# Patient Record
Sex: MALE | Race: WHITE | NOT HISPANIC OR LATINO | Employment: FULL TIME | URBAN - METROPOLITAN AREA
[De-identification: names, ages, dates, MRNs, and addresses within clinical notes are randomized per-mention and may not be internally consistent; named-entity substitution may affect disease eponyms.]

---

## 2017-02-03 ENCOUNTER — LAB VISIT (OUTPATIENT)
Dept: LAB | Facility: HOSPITAL | Age: 27
End: 2017-02-03
Attending: INTERNAL MEDICINE
Payer: COMMERCIAL

## 2017-02-03 ENCOUNTER — OFFICE VISIT (OUTPATIENT)
Dept: INTERNAL MEDICINE | Facility: CLINIC | Age: 27
End: 2017-02-03
Payer: COMMERCIAL

## 2017-02-03 VITALS — DIASTOLIC BLOOD PRESSURE: 71 MMHG | HEART RATE: 69 BPM | OXYGEN SATURATION: 99 % | SYSTOLIC BLOOD PRESSURE: 123 MMHG

## 2017-02-03 DIAGNOSIS — K92.1 HEMATOCHEZIA: Primary | ICD-10-CM

## 2017-02-03 DIAGNOSIS — R10.84 GENERALIZED ABDOMINAL PAIN: ICD-10-CM

## 2017-02-03 DIAGNOSIS — K92.1 HEMATOCHEZIA: ICD-10-CM

## 2017-02-03 LAB
ALBUMIN SERPL BCP-MCNC: 4.2 G/DL
ALP SERPL-CCNC: 71 U/L
ALT SERPL W/O P-5'-P-CCNC: 12 U/L
ANION GAP SERPL CALC-SCNC: 8 MMOL/L
AST SERPL-CCNC: 16 U/L
BASOPHILS # BLD AUTO: 0.02 K/UL
BASOPHILS NFR BLD: 0.3 %
BILIRUB SERPL-MCNC: 0.6 MG/DL
BUN SERPL-MCNC: 15 MG/DL
CALCIUM SERPL-MCNC: 9.2 MG/DL
CHLORIDE SERPL-SCNC: 103 MMOL/L
CO2 SERPL-SCNC: 30 MMOL/L
CREAT SERPL-MCNC: 0.9 MG/DL
DIFFERENTIAL METHOD: ABNORMAL
EOSINOPHIL # BLD AUTO: 0.1 K/UL
EOSINOPHIL NFR BLD: 1.4 %
ERYTHROCYTE [DISTWIDTH] IN BLOOD BY AUTOMATED COUNT: 12.1 %
EST. GFR  (AFRICAN AMERICAN): >60 ML/MIN/1.73 M^2
EST. GFR  (NON AFRICAN AMERICAN): >60 ML/MIN/1.73 M^2
GLUCOSE SERPL-MCNC: 91 MG/DL
HCT VFR BLD AUTO: 40.9 %
HGB BLD-MCNC: 13.9 G/DL
LYMPHOCYTES # BLD AUTO: 2.1 K/UL
LYMPHOCYTES NFR BLD: 36.5 %
MCH RBC QN AUTO: 30.4 PG
MCHC RBC AUTO-ENTMCNC: 34 %
MCV RBC AUTO: 90 FL
MONOCYTES # BLD AUTO: 0.4 K/UL
MONOCYTES NFR BLD: 6.4 %
NEUTROPHILS # BLD AUTO: 3.2 K/UL
NEUTROPHILS NFR BLD: 55.2 %
PLATELET # BLD AUTO: 222 K/UL
PMV BLD AUTO: 10 FL
POTASSIUM SERPL-SCNC: 4.1 MMOL/L
PROT SERPL-MCNC: 6.7 G/DL
RBC # BLD AUTO: 4.57 M/UL
SODIUM SERPL-SCNC: 141 MMOL/L
WBC # BLD AUTO: 5.75 K/UL

## 2017-02-03 PROCEDURE — 99214 OFFICE O/P EST MOD 30 MIN: CPT | Mod: S$GLB,,, | Performed by: INTERNAL MEDICINE

## 2017-02-03 PROCEDURE — 80053 COMPREHEN METABOLIC PANEL: CPT

## 2017-02-03 PROCEDURE — 36415 COLL VENOUS BLD VENIPUNCTURE: CPT

## 2017-02-03 PROCEDURE — 99999 PR PBB SHADOW E&M-EST. PATIENT-LVL III: CPT | Mod: PBBFAC,,, | Performed by: INTERNAL MEDICINE

## 2017-02-03 PROCEDURE — 85025 COMPLETE CBC W/AUTO DIFF WBC: CPT

## 2017-02-03 RX ORDER — IBUPROFEN 200 MG
200 TABLET ORAL EVERY 6 HOURS PRN
COMMUNITY
End: 2017-02-07

## 2017-02-03 NOTE — MR AVS SNAPSHOT
[facility]  [address] [place] [address] [address]  [place]y [place] [address]  Phone: [phone]  Fax: [phone]                  Adi Coleman III   2/3/2017 11:40 AM   Office Visit    Description:  Male : 1990   Provider:  Mary Hooper MD   Department:  [facility]           Reason for Visit     Abdominal Pain     Rectal Bleeding           Diagnoses this Visit        Comments    Hematochezia    -  Primary     Generalized abdominal pain                To Do List           Future Appointments        Provider Department Dept Phone    2/3/2017 12:30 PM APPOINTMENT LAB, [facility] [phone]    2017 9:40 AM Leona Crandall [facility] [phone]      Goals (5 Years of Data)     None      Follow-Up and Disposition     Return in about 4 weeks (around 3/3/2017).      [facility]     [facility] Nurse Care Line -  Assistance  Registered nurses in the [facility] provide clinical advisement, health education, appointment booking, and other advisory services.  Call for this free service at [phone].             Medications           Message regarding Medications     Verify the changes and/or additions to your medication regime listed below are the same as discussed with your clinician today.  If any of these changes or additions are incorrect, please notify your healthcare provider.             Verify that the below list of medications is an accurate representation of the medications you are currently taking.  If none reported, the list may be blank. If incorrect, please contact your healthcare provider. Carry this list with you in case of emergency.           Current Medications     ibuprofen (ADVIL,MOTRIN) 200 MG tablet Take 200 mg by mouth every 6 (six) hours as needed for Pain.    alprazolam (XANAX) 0.5 MG tablet Take 1 tablet (0.5 mg total) by mouth 3 (three) times daily as needed for Anxiety.            Clinical Reference Information           Your Vitals Were     BP Pulse SpO2             123/71 69 99%         Blood Pressure          Most Recent Value    BP  123/71      Allergies as of 2/3/2017     No Known Allergies      Immunizations Administered on Date of Encounter - 2/3/2017     None      Orders Placed During Today's Visit      Normal Orders This Visit    Ambulatory consult to Gastroenterology     Case request GI: COLONOSCOPY     Future Labs/Procedures Expected by Expires    CBC auto differential  2/3/2017 4/4/2018    Comprehensive metabolic panel  2/3/2017 4/4/2018      [facility] Sign-Up     Activating your [facility] account is as easy as 1-2-3!     1) Visit [email or web address], select Sign Up Now, enter this activation code and your date of birth, then select Next.  [email or web address]  Expires: 3/20/2017 12:24 PM      2) Create a username and password to use when you visit [facility] in the future and select a security question in case you lose your password and select Next.    3) Enter your e-mail address and click Sign Up!    Additional Information  If you have questions, please e-mail [email or web address] or call [phone] to talk to our [facility] staff. Remember, [facility] is NOT to be used for urgent needs. For medical emergencies, dial 911.         Language Assistance Services     ATTENTION: Language assistance services are available, free of charge. Please call [phone].      ATENCIÓN: Si habla español, tiene a morillo disposición servicios gratuitos de asistencia lingüística. Llame al [phone].     CHÚ Ý: N?u b?n nói Ti?ng Vi?t, có các d?ch v? h? tr? ngôn ng? mi?n phí dành cho b?n. G?i s? [phone].         [facility] complies with applicable Federal civil rights laws and does not discriminate on the basis of race, color, national origin, age, disability, or sex.

## 2017-02-03 NOTE — PROGRESS NOTES
Subjective:    Portions of this note are generated with voice recognition software. Typographical errors may exist.   Patient ID: Adi Coleman III is a 26 y.o. male.    Chief Complaint: Abdominal Pain (lower abdomen) and Rectal Bleeding (x7 days)    HPI: Bright blood per rectum x 1 week. About a teaspoon ful, mostly on toilet paper, but also noticed in bowl.  Assoc. wih dissuse abdominal pain , crampy, prior to having the BM.  No hematemesis. No oral ulcers/ joint pain/rash  No sign fam history    Review of patient's allergies indicates:  No Known Allergies  Past Medical History   Diagnosis Date    Asthma     Depression      Past Surgical History   Procedure Laterality Date    Tonsillectomy       No family history on file.  Social History     Social History    Marital status: Single     Spouse name: N/A    Number of children: N/A    Years of education: N/A     Occupational History    Not on file.     Social History Main Topics    Smoking status: Former Smoker    Smokeless tobacco: Not on file    Alcohol use No    Drug use: No    Sexual activity: Not on file     Other Topics Concern    Not on file     Social History Narrative     ROS:  GENERAL:   SKIN:   HEAD: No headaches.  EYES: No Blurriing of vision  EARS: No ear pain or changes in hearing.  NOSE: No congestion or rhinorrhea.  MOUTH & THROAT: No hoarseness, change in voice, or sore throat.  NODES: Denies swollen glands.  CHEST: Does chronically get some slight shortness of breath with going up her stairs. No chest pain. No acute worsening recently.  CARDIOVASCULAR: Denies chest pain, PND, orthopnea.  ABDOMEN: No nausea, vomiting, or changes in bowel function.  URINARY: No flank pain, dysuria or hematuria.  PERIPHERAL VASCULAR: No claudication or cyanosis.  MUSCULOSKELETAL: No joint stiffness or swelling. Denies back pain.  NEUROLOGIC: No weakness or numbness.    Physical Exam       Vital signs reviewed, stable BP  PE:   APPEARANCE: Well nourished,  well developed, in no acute distress.   HEAD: Normocephalic, atraumatic.  EYES: PERRL. EOMI. Conjunctivae noninjected.  EARS: TM's intact. Light reflex normal. No retraction or perforation  NOSE: Mucosa pink. Airway clear.  MOUTH & THROAT: No tonsillar enlargement. No pharyngeal erythema or exudate.   NECK: Supple with no cervical lymphadenopathy. No carotid bruits. No thyromegaly  CHEST: Good inspiratory effort. Lungs clear to auscultation with no wheezes or crackles.  CARDIOVASCULAR: Normal S1, S2. No rubs, murmurs, or gallops.  ABDOMEN: Bowel sounds normal. Not distended. Soft. No tenderness or masses. No organomegaly.  EXTREMITIES: No edema, cyanosis, or clubbing.     Assessment:     Labs:  No results found for this or any previous visit (from the past 1008 hour(s)).  1. Hematochezia    2. Generalized abdominal pain        Plan:   Refer to GI evaluation  CBc  Informed him S/S when to go to ER   Orders Placed This Encounter   Procedures    CBC auto differential    Comprehensive metabolic panel    Ambulatory consult to Gastroenterology

## 2017-02-05 ENCOUNTER — HOSPITAL ENCOUNTER (EMERGENCY)
Facility: HOSPITAL | Age: 27
Discharge: HOME | End: 2017-02-05
Attending: EMERGENCY MEDICINE
Payer: COMMERCIAL

## 2017-02-05 VITALS
OXYGEN SATURATION: 98 % | BODY MASS INDEX: 21 KG/M2 | RESPIRATION RATE: 16 BRPM | DIASTOLIC BLOOD PRESSURE: 58 MMHG | HEIGHT: 71 IN | WEIGHT: 150 LBS | TEMPERATURE: 98 F | SYSTOLIC BLOOD PRESSURE: 122 MMHG | HEART RATE: 70 BPM

## 2017-02-05 DIAGNOSIS — K62.5 RECTAL BLEEDING: Primary | ICD-10-CM

## 2017-02-05 LAB — OB PNL STL: NEGATIVE

## 2017-02-05 PROCEDURE — 99283 EMERGENCY DEPT VISIT LOW MDM: CPT

## 2017-02-05 PROCEDURE — 82272 OCCULT BLD FECES 1-3 TESTS: CPT

## 2017-02-05 RX ORDER — HYDROCORTISONE 25 MG/G
CREAM TOPICAL 2 TIMES DAILY
Qty: 20 G | Refills: 0 | Status: SHIPPED | OUTPATIENT
Start: 2017-02-05 | End: 2017-02-05 | Stop reason: ALTCHOICE

## 2017-02-05 NOTE — DISCHARGE INSTRUCTIONS
Understanding Rectal Bleeding    Rectal bleeding is when blood passes through your rectum and anus. It can happen with or without a bowel movement. Rectal bleeding may be a sign of a serious problem in your rectum, colon, or upper GI tract. Call your healthcare provider right away if you have any rectal bleeding.  The GI Tract  The gastrointestinal (GI) tract includes the mouth, esophagus, stomach, small intestine, large intestine (colon), rectum, and anus. The food you eat is digested as it passes through the GI tract. Solid waste leaves the body through the rectum.   Rectal bleeding and GI problems  The cause of rectal bleeding may be found in any region of the GI tract. The colon or rectum may be the site of your bleeding problem. Or, bleeding may be due to problems farther up the GI tract, such as in the small intestine, duodenum, or stomach.  Causes of rectal bleeding  Rectal bleeding causes include the following:  · Hemorrhoids (swollen veins in the rectum and anus)  · Fissures (tears in or near the anus)  · Diverticulosis (inflamed pockets in the colon wall)  · Infection  · Ischemia (low blood flow)  · Radiation damage  · Inflammatory bowel disease (Crohn's disease or ulcerative colitis)  · Ulcers in the upper GI tract and inflammation of the large intestine  · Abnormal tissue growths (tumors or polyps) in the GI tract  · A bulging rectum (also called a rectal prolapse)  · Abnormal blood vessels in the small intestine or in the colon  Common symptoms  Common symptoms include the following:  · Rectal pain, itching, or soreness  · Belly pain or epigastric pain  · Minor occasional drops of blood that appear on the stool or toilet paper, to greater amounts of stool that appear black or tarry   Rectal bleeding can also happen without pain.  Date Last Reviewed: 7/1/2016  © 9624-2579 [employer]. [address] [place] [address], [place] [address]. All rights reserved. This information is not intended as a  substitute for professional medical care. Always follow your healthcare professional's instructions.

## 2017-02-05 NOTE — ED NOTES
Pt reports that he has noticed bright red blood on toilet tissue when he wipes after BM's. Also, c/o pain to rectal area. Was seen in clinic and scheduled for a procedure to be done on Tuesday, but states he was concerned that bleeding has increased. Denies hx hemorrhoids. Recent constipation issues.

## 2017-02-05 NOTE — ED PROVIDER NOTES
Encounter Date: 2/5/2017       History     Chief Complaint   Patient presents with    Rectal Pain     pain with bright red blood noted on toilet tissue x 1 week.  Amount of blood and pain has increased.       Review of patient's allergies indicates:  No Known Allergies  HPI Comments:  Adi Coleman III 26 y.o. nontoxic/afebrile male with PMH of back pain presented to the ED with C/O rectal bleeding and pain for the past one week. She reports that he has noted BRB with wiping and a scant amount of stool on few episodes. He was seen by urgent care and PCP for these symptoms with CBC and CMP drawn this week with no significant findings. He was placed on suppository for the pain and symptoms with scant use reported by patient. He states that he was told of possible internal hemorrhoid as none seen externally and has an ambulatory referral to see GI. He reports some intermittent abdominal pain that he presently denies. He states that his stools have been firm as he was taking narcotic pain medication for back pain. He denies any fever, chills, N/V/D, hematuria, or dysuria.     The history is provided by the patient.     Past Medical History   Diagnosis Date    Asthma     Depression      No past medical history pertinent negatives.  Past Surgical History   Procedure Laterality Date    Tonsillectomy       History reviewed. No pertinent family history.  Social History   Substance Use Topics    Smoking status: Former Smoker    Smokeless tobacco: None    Alcohol use No     Review of Systems   Constitutional: Negative for activity change, appetite change, chills and fever.   HENT: Negative for congestion and sore throat.    Respiratory: Negative for cough and shortness of breath.    Cardiovascular: Negative for chest pain.   Gastrointestinal: Positive for abdominal pain, blood in stool and rectal pain. Negative for constipation, diarrhea, nausea and vomiting.        Intermittent LLQ abdominal pain; presently denies    Genitourinary: Negative for dysuria, hematuria and testicular pain.   Musculoskeletal: Negative for back pain.   Skin: Negative for pallor and rash.   Neurological: Negative for dizziness, weakness, light-headedness and numbness.   Hematological: Does not bruise/bleed easily.       Physical Exam   Initial Vitals   BP Pulse Resp Temp SpO2   02/05/17 1428 02/05/17 1428 02/05/17 1428 02/05/17 1428 02/05/17 1428   122/58 70 16 98.3 °F (36.8 °C) 98 %     Physical Exam    Nursing note and vitals reviewed.  Constitutional: Vital signs are normal. He appears well-developed and well-nourished. He is cooperative.  Non-toxic appearance. He does not appear ill.   HENT:   Head: Normocephalic and atraumatic.   Eyes: Conjunctivae and lids are normal.   Neck: Trachea normal and normal range of motion. Neck supple. No stridor present. No tracheal deviation present.   Cardiovascular: Normal rate.   Pulmonary/Chest: No respiratory distress.   Abdominal: Soft. Normal appearance. There is no tenderness. There is no rebound and no guarding.   Genitourinary: Rectum normal. Rectal exam shows no external hemorrhoid, no internal hemorrhoid, no fissure, no tenderness and guaiac negative stool. Guaiac negative stool.   Musculoskeletal: Normal range of motion.   Neurological: He is alert and oriented to person, place, and time. He has normal strength. GCS eye subscore is 4. GCS verbal subscore is 5. GCS motor subscore is 6.   Skin: Skin is warm, dry and intact. No rash noted.   Psychiatric: He has a normal mood and affect. His speech is normal and behavior is normal. Thought content normal.         ED Course   Procedures  Labs Reviewed   OCCULT BLOOD X 1, STOOL       Adi HELMS Coleman III 26 y.o. nontoxic/afebrile male with PMH of back pain presented to the ED with C/O rectal bleeding and pain for the past one week. She reports that he has noted BRB with wiping and a scant amount of stool on few episodes. He was seen by urgent care and PCP for  these symptoms with CBC and CMP drawn this week with no significant findings. He was placed on suppository for the pain and symptoms with scant use reported by patient. He states that he was told of possible internal hemorrhoid as none seen externally and has an ambulatory referral to see GI. He reports some intermittent abdominal pain that he presently denies. He states that his stools have been firm as he was taking narcotic pain medication for back pain. He denies any fever, chills, N/V/D, hematuria, or dysuria. ROS positive for rectal bleeding.  Physical exam reveals patient well appearing in no obvious distress. Mucous membranes most and free of pallor. Lungs clear, heart regular rate and rhythm.  Abdomen is soft and nontender with normal bowel sounds. Rectal with no bleeding, hemorrhoid, or fissure. No internal hemorrhoid noted on exam, good tone and no blood noted on occult. FROM of all extremities with strength 5/5 bilaterally. skin free of pallor.     DDX: rectal bleeding, hemorrhoid internal vs, external, fissure, diverticulitis    ED management: occult negative. Reviewed stable CBC on 2/2/17. Patient appears stale today with low suspicion of infectious process at this time. Instructed to increase water intake, refrain from opioids and use proctofoam as symptoms likely due to internal hemorrhoids as occult blood is negative. Encouraged to keep GI follow up Tuesday.     Impression/Plan:The encounter diagnosis was Rectal bleeding.  Patient will follow up with Primary.  Patient cautioned on when to return to ED.  Pt. Understands and agrees with current treatment plan                             ED Course     Clinical Impression:   The encounter diagnosis was Rectal bleeding.          OTONIEL Blackmon  02/05/17 [id number]

## 2017-02-05 NOTE — ED AVS SNAPSHOT
[facility] [address]  [place] [address]               Adi Coleman III   2017  2:55 PM   ED    Description:  Male : 1990   Department:  [facility]           Your Care was Coordinated By:     Provider Role From To    Ashok Chavez Jr., MD Attending Provider [phone] --    OTONIEL Blackmon Physician Assistant 17 [phone] --      Reason for Visit     Rectal Pain           Diagnoses this Visit        Comments    Rectal bleeding    -  Primary       ED Disposition     None           To Do List           Follow-up Information     Follow up with GI .    Why:  as scheduled       These Medications        Disp Refills Start End    hydrocortisone 2.5 % cream 20 g 0 2017 2/15/2017    Apply topically 2 (two) times daily. - Topical (Top)    Pharmacy: [facility] [address] [place] [address] SOFI PENA [address] [facility] #: [phone]         [facility]     [facility] Care [facility] -  Assistance  Registered nurses in the [facility] provide clinical advisement, health education, appointment booking, and other advisory services.  Call for this free service at [phone].             Medications           Message regarding Medications     Verify the changes and/or additions to your medication regime listed below are the same as discussed with your clinician today.  If any of these changes or additions are incorrect, please notify your healthcare provider.        START taking these NEW medications        Refills    hydrocortisone 2.5 % cream 0    Sig: Apply topically 2 (two) times daily.    Class: Print    Route: Topical (Top)           Verify that the below list of medications is an accurate representation of the medications you are currently taking.  If none reported, the list may be blank. If incorrect, please contact your healthcare provider. Carry this list with you in  "case of emergency.           Current Medications     alprazolam (XANAX) 0.5 MG tablet Take 1 tablet (0.5 mg total) by mouth 3 (three) times daily as needed for Anxiety.    hydrocortisone 2.5 % cream Apply topically 2 (two) times daily.    ibuprofen (ADVIL,MOTRIN) 200 MG tablet Take 200 mg by mouth every 6 (six) hours as needed for Pain.           Clinical Reference Information           Your Vitals Were     BP Pulse Temp Resp Height Weight    122/58 (BP Location: Left arm, Patient Position: Sitting) 70 98.3 °F (36.8 °C) (Oral) 16 5' 11" (1.803 m) 68 kg (150 lb)    SpO2 BMI             98% 20.92 kg/m2         Allergies as of 2/5/2017     No Known Allergies      Immunizations Administered on Date of Encounter - 2/5/2017     None      ED Micro, Lab, POCT     Start Ordered       Status Ordering Provider    02/05/17 1528 02/05/17 1527  Occult blood x 1, stool  Once      Final result       ED Imaging Orders     None        Discharge Instructions         Understanding Rectal Bleeding    Rectal bleeding is when blood passes through your rectum and anus. It can happen with or without a bowel movement. Rectal bleeding may be a sign of a serious problem in your rectum, colon, or upper GI tract. Call your healthcare provider right away if you have any rectal bleeding.  The GI Tract  The gastrointestinal (GI) tract includes the mouth, esophagus, stomach, small intestine, large intestine (colon), rectum, and anus. The food you eat is digested as it passes through the GI tract. Solid waste leaves the body through the rectum.   Rectal bleeding and GI problems  The cause of rectal bleeding may be found in any region of the GI tract. The colon or rectum may be the site of your bleeding problem. Or, bleeding may be due to problems farther up the GI tract, such as in the small intestine, duodenum, or stomach.  Causes of rectal bleeding  Rectal bleeding causes include the following:  · Hemorrhoids (swollen veins in the rectum and " anus)  · Fissures (tears in or near the anus)  · Diverticulosis (inflamed pockets in the colon wall)  · Infection  · Ischemia (low blood flow)  · Radiation damage  · Inflammatory bowel disease (Crohn's disease or ulcerative colitis)  · Ulcers in the upper GI tract and inflammation of the large intestine  · Abnormal tissue growths (tumors or polyps) in the GI tract  · A bulging rectum (also called a rectal prolapse)  · Abnormal blood vessels in the small intestine or in the colon  Common symptoms  Common symptoms include the following:  · Rectal pain, itching, or soreness  · Belly pain or epigastric pain  · Minor occasional drops of blood that appear on the stool or toilet paper, to greater amounts of stool that appear black or tarry   Rectal bleeding can also happen without pain.  Date Last Reviewed: 7/1/2016  © 2847-9386 [employer]. [address] [place] [address]. All rights reserved. This information is not intended as a substitute for professional medical care. Always follow your healthcare professional's instructions.          Your Scheduled Appointments     Feb 07, 2017  9:40 AM CST   Consult with CEASAR Fernandez - Gastroenterology ([place])    [address] [facility]  [address]  [facility] [id number] [phone]              [facility] Sign-Up     Activating your [facility] account is as easy as 1-2-3!     1) Visit [email or web address], select Sign Up Now, enter this activation code and your date of birth, then select Next.  [email or web address]  Expires: 3/20/2017 12:24 PM      2) Create a username and password to use when you visit [facility] in the future and select a security question in case you lose your password and select Next.    3) Enter your e-mail address and click Sign Up!    Additional Information  If you have questions, please e-mail [email or web address] or call [phone] to talk to our [facility] staff. Remember, [facility] is NOT to be used for urgent  needs. For medical emergencies, dial 911.         Smoking Cessation     If you would like to quit smoking:   You may be eligible for free services if you are a [place] resident and started smoking cigarettes before September 1, 1988.  Call the Smoking Cessation Trust (SCT) toll free at (851) 211-1638 or (350) 127-5095.   Call 1-800-QUIT-NOW if you do not meet the above criteria.             [facility] complies with applicable Federal civil rights laws and does not discriminate on the basis of race, color, national origin, age, disability, or sex.        Language Assistance Services     ATTENTION: Language assistance services are available, free of charge. Please call [phone].      ATENCIÓN: Si habla betty, tiene a morillo disposición servicios gratuitos de asistencia lingüística. Llame al [phone].     CHÚ Ý: N?u b?n nói Ti?ng Vi?t, có các d?ch v? h? tr? ngôn ng? mi?n phí dành cho b?n. G?i s? [phone].

## 2017-02-06 PROBLEM — K92.1 HEMATOCHEZIA: Status: ACTIVE | Noted: 2017-02-06

## 2017-02-07 ENCOUNTER — INITIAL CONSULT (OUTPATIENT)
Dept: GASTROENTEROLOGY | Facility: CLINIC | Age: 27
End: 2017-02-07
Payer: COMMERCIAL

## 2017-02-07 VITALS
DIASTOLIC BLOOD PRESSURE: 68 MMHG | BODY MASS INDEX: 20.96 KG/M2 | HEIGHT: 71 IN | SYSTOLIC BLOOD PRESSURE: 116 MMHG | HEART RATE: 81 BPM | WEIGHT: 149.69 LBS

## 2017-02-07 DIAGNOSIS — K62.5 RECTAL BLEEDING: ICD-10-CM

## 2017-02-07 DIAGNOSIS — K59.00 CONSTIPATION, UNSPECIFIED CONSTIPATION TYPE: Primary | ICD-10-CM

## 2017-02-07 PROCEDURE — 99999 PR PBB SHADOW E&M-EST. PATIENT-LVL III: CPT | Mod: PBBFAC,,, | Performed by: NURSE PRACTITIONER

## 2017-02-07 PROCEDURE — 99203 OFFICE O/P NEW LOW 30 MIN: CPT | Mod: S$GLB,,, | Performed by: NURSE PRACTITIONER

## 2017-02-07 NOTE — LETTER
February 7, 2017      Mary Hooper MD  [address]  [address]  [place] [address]           [facility]  [address]  [address]  [place] [address]  Phone: [phone]          Patient: Adi Coleman III   MR Number: [id number]   YOB: 1990   Date of Visit: 2/7/2017       Dear Dr. Mary Hooper:    Thank you for referring Adi Coleman to me for evaluation. Attached you will find relevant portions of my assessment and plan of care.    If you have questions, please do not hesitate to call me. I look forward to following Adi Coleman along with you.    Sincerely,    Leona Crandall, ANTONI    Enclosure  CC:  No Recipients    If you would like to receive this communication electronically, please contact [email or web address] or [phone] to request more information on [facility] access.    For providers and/or their staff who would like to refer a patient to [facility], please contact us through our one-stop-shop provider referral line, [facility], at [phone].    If you feel you have received this communication in error or would no longer like to receive these types of communications, please e-mail [email or web address]

## 2017-02-07 NOTE — PROGRESS NOTES
Subjective:       Patient ID: Adi Coleman III is a 26 y.o. male.    Chief Complaint: Rectal Bleeding and Abdominal Pain    HPI  For one week has noted blood with bowel movements mostly on the toilet paper and scant amounts on the stool.  He shows me a picture today of bright red blood in small amounts on toilet paper.  He does report hard stools and straining.  He denies anal rectal pain.  Last week he felt the bleeding was worse and presented to the ED.  Hemoccult negative, negative rectal exam and CBC stable.  He was prescribed proctofoam but has not yet used this.  He denies abdominal pain associated with his current complaints but reports episodic lower abdominal pain.  Denies nausea, vomiting or black stools.  Denies family history of colon cancer, colon polyps, IBD.    Review of Systems   Constitutional: Negative.  Negative for activity change, appetite change, fatigue, fever and unexpected weight change.   Respiratory: Negative.  Negative for cough, choking and shortness of breath.    Cardiovascular: Negative.  Negative for chest pain.   Gastrointestinal: Positive for anal bleeding and constipation. Negative for abdominal distention, abdominal pain, diarrhea, nausea, rectal pain and vomiting.   Genitourinary: Negative.  Negative for difficulty urinating, dysuria and hematuria.   Musculoskeletal: Negative.  Negative for neck pain and neck stiffness.   Skin: Negative.    Neurological: Negative.  Negative for dizziness, syncope, weakness and light-headedness.   Psychiatric/Behavioral: Negative.        Objective:      Physical Exam   Constitutional: He is oriented to person, place, and time. He appears well-developed and well-nourished. No distress.   HENT:   Head: Normocephalic.   Eyes: No scleral icterus.   Cardiovascular: Normal rate.    Pulmonary/Chest: Effort normal. No respiratory distress.   Abdominal: Normal appearance.   Neurological: He is alert and oriented to person, place, and time.   Skin: Skin is  warm and dry. He is not diaphoretic. No pallor.   Psychiatric: He has a normal mood and affect. His behavior is normal. Judgment and thought content normal.   Vitals reviewed.      Assessment:       1. Constipation, unspecified constipation type    2. Rectal bleeding        Plan:         Adi was seen today for rectal bleeding and abdominal pain.    Diagnoses and all orders for this visit:    Constipation, unspecified constipation type    Rectal bleeding       -   High fiber diet  -   Fiber supplement  -   [place] fluid intake   -   Exercise  -  Stool softener    Proctofoam as prescribed.  Sitz baths.     If bleeding continues after treatment will schedule colonoscopy.    He will follow up in one month or sooner if needed.

## 2017-02-15 ENCOUNTER — OFFICE VISIT (OUTPATIENT)
Dept: GASTROENTEROLOGY | Facility: CLINIC | Age: 27
End: 2017-02-15
Payer: COMMERCIAL

## 2017-02-15 VITALS
HEIGHT: 71 IN | HEART RATE: 78 BPM | DIASTOLIC BLOOD PRESSURE: 73 MMHG | WEIGHT: 152.13 LBS | BODY MASS INDEX: 21.3 KG/M2 | SYSTOLIC BLOOD PRESSURE: 123 MMHG

## 2017-02-15 DIAGNOSIS — K92.1 HEMATOCHEZIA: Primary | ICD-10-CM

## 2017-02-15 PROCEDURE — 99213 OFFICE O/P EST LOW 20 MIN: CPT | Mod: S$GLB,,, | Performed by: NURSE PRACTITIONER

## 2017-02-15 PROCEDURE — 99999 PR PBB SHADOW E&M-EST. PATIENT-LVL III: CPT | Mod: PBBFAC,,, | Performed by: NURSE PRACTITIONER

## 2017-02-15 NOTE — PROGRESS NOTES
Subjective:       Patient ID: Adi Coleman III is a 26 y.o. male.    Chief Complaint: Rectal Bleeding and Follow-up    HPI  Reports continued complaints of rectal bleeding occurring with every bowel movement.  Reports this has been bright red and has noted 2 days this of week of dark red blood and mucous.   He reports hard stools with straining at times.  Other times, he may have 6-10 soft bowel movements daily.  He sees blood regardless of bowel movement.  He has used hemorrhoidal suppositories with no improvement.  He has added fiber supplement as we discussed at his last visit with no changes. He did not use the proctofoam.  Denies abdominal pain, nausea, vomiting or black stools.     Review of Systems   Constitutional: Negative.  Negative for activity change, appetite change, fatigue, fever and unexpected weight change.   HENT: Negative.  Negative for sore throat and trouble swallowing.    Respiratory: Negative.  Negative for cough, choking and shortness of breath.    Cardiovascular: Negative.  Negative for chest pain.   Gastrointestinal: Positive for blood in stool and constipation. Negative for abdominal distention, abdominal pain, diarrhea, nausea, rectal pain and vomiting.   Genitourinary: Negative.  Negative for difficulty urinating, dysuria and hematuria.   Musculoskeletal: Negative.  Negative for neck pain and neck stiffness.   Skin: Negative.    Neurological: Negative.  Negative for dizziness, syncope, weakness and light-headedness.   Psychiatric/Behavioral: Negative.        Objective:      Physical Exam   Constitutional: He is oriented to person, place, and time. He appears well-developed and well-nourished. No distress.   HENT:   Head: Normocephalic.   Eyes: No scleral icterus.   Cardiovascular: Normal rate.    Pulmonary/Chest: Effort normal. No respiratory distress.   Musculoskeletal: Normal range of motion.   Neurological: He is alert and oriented to person, place, and time.   Skin: Skin is warm and  dry. He is not diaphoretic. No pallor.   Psychiatric: He has a normal mood and affect. His behavior is normal. Judgment and thought content normal.   Vitals reviewed.      Assessment:       1. Hematochezia        Plan:         Adi was seen today for rectal bleeding and follow-up.    Diagnoses and all orders for this visit:    Hematochezia    Other orders  -     Case request GI: COLONOSCOPY       I have explained the planned procedures to the patient.The risks, benefits and alternatives of the procedure were also explained in detail. Patient verbalized understanding, all questions were answered. The patient agrees to proceed as planned    Continue constipation treatment as discussed at last visit.    Begin proctofoam.    Colonoscopy for persistent rectal bleeding.

## 2017-03-07 NOTE — H&P
For greater than one week patient noted blood with bowel movements mostly on the toilet paper and scant amounts on the stool.    He does report hard stools and straining.  He denies anal rectal pain.   presented to the ED. Hemoccult negative, negative rectal exam and CBC stable.    He denies abdominal pain associated with his current complaints but reports episodic lower abdominal pain.  Denies nausea, vomiting or black stools.  Denies family history of colon cancer, colon polyps, IBD.     Review of Systems   Constitutional: Negative. Negative for activity change, appetite change, fatigue, fever and unexpected weight change.   Respiratory: Negative. Negative for cough, choking and shortness of breath.   Cardiovascular: Negative. Negative for chest pain.   Gastrointestinal: Positive for anal bleeding and constipation. Negative for abdominal distention, abdominal pain, diarrhea, nausea, rectal pain and vomiting.   Genitourinary: Negative. Negative for difficulty urinating, dysuria and hematuria.   Musculoskeletal: Negative. Negative for neck pain and neck stiffness.   Skin: Negative.   Neurological: Negative. Negative for dizziness, syncope, weakness and light-headedness.   Psychiatric/Behavioral: Negative.       Objective:      Physical Exam   Constitutional: He is oriented to person, place, and time. He appears well-developed and well-nourished. No distress.   HENT:   Head: Normocephalic.   Eyes: No scleral icterus.   Cardiovascular: Normal rate.   Pulmonary/Chest: Effort normal. No respiratory distress.   Abdominal: Normal appearance.   Neurological: He is alert and oriented to person, place, and time.   Skin: Skin is warm and dry. He is not diaphoretic. No pallor.   Psychiatric: He has a normal mood and affect. His behavior is normal. Judgment and thought content normal.   Vitals reviewed.      Assessment:       1. Constipation, unspecified constipation type    2. Rectal bleeding        Plan:         Adi fabi  seen for rectal bleeding and abdominal pain.     Diagnoses and all orders for this visit:     Constipation, unspecified constipation type     Rectal bleeding        - High fiber diet  - Fiber supplement  - [place] fluid intake   - Exercise  - Stool softener      colonoscopy.

## 2017-03-08 ENCOUNTER — SURGERY (OUTPATIENT)
Age: 27
End: 2017-03-08

## 2017-03-08 ENCOUNTER — ANESTHESIA EVENT (OUTPATIENT)
Dept: ENDOSCOPY | Facility: HOSPITAL | Age: 27
End: 2017-03-08
Payer: COMMERCIAL

## 2017-03-08 ENCOUNTER — HOSPITAL ENCOUNTER (OUTPATIENT)
Facility: HOSPITAL | Age: 27
Discharge: HOME | End: 2017-03-08
Attending: INTERNAL MEDICINE | Admitting: INTERNAL MEDICINE
Payer: COMMERCIAL

## 2017-03-08 ENCOUNTER — ANESTHESIA (OUTPATIENT)
Dept: ENDOSCOPY | Facility: HOSPITAL | Age: 27
End: 2017-03-08
Payer: COMMERCIAL

## 2017-03-08 VITALS
HEIGHT: 71 IN | WEIGHT: 150 LBS | RESPIRATION RATE: 20 BRPM | DIASTOLIC BLOOD PRESSURE: 39 MMHG | BODY MASS INDEX: 21 KG/M2 | OXYGEN SATURATION: 98 % | HEART RATE: 68 BPM | TEMPERATURE: 99 F | SYSTOLIC BLOOD PRESSURE: 106 MMHG

## 2017-03-08 DIAGNOSIS — Z12.11 SCREENING FOR COLORECTAL CANCER: ICD-10-CM

## 2017-03-08 DIAGNOSIS — Z12.12 SCREENING FOR COLORECTAL CANCER: ICD-10-CM

## 2017-03-08 DIAGNOSIS — K62.89 PROCTITIS: Primary | ICD-10-CM

## 2017-03-08 DIAGNOSIS — K62.5 RECTAL BLEEDING: Primary | ICD-10-CM

## 2017-03-08 PROCEDURE — 25000003 PHARM REV CODE 250: Performed by: INTERNAL MEDICINE

## 2017-03-08 PROCEDURE — 37000008 HC ANESTHESIA 1ST 15 MINUTES: Performed by: INTERNAL MEDICINE

## 2017-03-08 PROCEDURE — 37000009 HC ANESTHESIA EA ADD 15 MINS: Performed by: INTERNAL MEDICINE

## 2017-03-08 PROCEDURE — 88305 TISSUE EXAM BY PATHOLOGIST: CPT | Mod: 26,,, | Performed by: PATHOLOGY

## 2017-03-08 PROCEDURE — 63600175 PHARM REV CODE 636 W HCPCS: Performed by: NURSE ANESTHETIST, CERTIFIED REGISTERED

## 2017-03-08 PROCEDURE — 27201012 HC FORCEPS, HOT/COLD, DISP: Performed by: INTERNAL MEDICINE

## 2017-03-08 PROCEDURE — 45380 COLONOSCOPY AND BIOPSY: CPT | Mod: ,,, | Performed by: INTERNAL MEDICINE

## 2017-03-08 PROCEDURE — 45380 COLONOSCOPY AND BIOPSY: CPT | Performed by: INTERNAL MEDICINE

## 2017-03-08 PROCEDURE — 25000003 PHARM REV CODE 250: Performed by: NURSE ANESTHETIST, CERTIFIED REGISTERED

## 2017-03-08 PROCEDURE — 88305 TISSUE EXAM BY PATHOLOGIST: CPT | Performed by: PATHOLOGY

## 2017-03-08 RX ORDER — PROPOFOL 10 MG/ML
VIAL (ML) INTRAVENOUS CONTINUOUS PRN
Status: DISCONTINUED | OUTPATIENT
Start: 2017-03-08 | End: 2017-03-08

## 2017-03-08 RX ORDER — PROPOFOL 10 MG/ML
VIAL (ML) INTRAVENOUS
Status: DISCONTINUED | OUTPATIENT
Start: 2017-03-08 | End: 2017-03-08

## 2017-03-08 RX ORDER — SODIUM CHLORIDE 9 MG/ML
INJECTION, SOLUTION INTRAVENOUS CONTINUOUS
Status: DISCONTINUED | OUTPATIENT
Start: 2017-03-08 | End: 2017-03-08 | Stop reason: HOSPADM

## 2017-03-08 RX ORDER — MESALAMINE 1000 MG/1
500 SUPPOSITORY RECTAL NIGHTLY
Qty: 30 SUPPOSITORY | Refills: 4 | Status: SHIPPED | OUTPATIENT
Start: 2017-03-08 | End: 2017-11-30

## 2017-03-08 RX ORDER — MIDAZOLAM HYDROCHLORIDE 1 MG/ML
INJECTION, SOLUTION INTRAMUSCULAR; INTRAVENOUS
Status: DISCONTINUED | OUTPATIENT
Start: 2017-03-08 | End: 2017-03-08

## 2017-03-08 RX ORDER — LIDOCAINE HCL/PF 100 MG/5ML
SYRINGE (ML) INTRAVENOUS
Status: DISCONTINUED | OUTPATIENT
Start: 2017-03-08 | End: 2017-03-08

## 2017-03-08 RX ADMIN — LIDOCAINE HYDROCHLORIDE 100 MG: 20 INJECTION, SOLUTION INTRAVENOUS at 09:03

## 2017-03-08 RX ADMIN — PROPOFOL 175 MCG/KG/MIN: 10 INJECTION, EMULSION INTRAVENOUS at 09:03

## 2017-03-08 RX ADMIN — PROPOFOL 70 MG: 10 INJECTION, EMULSION INTRAVENOUS at 09:03

## 2017-03-08 RX ADMIN — MIDAZOLAM 2 MG: 1 INJECTION INTRAMUSCULAR; INTRAVENOUS at 09:03

## 2017-03-08 RX ADMIN — SODIUM CHLORIDE: 0.9 INJECTION, SOLUTION INTRAVENOUS at 09:03

## 2017-03-08 NOTE — IP AVS SNAPSHOT
[facility]  [address]  [place] [address]  Phone: [phone]           Patient Discharge Instructions     Our goal is to set you up for success. This packet includes information on your condition, medications, and your home care. It will help you to care for yourself so you don't get sicker and need to go back to the hospital.     Please ask your nurse if you have any questions.        There are many details to remember when preparing to leave the hospital. Here is what you will need to do:    1. Take your medicine. If you are prescribed medications, review your Medication List in the following pages. You may have new medications to  at the pharmacy and others that you'll need to stop taking. Review the instructions for how and when to take your medications. Talk with your doctor or nurses if you are unsure of what to do.     2. Go to your follow-up appointments. Specific follow-up information is listed in the following pages. Your may be contacted by a transition nurse or clinical provider about future appointments. Be sure we have all of the phone numbers to reach you, if needed. Please contact your provider's office if you are unable to make an appointment.     3. Watch for warning signs. Your doctor or nurse will give you detailed warning signs to watch for and when to call for assistance. These instructions may also include educational information about your condition. If you experience any of warning signs to your health, call your doctor.               ** Verify the list of medication(s) below is accurate and up to date. Carry this with you in case of emergency. If your medications have changed, please notify your healthcare provider.             Medication List      ASK your doctor about these medications        Additional Info                      alprazolam 0.5 MG tablet   Commonly known as:  XANAX   Quantity:  20 tablet   Refills:  0   Dose:  0.5 mg    Instructions:  Take  1 tablet (0.5 mg total) by mouth 3 (three) times daily as needed for Anxiety.     Begin Date    AM    Noon    PM    Bedtime       hydrocortisone-pramoxine rectal foam   Commonly known as:  PROCTOFOAM-HS   Quantity:  10 g   Refills:  0   Dose:  1 applicator    Instructions:  Place 1 applicator rectally 2 (two) times daily.     Begin Date    AM    Noon    PM    Bedtime                  Please bring to all follow up appointments:    1. A copy of your discharge instructions.  2. All medicines you are currently taking in their original bottles.  3. Identification and insurance card.    Please arrive 15 minutes ahead of scheduled appointment time.    Please call 24 hours in advance if you must reschedule your appointment and/or time.            Discharge Instructions       Discharge Summary/Instructions for after Colonoscopy with Biopsy/Polypectomy    Adi Coleman III  3/8/2017  Max Cullen Jr., MD    Restrictions on Activity:    - Do not drive car or operate machinery until the day after the procedure.  - The following day: return to full activity including work.  - For 3 days: No heavy lifting, straining or running.  - Diet: Eat and drink normally unless instructed otherwise.    Treatment for Common Side Effects:  - Mild abdominal pain and bloating or excessive gas: rest, eat lightly and use a heating pad.     Symptoms to watch for and report to your physician:  1. Severe abdominal pain.  2. Fever within 24 hours after a procedure.  3. A large amount of rectal bleeding. (A small amount of blood from the rectum is not serious, especially if hemorrhoids are present.)  4. Because air was put into your colon during the procedure, expelling large amount of air from your rectum is normal.  5. You may not have a bowel movement for 1-3 days because of the colonoscopy prep. This is normal.  6. Go directly to the emergency room if you notice any of the following:     Chills and/or fever over 101   Persistent  "vomiting   Severe abdominal pain, other than gas cramps   Severe chest pain   Black, tarry stools   Any bleeding - exceeding one tablespoon    If you have any questions or problems, please call your Physician:    Max Cullen Jr., MD      Lab Results: Contact Physician's Office      If a complication or emergency situation arises and you are unable to reach your Physician - GO TO THE EMERGENCY ROOM.          Admission Information     Date & Time Provider Department CSN    3/8/2017  8:35 AM Max Cullen Jr., MD [facility] [id number]      Care Providers     Provider Role Specialty Primary office phone    Max Cullen Jr., MD Attending Provider Gastroenterology [phone]    Max Cullen Jr., MD Surgeon  Gastroenterology [phone]      Your Vitals Were     BP Pulse Temp Resp Height Weight    106/39 68 98.6 °F (37 °C) (Oral) 20 5' 11" (1.803 m) 68 kg (150 lb)    SpO2 BMI             98% 20.92 kg/m2         Recent Lab Values     No lab values to display.      Pending Labs     Order Current Status    Specimen to Pathology - Surgery Collected (03/08/17 1002)      Allergies as of 3/8/2017     No Known Allergies      [facility]     [facility] Nurse Care Line - 24/7 Assistance  Unless otherwise directed by your provider, please contact [facility], our nurse care line that is available for 24/7 assistance.     Registered nurses in the [facility] provide clinical advisement, health education, appointment booking, and other advisory services.  Call for this free service at [phone].        Advance Directives     An advance directive is a document which, in the event you are no longer able to make decisions for yourself, tells your healthcare team what kind of treatment you do or do not want to receive, or who you would like to make those decisions for you.  If you do not currently have an advance directive, [facility] encourages you to create one.  For " more information call:  [phone]), [phone] ([phone]),  or log on to [email or web address].        Language Assistance Services     ATTENTION: Language assistance services are available, free of charge. Please call [phone].      ATENCIÓN: Si habla betty, tiene a morillo disposición servicios gratuitos de asistencia lingüística. Llame al [phone].     [facility] Ý: N?u b?n nói Ti?ng Vi?t, có các d?ch v? h? tr? ngôn ng? mi?n phí dành cho b?n. G?i s? [phone].        [facility] Sign-Up     Activating your [facility] account is as easy as 1-2-3!     1) Visit [email or web address], select Sign Up Now, enter this activation code and your date of birth, then select Next.  [email or web address]  Expires: 3/20/2017 12:24 PM      2) Create a username and password to use when you visit [facility] in the future and select a security question in case you lose your password and select Next.    3) Enter your e-mail address and click Sign Up!    Additional Information  If you have questions, please e-mail [email or web address] or call [phone] to talk to our [facility] staff. Remember, [facility] is NOT to be used for urgent needs. For medical emergencies, dial 911.          [facility] complies with applicable Federal civil rights laws and does not discriminate on the basis of race, color, national origin, age, disability, or sex.

## 2017-03-08 NOTE — PLAN OF CARE
Past Medical History:   Diagnosis Date    Asthma     Depression      Proctitis on exam today.  Dr. Cullen visited at bedside, discussed findings and recommendations with patient and father; all questions asked and answered. Verbalized understanding of information give. Handout provided at time of discharge.

## 2017-03-08 NOTE — TRANSFER OF CARE
"Anesthesia Transfer of Care Note    Patient: Adi Coleman III    Procedure(s) Performed: Procedure(s) (LRB):  COLONOSCOPY (N/A)    Patient location: GI    Anesthesia Type: MAC    Transport from OR: Transported from OR on room air with adequate spontaneous ventilation    Post pain: adequate analgesia    Post assessment: no apparent anesthetic complications and tolerated procedure well    Post vital signs: stable    Level of consciousness: awake, alert and oriented    Nausea/Vomiting: no nausea/vomiting    Complications: none          Last vitals:   Visit Vitals    BP (!) 117/56    Pulse 70    Temp 36.9 °C (98.4 °F)    Resp 16    Ht 5' 11" (1.803 m)    Wt 68 kg (150 lb)    SpO2 100%    BMI 20.92 kg/m2     "

## 2017-03-08 NOTE — DISCHARGE INSTRUCTIONS
Discharge Summary/Instructions for after Colonoscopy with Biopsy/Polypectomy    Adi Coleman III  3/8/2017  Max Cullen Jr., MD    Restrictions on Activity:    - Do not drive car or operate machinery until the day after the procedure.  - The following day: return to full activity including work.  - For 3 days: No heavy lifting, straining or running.  - Diet: Eat and drink normally unless instructed otherwise.    Treatment for Common Side Effects:  - Mild abdominal pain and bloating or excessive gas: rest, eat lightly and use a heating pad.     Symptoms to watch for and report to your physician:  1. Severe abdominal pain.  2. Fever within 24 hours after a procedure.  3. A large amount of rectal bleeding. (A small amount of blood from the rectum is not serious, especially if hemorrhoids are present.)  4. Because air was put into your colon during the procedure, expelling large amount of air from your rectum is normal.  5. You may not have a bowel movement for 1-3 days because of the colonoscopy prep. This is normal.  6. Go directly to the emergency room if you notice any of the following:     Chills and/or fever over 101   Persistent vomiting   Severe abdominal pain, other than gas cramps   Severe chest pain   Black, tarry stools   Any bleeding - exceeding one tablespoon    If you have any questions or problems, please call your Physician:    Max Cullen Jr., MD      Lab Results: Contact Physician's Office      If a complication or emergency situation arises and you are unable to reach your Physician - GO TO THE EMERGENCY ROOM.

## 2017-03-08 NOTE — ANESTHESIA POSTPROCEDURE EVALUATION
"Anesthesia Post Evaluation    Patient: Adi Coleman III    Procedure(s) Performed: Procedure(s) (LRB):  COLONOSCOPY (N/A)    Final Anesthesia Type: MAC  Patient location during evaluation: GI PACU  Patient participation: Yes- Able to Participate  Level of consciousness: awake and alert and oriented  Post-procedure vital signs: reviewed and stable  Pain management: adequate  Airway patency: patent  PONV status at discharge: No PONV  Anesthetic complications: no      Cardiovascular status: blood pressure returned to baseline and hemodynamically stable  Respiratory status: unassisted  Hydration status: euvolemic  Follow-up not needed.        Visit Vitals    BP (!) 117/56    Pulse 70    Temp 36.9 °C (98.4 °F)    Resp 16    Ht 5' 11" (1.803 m)    Wt 68 kg (150 lb)    SpO2 100%    BMI 20.92 kg/m2       Pain/Carmen Score: Pain Assessment Performed: Yes (3/8/2017  9:43 AM)  Presence of Pain: denies (3/8/2017  9:12 AM)      "

## 2017-03-13 ENCOUNTER — HOSPITAL ENCOUNTER (EMERGENCY)
Facility: HOSPITAL | Age: 27
Discharge: HOME | End: 2017-03-13
Attending: EMERGENCY MEDICINE
Payer: COMMERCIAL

## 2017-03-13 VITALS
DIASTOLIC BLOOD PRESSURE: 60 MMHG | HEART RATE: 76 BPM | RESPIRATION RATE: 18 BRPM | SYSTOLIC BLOOD PRESSURE: 132 MMHG | BODY MASS INDEX: 20.3 KG/M2 | WEIGHT: 145 LBS | OXYGEN SATURATION: 100 % | HEIGHT: 71 IN | TEMPERATURE: 98 F

## 2017-03-13 DIAGNOSIS — S29.019A STRAIN OF THORACIC REGION, INITIAL ENCOUNTER: Primary | ICD-10-CM

## 2017-03-13 PROCEDURE — 96372 THER/PROPH/DIAG INJ SC/IM: CPT

## 2017-03-13 PROCEDURE — 63600175 PHARM REV CODE 636 W HCPCS: Performed by: NURSE PRACTITIONER

## 2017-03-13 PROCEDURE — 99283 EMERGENCY DEPT VISIT LOW MDM: CPT | Mod: 25

## 2017-03-13 RX ORDER — KETOROLAC TROMETHAMINE 30 MG/ML
30 INJECTION, SOLUTION INTRAMUSCULAR; INTRAVENOUS
Status: COMPLETED | OUTPATIENT
Start: 2017-03-13 | End: 2017-03-13

## 2017-03-13 RX ORDER — METHOCARBAMOL 500 MG/1
1000 TABLET, FILM COATED ORAL 3 TIMES DAILY
Qty: 30 TABLET | Refills: 0 | Status: SHIPPED | OUTPATIENT
Start: 2017-03-13 | End: 2017-03-18

## 2017-03-13 RX ORDER — NAPROXEN 500 MG/1
500 TABLET ORAL 2 TIMES DAILY WITH MEALS
Qty: 14 TABLET | Refills: 0 | Status: SHIPPED | OUTPATIENT
Start: 2017-03-13 | End: 2017-11-30

## 2017-03-13 RX ORDER — ORPHENADRINE CITRATE 30 MG/ML
60 INJECTION INTRAMUSCULAR; INTRAVENOUS
Status: COMPLETED | OUTPATIENT
Start: 2017-03-13 | End: 2017-03-13

## 2017-03-13 RX ADMIN — ORPHENADRINE CITRATE 60 MG: 30 INJECTION INTRAMUSCULAR; INTRAVENOUS at 12:03

## 2017-03-13 RX ADMIN — KETOROLAC TROMETHAMINE 30 MG: 30 INJECTION, SOLUTION INTRAMUSCULAR at 12:03

## 2017-03-13 NOTE — ED AVS SNAPSHOT
[facility]  [address]  [facility] [address]               Adi Coleman III   3/13/2017 12:13 PM   ED    Description:  Male : 1990   Department:  [facility]           Your Care was Coordinated By:     Provider Role From To    Roscoe Joyner MD Attending Provider 17 1676 --    CEASAR Robertson Nurse Practitioner 17 1218 --      Reason for Visit     Mid-back Pain           Diagnoses this Visit        Comments    Strain of thoracic region, initial encounter    -  Primary       ED Disposition     None           To Do List           Follow-up Information     Follow up with [facility]. Schedule an appointment as soon as possible for a visit in 3 days.    Specialty:  Family Medicine    Contact information:    [address], [address]  [facility] [address] [phone]       These Medications        Disp Refills Start End    naproxen (NAPROSYN) 500 MG tablet 14 tablet 0 3/13/2017     Take 1 tablet (500 mg total) by mouth 2 (two) times daily with meals. - Oral    Pharmacy: [facility] [address] [place] [address] [employer] [facility] Ph #: [phone]       methocarbamol (ROBAXIN) 500 MG Tab 30 tablet 0 3/13/2017 3/18/2017    Take 2 tablets (1,000 mg total) by mouth 3 (three) times daily. - Oral    Pharmacy: [facility] [address] [place] [address] [facility] Ph #: [phone]         [facility]     [facility] Care [facility] -  Assistance  Registered nurses in the [facility] provide clinical advisement, health education, appointment booking, and other advisory services.  Call for this free service at [phone].             Medications           Message regarding Medications     Verify the changes and/or additions to your medication regime listed below are the same as discussed  "with your clinician today.  If any of these changes or additions are incorrect, please notify your healthcare provider.        START taking these NEW medications        Refills    naproxen (NAPROSYN) 500 MG tablet 0    Sig: Take 1 tablet (500 mg total) by mouth 2 (two) times daily with meals.    Class: Print    Route: Oral    methocarbamol (ROBAXIN) 500 MG Tab 0    Sig: Take 2 tablets (1,000 mg total) by mouth 3 (three) times daily.    Class: Print    Route: Oral      These medications were administered today        Dose Freq    ketorolac injection 30 mg 30 mg ED 1 Time    Sig: Inject 30 mg into the muscle ED 1 Time.    Class: Normal    Route: Intramuscular    orphenadrine injection 60 mg 60 mg ED 1 Time    Sig: Inject 2 mLs (60 mg total) into the muscle ED 1 Time.    Class: Normal    Route: Intramuscular           Verify that the below list of medications is an accurate representation of the medications you are currently taking.  If none reported, the list may be blank. If incorrect, please contact your healthcare provider. Carry this list with you in case of emergency.           Current Medications     alprazolam (XANAX) 0.5 MG tablet Take 1 tablet (0.5 mg total) by mouth 3 (three) times daily as needed for Anxiety.    hydrocortisone-pramoxine (PROCTOFOAM-HS) rectal foam Place 1 applicator rectally 2 (two) times daily.    mesalamine (CANASA) 1000 MG Supp Place 0.5 suppositories (500 mg total) rectally nightly. Canasa suppository twice a day for 10 days then once daily at bedtime    methocarbamol (ROBAXIN) 500 MG Tab Take 2 tablets (1,000 mg total) by mouth 3 (three) times daily.    naproxen (NAPROSYN) 500 MG tablet Take 1 tablet (500 mg total) by mouth 2 (two) times daily with meals.           Clinical Reference Information           Your Vitals Were     BP Pulse Temp Resp Height Weight    132/60 (BP Location: Right arm, Patient Position: Sitting) 76 97.8 °F (36.6 °C) (Oral) 18 5' 11" (1.803 m) 65.8 kg (145 lb)    " SpO2 BMI             100% 20.22 kg/m2         Allergies as of 3/13/2017     No Known Allergies      Immunizations Administered on Date of Encounter - 3/13/2017     None      ED Micro, Lab, POCT     None      ED Imaging Orders     None      Discharge References/Attachments     BACK SPRAIN/STRAIN (ENGLISH)      [facility] Sign-Up     Activating your [facility] account is as easy as 1-2-3!     1) Visit [email or web address], select Sign Up Now, enter this activation code and your date of birth, then select Next.  [email or web address]  Expires: 3/20/2017  1:24 PM      2) Create a username and password to use when you visit [facility] in the future and select a security question in case you lose your password and select Next.    3) Enter your e-mail address and click Sign Up!    Additional Information  If you have questions, please e-mail [email or web address] or call [phone] to talk to our [facility] staff. Remember, [facility] is NOT to be used for urgent needs. For medical emergencies, dial 911.          [facility] complies with applicable Federal civil rights laws and does not discriminate on the basis of race, color, national origin, age, disability, or sex.        Language Assistance Services     ATTENTION: Language assistance services are available, free of charge. Please call [phone].      ATENCIÓN: Si habla español, tiene a morillo disposición servicios gratuitos de asistencia lingüística. Llame al [phone].     CHÚ Ý: N?u b?n nói Ti?ng Vi?t, có các d?ch v? h? tr? ngôn ng? mi?n phí dành cho b?n. G?i s? [phone].

## 2017-03-13 NOTE — ED PROVIDER NOTES
Encounter Date: 3/13/2017       History     Chief Complaint   Patient presents with    Mid-back Pain     low thoracic/high lumbar pain after working out yesterday, denies radiation     Review of patient's allergies indicates:  No Known Allergies  HPI Comments: 25yo previously healthy male here for mid back pain after working out at the gym yesterday.  Reports lifting weights.  Denies known trauma, fever, numbness/tingling, weakness, abd pain.  He has tried nothing for his symptoms.  Pt has no previous history of back problems.      Patient is a 26 y.o. male presenting with the following complaint: back pain. The history is provided by the patient.   Back Pain    This is a new problem. The current episode started today. The problem occurs constantly. The problem has been unchanged. The pain is associated with lifting heavy objects. The pain is present in the thoracic spine. The quality of the pain is described as aching. The pain does not radiate. The pain is at a severity of 9/10. The symptoms are aggravated by certain positions. The pain is the same all the time. Pertinent negatives include no chest pain, no fever, no numbness, no headaches, no abdominal pain, no bowel incontinence, no perianal numbness, no bladder incontinence, no dysuria, no pelvic pain, no leg pain, no paresthesias, no paresis, no tingling and no weakness. He has tried nothing for the symptoms. The treatment provided no relief.     Past Medical History:   Diagnosis Date    Asthma     Depression      Past Surgical History:   Procedure Laterality Date    COLONOSCOPY N/A 3/8/2017    Procedure: COLONOSCOPY;  Surgeon: Max Cullen Jr., MD;  Location[facility];  Service: Endoscopy;  Laterality: N/A;    TONSILLECTOMY       History reviewed. No pertinent family history.  Social History   Substance Use Topics    Smoking status: Former Smoker    Smokeless tobacco: None      Comment: quit 7 yrs ago    Alcohol use No     Review of Systems    Constitutional: Negative for activity change, fatigue and fever.   HENT: Negative for facial swelling, nosebleeds and trouble swallowing.    Respiratory: Negative for chest tightness and shortness of breath.    Cardiovascular: Negative for chest pain.   Gastrointestinal: Negative for abdominal pain, bowel incontinence, diarrhea, nausea and vomiting.   Genitourinary: Negative for bladder incontinence, dysuria, hematuria and pelvic pain.   Musculoskeletal: Positive for back pain. Negative for gait problem, joint swelling, myalgias and neck pain.   Skin: Negative.    Neurological: Negative for tingling, weakness, numbness, headaches and paresthesias.   Psychiatric/Behavioral: Negative for confusion.   All other systems reviewed and are negative.      Physical Exam   Initial Vitals   BP Pulse Resp Temp SpO2   03/13/17 1153 03/13/17 1153 03/13/17 1153 03/13/17 1153 03/13/17 1153   132/60 76 18 97.8 °F (36.6 °C) 100 %     Physical Exam    Nursing note and vitals reviewed.  Constitutional: Vital signs are normal. He appears well-developed and well-nourished. He is active and cooperative.  Non-toxic appearance. He does not have a sickly appearance. He does not appear ill. No distress.   HENT:   Head: Normocephalic and atraumatic.   Mouth/Throat: Uvula is midline, oropharynx is clear and moist and mucous membranes are normal.   Eyes: Conjunctivae are normal.   Neck: Normal range of motion.   Cardiovascular: Normal rate, regular rhythm and normal heart sounds.   Pulmonary/Chest: Effort normal and breath sounds normal.   Abdominal: Soft. Normal appearance and bowel sounds are normal. There is no tenderness. There is no guarding.   Musculoskeletal:        Cervical back: Normal.        Thoracic back: He exhibits tenderness, pain and spasm. He exhibits normal range of motion, no bony tenderness, no swelling, no edema, no deformity, no laceration and normal pulse.        Back:    No  Bony tenderness.  SLR negative bilaterally.      Neurological: He is alert and oriented to person, place, and time. GCS eye subscore is 4. GCS verbal subscore is 5. GCS motor subscore is 6.   Skin: Skin is warm, dry and intact. No bruising and no rash noted. No erythema.   Psychiatric: He has a normal mood and affect. His speech is normal and behavior is normal. Judgment and thought content normal. Cognition and memory are normal.         ED Course   Procedures  Labs Reviewed - No data to display          Medical Decision Making:   Initial Assessment:   27yo male with back pain after lifting weights at the gym yesterday.  No trauma.  Pt appears well, nontoxic.  Vitals stable.  Paraspinal tenderness and spasm of T-spine.  No midline bony tenderness.    Differential Diagnosis:   Strain, sprain, spasm  ED Management:  IM Norflex, IM toradol  Feel the pt has a muscle strain due to lifting weights.  Advised rest.  RX Naprosyn and Robaxin.  Advised f/u with PCP or [facility] within 3 days and return to the ED if condition changes, progresses, or if there are concerns.  Pt verbalized understanding and compliance.               Attending Attestation:     Physician Attestation Statement for NP/PA:   I have conducted a face to face encounter with this patient in addition to the NP/PA, due to Medical Complexity    Other NP/PA Attestation Additions:    History of Present Illness: agree   Physical Exam: agree                  ED Course     Clinical Impression:   The encounter diagnosis was Strain of thoracic region, initial encounter.          CEASAR Robertson  03/13/17 2005       Roscoe Joyner MD  03/15/17 0646

## 2017-11-30 ENCOUNTER — HOSPITAL ENCOUNTER (EMERGENCY)
Facility: HOSPITAL | Age: 27
Discharge: HOME | End: 2017-11-30
Attending: EMERGENCY MEDICINE
Payer: COMMERCIAL

## 2017-11-30 VITALS
HEART RATE: 88 BPM | WEIGHT: 150 LBS | DIASTOLIC BLOOD PRESSURE: 80 MMHG | SYSTOLIC BLOOD PRESSURE: 135 MMHG | BODY MASS INDEX: 21 KG/M2 | RESPIRATION RATE: 20 BRPM | HEIGHT: 71 IN | OXYGEN SATURATION: 98 % | TEMPERATURE: 99 F

## 2017-11-30 DIAGNOSIS — J06.9 UPPER RESPIRATORY TRACT INFECTION, UNSPECIFIED TYPE: Primary | ICD-10-CM

## 2017-11-30 DIAGNOSIS — J01.01 ACUTE RECURRENT MAXILLARY SINUSITIS: ICD-10-CM

## 2017-11-30 PROCEDURE — 99283 EMERGENCY DEPT VISIT LOW MDM: CPT | Mod: 25

## 2017-11-30 PROCEDURE — 96372 THER/PROPH/DIAG INJ SC/IM: CPT

## 2017-11-30 PROCEDURE — 63600175 PHARM REV CODE 636 W HCPCS: Performed by: EMERGENCY MEDICINE

## 2017-11-30 RX ORDER — KETOROLAC TROMETHAMINE 30 MG/ML
30 INJECTION, SOLUTION INTRAMUSCULAR; INTRAVENOUS
Status: COMPLETED | OUTPATIENT
Start: 2017-11-30 | End: 2017-11-30

## 2017-11-30 RX ADMIN — KETOROLAC TROMETHAMINE 30 MG: 30 INJECTION, SOLUTION INTRAMUSCULAR at 08:11

## 2017-11-30 NOTE — ED NOTES
"27 year old male presents to ed cc of nasal congestion with productive green mucus. Ear pain and sore throat. Patient states he has been around people "who have been sick."  "

## 2017-11-30 NOTE — ED PROVIDER NOTES
Encounter Date: 11/30/2017    SCRIBE #1 NOTE: I, Liliane Le, am scribing for, and in the presence of, Jonnathan Best MD .       History     Chief Complaint   Patient presents with    Sinusitis     Pt noted blood and greenish mucus when blowing nose, was seen at Urgent Care 2 weeks ago and was prescribed Amoxicillin and given a steroid shot. Pt states symptoms went away but now they are back.      The patient is a 27 y.o. male with hx of asthma that presents to the ED with a complaint of sore throat, green nasal discharge, and productive cough since 2 days ago. Pt states that he was seen at urgent care 2 weeks ago for similar symptoms and was prescribed amoxicillin and given a steroid shot which resolved the symptoms. For the last 2 days, he reports recurrent symptoms associate with b/l ear aching pain. Pt states that he has a 4 y.o. Son who is also sick with a flu.        The history is provided by the patient.     Review of patient's allergies indicates:  No Known Allergies  Past Medical History:   Diagnosis Date    Asthma     Depression      Past Surgical History:   Procedure Laterality Date    COLONOSCOPY N/A 3/8/2017    Procedure: COLONOSCOPY;  Surgeon: Max Cullen Jr., MD;  Location: [facility];  Service: Endoscopy;  Laterality: N/A;    MOUTH SURGERY      TONSILLECTOMY       History reviewed. No pertinent family history.  Social History   Substance Use Topics    Smoking status: Former Smoker    Smokeless tobacco: Not on file      Comment: quit 7 yrs ago    Alcohol use No     Review of Systems   Constitutional: Negative for chills, fatigue and fever.   HENT: Positive for congestion, ear pain, rhinorrhea, sore throat and tinnitus.    Eyes: Negative for photophobia, pain and redness.   Respiratory: Positive for cough (productive). Negative for choking and shortness of breath.    Cardiovascular: Negative for chest pain, palpitations and leg swelling.   Gastrointestinal: Negative for abdominal pain,  constipation, diarrhea, nausea and vomiting.   Genitourinary: Negative for dysuria, frequency and urgency.   Musculoskeletal: Negative for gait problem, neck pain and neck stiffness.   Skin: Negative for rash.   Neurological: Negative for seizures, speech difficulty, light-headedness, numbness and headaches.       Physical Exam     Initial Vitals [11/30/17 0822]   BP Pulse Resp Temp SpO2   136/68 101 20 98.8 °F (37.1 °C) 100 %      MAP       90.67         Physical Exam    Nursing note and vitals reviewed.  Constitutional: He appears well-developed and well-nourished. No distress.   HENT:   Head: Normocephalic and atraumatic.   Mouth/Throat: Mucous membranes are dry. Posterior oropharyngeal erythema present. No oropharyngeal exudate or posterior oropharyngeal edema.   Eyes: Conjunctivae and EOM are normal. Pupils are equal, round, and reactive to light.   Neck: Normal range of motion. Neck supple. No tracheal deviation present.   Cardiovascular: Normal rate, regular rhythm, normal heart sounds and intact distal pulses.   Pulmonary/Chest: Breath sounds normal. No respiratory distress. He has no wheezes. He has no rhonchi. He has no rales.   Abdominal: Soft. Bowel sounds are normal. He exhibits no distension. There is no tenderness. There is no rebound and no guarding.   Musculoskeletal: Normal range of motion. He exhibits no edema or tenderness.   Lymphadenopathy:     He has no cervical adenopathy.   Neurological: He is alert and oriented to person, place, and time. He has normal strength. No cranial nerve deficit or sensory deficit.   Skin: Skin is warm and dry. Capillary refill takes less than 2 seconds.         ED Course   Procedures  Labs Reviewed - No data to display          Medical Decision Making:   History:   Old Medical Records: I decided to obtain old medical records.  Initial Assessment:   The patient is a 27 y.o. male with hx of asthma that presents to the ED with a complaint of sore throat and productive  cough and green/bloody nasal mucous since 2 days ago.  Differential Diagnosis:   URI, sinusitis, peritonsillar worse retropharyngeal abscess, pneumonia  ED Management:  Patient given IM Toradol with significant improvement in symptoms.  We discussed disposition including diagnosis of viral URI, as patient does not meet [facility] criteria for strep pharyngitis.  Instructed to follow-up with primary care physician, instructed on home management including Motrin and Tylenol as needed for pain, increase IV hydration with Gatorade G2 or Powerade light, over-the-counter medication such as Nasonex or Mucinex, follow-up with PCP and return with new or worsening symptoms.  Patient expressed good understanding and is comfortable with discharge at this time.                   ED Course      Clinical Impression:   The primary encounter diagnosis was Upper respiratory tract infection, unspecified type. A diagnosis of Acute recurrent maxillary sinusitis was also pertinent to this visit.    Disposition:   Disposition: Discharged  Condition: Stable    I, Dr. Jonnathan Best, personally performed the services described in this documentation. All medical record entries made by the scribe were at my direction and in my presence.  I have reviewed the chart and agree that the record reflects my personal performance and is accurate and complete. Jonnathan Best MD.  2:19 AM 12/13/2017                        Jonnathan Best MD  12/13/17 0222

## 2017-11-30 NOTE — DISCHARGE INSTRUCTIONS
I recommend taking ibuprofen 800mg every 8 hours with food starting after lunch today. I also recommend drinking gatorade G2 or Powerade Light to stay hydrated. I recommend purchasing over-the-counter Claritin D and Nasonex and taking daily as directed.

## 2017-11-30 NOTE — ED NOTES
APPEARANCE: Alert, oriented and in no acute distress.  CARDIAC: Normal rate and rhythm, no murmur heard.   PERIPHERAL VASCULAR: peripheral pulses present. Normal cap refill. No edema. Warm to touch.    RESPIRATORY:Normal rate and effort, breath sounds clear bilaterally throughout chest. Respirations are equal and unlabored no obvious signs of distress.  GASTRO: soft, bowel sounds normal, no tenderness, no abdominal distention.  MUSC: Full ROM. No bony tenderness or soft tissue tenderness. No obvious deformity.  SKIN: Skin is warm and dry, normal skin turgor, mucous membranes moist.  NEURO: 5/5 strength major flexors/extensors bilaterally. Sensory intact to light touch bilaterally. [place] coma scale: eyes open spontaneously-4, oriented & converses-5, obeys commands-6. No neurological abnormalities.   MENTAL STATUS: awake, alert and aware of environment.  EYE: PERRL, both eyes: pupils brisk and reactive to light. Normal size.

## 2018-10-20 NOTE — ANESTHESIA PREPROCEDURE EVALUATION
03/08/2017  Adi Coleman III is a 26 y.o., male for colonoscopy under MAC    [facility] Anesthesia Evaluation     I have reviewed the Nursing Notes.   I have reviewed the Medications.     Review of Systems  Anesthesia Hx:   Denies Personal Hx of Anesthesia complications.   Pulmonary:   Asthma   Hepatic/GI:   Bowel Prep.    Psych:   Psychiatric History depression          Physical Exam  General:  Well nourished       Chest/Lungs:  Chest/Lungs Clear    Heart/Vascular:  Heart Findings: Normal            Anesthesia Plan  Type of Anesthesia, risks & benefits discussed:  Anesthesia Type:  MAC  Patient's Preference:   Intra-op Monitoring Plan:   Intra-op Monitoring Plan Comments:   Post Op Pain Control Plan:   Post Op Pain Control Plan Comments:   Induction:    Beta Blocker:  Patient is not currently on a Beta-Blocker (No further documentation required).       Informed Consent: Patient understands risks and agrees with Anesthesia plan.  Questions answered. Anesthesia consent signed with patient.  ASA Score: 2     Day of Surgery Review of History & Physical:            Ready For Surgery From Anesthesia Perspective.        Chart reviewed and med rec performed. Declined PTA meds this AM as expecting to discharge home today. Hospitalist service will sign off, please contact if acute concerns arise - thank you.    JoAnna Barthell, PA-C  10/20/2018   4:09 PM

## 2018-12-19 ENCOUNTER — HOSPITAL ENCOUNTER (EMERGENCY)
Facility: HOSPITAL | Age: 28
Discharge: HOME | End: 2018-12-19
Attending: EMERGENCY MEDICINE
Payer: COMMERCIAL

## 2018-12-19 VITALS
OXYGEN SATURATION: 99 % | HEART RATE: 80 BPM | RESPIRATION RATE: 16 BRPM | BODY MASS INDEX: 21 KG/M2 | WEIGHT: 150 LBS | DIASTOLIC BLOOD PRESSURE: 66 MMHG | HEIGHT: 71 IN | SYSTOLIC BLOOD PRESSURE: 118 MMHG | TEMPERATURE: 99 F

## 2018-12-19 DIAGNOSIS — S39.012A STRAIN OF LUMBAR PARASPINOUS MUSCLE, INITIAL ENCOUNTER: Primary | ICD-10-CM

## 2018-12-19 DIAGNOSIS — M54.50 LOWER BACK PAIN: ICD-10-CM

## 2018-12-19 LAB
BILIRUB UR QL STRIP: NEGATIVE
CLARITY UR: CLEAR
COLOR UR: YELLOW
GLUCOSE UR QL STRIP: NEGATIVE
HGB UR QL STRIP: NEGATIVE
KETONES UR QL STRIP: NEGATIVE
LEUKOCYTE ESTERASE UR QL STRIP: NEGATIVE
NITRITE UR QL STRIP: NEGATIVE
PH UR STRIP: 6 [PH] (ref 5–8)
PROT UR QL STRIP: NEGATIVE
SP GR UR STRIP: 1.02 (ref 1–1.03)
URN SPEC COLLECT METH UR: NORMAL
UROBILINOGEN UR STRIP-ACNC: NEGATIVE EU/DL

## 2018-12-19 PROCEDURE — 81003 URINALYSIS AUTO W/O SCOPE: CPT

## 2018-12-19 PROCEDURE — 96372 THER/PROPH/DIAG INJ SC/IM: CPT

## 2018-12-19 PROCEDURE — 99284 EMERGENCY DEPT VISIT MOD MDM: CPT | Mod: 25

## 2018-12-19 PROCEDURE — 25000003 PHARM REV CODE 250: Performed by: EMERGENCY MEDICINE

## 2018-12-19 PROCEDURE — 63600175 PHARM REV CODE 636 W HCPCS: Performed by: EMERGENCY MEDICINE

## 2018-12-19 RX ORDER — DIAZEPAM 2 MG/1
2 TABLET ORAL
Status: COMPLETED | OUTPATIENT
Start: 2018-12-19 | End: 2018-12-19

## 2018-12-19 RX ORDER — KETOROLAC TROMETHAMINE 10 MG/1
10 TABLET, FILM COATED ORAL EVERY 8 HOURS PRN
Qty: 12 TABLET | Refills: 0 | Status: SHIPPED | OUTPATIENT
Start: 2018-12-19 | End: 2019-02-15

## 2018-12-19 RX ORDER — METHOCARBAMOL 500 MG/1
1000 TABLET, FILM COATED ORAL
Status: COMPLETED | OUTPATIENT
Start: 2018-12-19 | End: 2018-12-19

## 2018-12-19 RX ORDER — CYCLOBENZAPRINE HCL 10 MG
10 TABLET ORAL 3 TIMES DAILY PRN
Qty: 21 TABLET | Refills: 0 | Status: SHIPPED | OUTPATIENT
Start: 2018-12-19 | End: 2018-12-24

## 2018-12-19 RX ORDER — ORPHENADRINE CITRATE 30 MG/ML
60 INJECTION INTRAMUSCULAR; INTRAVENOUS
Status: DISCONTINUED | OUTPATIENT
Start: 2018-12-19 | End: 2018-12-19

## 2018-12-19 RX ORDER — KETOROLAC TROMETHAMINE 30 MG/ML
30 INJECTION, SOLUTION INTRAMUSCULAR; INTRAVENOUS
Status: COMPLETED | OUTPATIENT
Start: 2018-12-19 | End: 2018-12-19

## 2018-12-19 RX ADMIN — METHOCARBAMOL TABLETS 1000 MG: 500 TABLET, COATED ORAL at 04:12

## 2018-12-19 RX ADMIN — KETOROLAC TROMETHAMINE 30 MG: 30 INJECTION, SOLUTION INTRAMUSCULAR at 04:12

## 2018-12-19 RX ADMIN — DIAZEPAM 2 MG: 2 TABLET ORAL at 06:12

## 2018-12-19 NOTE — ED NOTES
Patient presents to ER with c/o lower back pain, was at work lifting and pulled something in his lower back, no medication taken PO but have a pain patch on lower back, 8/10 on pain scale

## 2018-12-19 NOTE — ED PROVIDER NOTES
Encounter Date: 12/19/2018    SCRIBE #1 NOTE: I, Gabdiego Blackburn, am scribing for, and in the presence of,  Dr. Cunha. I have scribed the entire note.       History     Chief Complaint   Patient presents with    Back Pain     lower back pain after lifting something heavy today.  Pain with movement and/or urination     Time seen by provider: 2:50 PM    This is a 29 y/o male who presents with complaint of bilateral lower back pain after lifting something heavy today. Patient's pain is worsened with any movement and standing, also worse after taking a nap. Patient denies any dysuria, hematuria, numbness, tingling, weakness, bowel/bladder incontinence, or any other concerning symptoms. Patient has not taken any medication for his symptoms. He denies any prior history of similar symptoms or back surgeries.     Patient is requesting not to be given any narcotic pain medication, as he is detoxing from pain pills. He also reports recent trouble sleeping due to generalized myalgias.      The history is provided by the patient.     Review of patient's allergies indicates:  No Known Allergies  Past Medical History:   Diagnosis Date    Asthma     Depression      Past Surgical History:   Procedure Laterality Date    COLONOSCOPY N/A 3/8/2017    Procedure: COLONOSCOPY;  Surgeon: Max Cullen Jr., MD;  Location: [facility];  Service: Endoscopy;  Laterality: N/A;    COLONOSCOPY N/A 3/8/2017    Performed by Max Cullen Jr., MD at [facility] ENDO    MOUTH SURGERY      TONSILLECTOMY       History reviewed. No pertinent family history.  Social History     Tobacco Use    Smoking status: Former Smoker    Tobacco comment: quit 7 yrs ago   Substance Use Topics    Alcohol use: No    Drug use: No     Review of Systems   Constitutional: Negative for chills and fever.   HENT: Negative for facial swelling and trouble swallowing.    Eyes: Negative for redness.   Respiratory: Negative for shortness of breath.    Cardiovascular:  Negative for chest pain.   Gastrointestinal: Negative for abdominal pain, diarrhea, nausea and vomiting.   Genitourinary: Negative for dysuria and hematuria.   Musculoskeletal: Positive for back pain. Negative for gait problem.   Skin: Negative for rash.   Neurological: Negative for facial asymmetry and speech difficulty.     Physical Exam     Initial Vitals [12/19/18 1416]   BP Pulse Resp Temp SpO2   134/60 92 20 98.5 °F (36.9 °C) 99 %      MAP       --         Physical Exam    Nursing note and vitals reviewed.  Constitutional: He appears well-developed and well-nourished. He is not diaphoretic. No distress.   HENT:   Head: Normocephalic and atraumatic.   Right Ear: External ear normal.   Left Ear: External ear normal.   Nose: Nose normal.   Eyes: Conjunctivae and EOM are normal.   Neck: Neck supple.   Cardiovascular: Normal rate, regular rhythm and normal heart sounds. Exam reveals no friction rub.    No murmur heard.  Pulmonary/Chest: Breath sounds normal. No respiratory distress. He has no wheezes. He has no rhonchi. He has no rales.   Abdominal: Soft. He exhibits no distension. There is no tenderness. There is no rebound and no guarding.   Musculoskeletal: Normal range of motion. He exhibits tenderness. He exhibits no edema.   TTP to the lumbar paraspinal muscles bilaterally  No bony TTP to the lumbar, thoracic and cervical spines, normal alignment, no step-offs   Neurological: He is alert and oriented to person, place, and time. He has normal strength. No cranial nerve deficit or sensory deficit. GCS score is 15. GCS eye subscore is 4. GCS verbal subscore is 5. GCS motor subscore is 6.   Symmetric strength in lower extremities bilaterally   Skin: Skin is warm and dry.   Psychiatric: He has a normal mood and affect.       ED Course   Procedures  Labs Reviewed   URINALYSIS            X-Rays:   Independently Interpreted Readings:   Other Readings:  Reviewed by myself, read by radiology.     Imaging Results           X-Ray Lumbar Spine Ap And Lateral (Final result)  Result time 12/19/18 16:55:27    Final result by Urban Briones MD (12/19/18 16:55:27)                 Impression:      1. No acute osseous abnormalities.      Electronically signed by: Urban Briones MD  Date:    12/19/2018  Time:    16:55             Narrative:    EXAMINATION:  XR LUMBAR SPINE AP AND LATERAL    CLINICAL HISTORY:  Low back pain, <6wks, no red flags, no prior management;Low back pain    TECHNIQUE:  AP, lateral and spot images were performed of the lumbar spine.    COMPARISON:  None    FINDINGS:  Lumbar spine alignment demonstrates mild apparent levoscoliosis, possibly positional.  No spondylolisthesis.  Vertebral body heights are well maintained without evidence for fracture.  No lytic or blastic lesions.  Intervertebral disc spaces are preserved.  Pedicles, spinous processes and transverse processes are intact.  SI joints are symmetric.  Sacrum is unremarkable.                              Medical Decision Making:   Initial Assessment:   This is a 27 y/o male who presents with complaint of bilateral lower back pain after lifting something heavy today.   Differential Diagnosis:   Differential diagnosis includes, but is not limited to:  Muscular pain, herniated disc, spine fracture, intra-abdominal causes and urinary tract infection, dehydration.     Clinical Tests:   Lab Tests: Ordered and Reviewed  Radiological Study: Ordered and Reviewed  ED Management:  Upon re-evaluation, the patient's status has improved.  After complete ED evaluation, clinical impression is most consistent with lumbar strain.  PCP follow-up within 1 week was recommended.    After taking into careful account the patient's history, physical exam findings, as well as empirical and objective data obtained throughout ED workup, I feel no emergent medical condition has been identified. No further evaluation or admission was felt to be required, and the patient is stable for  discharge from the ED. The patient and any additional family present were updated with test results, overall clinical impression, and recommended further plan of care, including discharge instructions as provided and outpatient follow-up for continued evaluation and management as needed. All questions were answered. The patient expressed understanding and agreed with current plan for discharge and follow-up plan of care. Strict ED return precautions were provided, including return/worsening of current symptoms, new symptoms, or any other concerns.                     ED Course as of Dec 19 1801   Wed Dec 19, 2018   1748 Patient states that pain has improved some.  He does not want any narcotics.  [LD]      ED Course User Index  [LD] Anne Cunha MD     Clinical Impression:     1. Strain of lumbar paraspinous muscle, initial encounter    2. Lower back pain          Disposition:   Disposition: Discharged  Condition: Stable    I, Anne Cunha,  personally performed the services described in this documentation. All medical record entries made by the scribe were at my direction and in my presence.  I have reviewed the chart and agree that the record reflects my personal performance and is accurate and complete. Anne Cunha M.D. 6:03 PM12/19/2018                   Anne Cunha MD  12/19/18 1809

## 2019-02-15 ENCOUNTER — TELEPHONE (OUTPATIENT)
Dept: ENDOSCOPY | Facility: HOSPITAL | Age: 29
End: 2019-02-15

## 2019-02-15 ENCOUNTER — TELEPHONE (OUTPATIENT)
Dept: GASTROENTEROLOGY | Facility: CLINIC | Age: 29
End: 2019-02-15

## 2019-02-15 ENCOUNTER — LAB VISIT (OUTPATIENT)
Dept: LAB | Facility: HOSPITAL | Age: 29
End: 2019-02-15
Attending: INTERNAL MEDICINE
Payer: COMMERCIAL

## 2019-02-15 ENCOUNTER — OFFICE VISIT (OUTPATIENT)
Dept: GASTROENTEROLOGY | Facility: CLINIC | Age: 29
End: 2019-02-15
Payer: COMMERCIAL

## 2019-02-15 VITALS
DIASTOLIC BLOOD PRESSURE: 70 MMHG | BODY MASS INDEX: 22.41 KG/M2 | WEIGHT: 160.06 LBS | HEIGHT: 71 IN | SYSTOLIC BLOOD PRESSURE: 124 MMHG | HEART RATE: 76 BPM

## 2019-02-15 DIAGNOSIS — R10.9 ABDOMINAL CRAMPS: ICD-10-CM

## 2019-02-15 DIAGNOSIS — K62.89 PROCTITIS: ICD-10-CM

## 2019-02-15 DIAGNOSIS — K92.1 HEMATOCHEZIA: Primary | ICD-10-CM

## 2019-02-15 DIAGNOSIS — L81.8 TATTOO OF SKIN: ICD-10-CM

## 2019-02-15 DIAGNOSIS — K92.1 HEMATOCHEZIA: ICD-10-CM

## 2019-02-15 DIAGNOSIS — D64.9 LOW HEMOGLOBIN: ICD-10-CM

## 2019-02-15 DIAGNOSIS — D64.9 ANEMIA, UNSPECIFIED TYPE: ICD-10-CM

## 2019-02-15 DIAGNOSIS — Z12.11 SPECIAL SCREENING FOR MALIGNANT NEOPLASMS, COLON: ICD-10-CM

## 2019-02-15 DIAGNOSIS — L81.8 HISTORY OF TATTOO: ICD-10-CM

## 2019-02-15 LAB
ALBUMIN SERPL BCP-MCNC: 4.3 G/DL
ALP SERPL-CCNC: 64 U/L
ALT SERPL W/O P-5'-P-CCNC: 17 U/L
ANION GAP SERPL CALC-SCNC: 6 MMOL/L
AST SERPL-CCNC: 19 U/L
BASOPHILS # BLD AUTO: 0.05 K/UL
BASOPHILS NFR BLD: 0.7 %
BILIRUB DIRECT SERPL-MCNC: 0.2 MG/DL
BILIRUB SERPL-MCNC: 0.6 MG/DL
BUN SERPL-MCNC: 18 MG/DL
CALCIUM SERPL-MCNC: 9.5 MG/DL
CHLORIDE SERPL-SCNC: 102 MMOL/L
CO2 SERPL-SCNC: 33 MMOL/L
CREAT SERPL-MCNC: 1 MG/DL
DIFFERENTIAL METHOD: ABNORMAL
EOSINOPHIL # BLD AUTO: 0.1 K/UL
EOSINOPHIL NFR BLD: 1.7 %
ERYTHROCYTE [DISTWIDTH] IN BLOOD BY AUTOMATED COUNT: 11.6 %
ERYTHROCYTE [SEDIMENTATION RATE] IN BLOOD BY WESTERGREN METHOD: <2 MM/HR
EST. GFR  (AFRICAN AMERICAN): >60 ML/MIN/1.73 M^2
EST. GFR  (NON AFRICAN AMERICAN): >60 ML/MIN/1.73 M^2
FERRITIN SERPL-MCNC: 115 NG/ML
GLUCOSE SERPL-MCNC: 64 MG/DL
HCT VFR BLD AUTO: 41.2 %
HGB BLD-MCNC: 13.7 G/DL
IGA SERPL-MCNC: 83 MG/DL
IMM GRANULOCYTES # BLD AUTO: 0.03 K/UL
IMM GRANULOCYTES NFR BLD AUTO: 0.4 %
IRON SERPL-MCNC: 78 UG/DL
LIPASE SERPL-CCNC: 25 U/L
LYMPHOCYTES # BLD AUTO: 2.6 K/UL
LYMPHOCYTES NFR BLD: 38.1 %
MCH RBC QN AUTO: 29.6 PG
MCHC RBC AUTO-ENTMCNC: 33.3 G/DL
MCV RBC AUTO: 89 FL
MONOCYTES # BLD AUTO: 0.6 K/UL
MONOCYTES NFR BLD: 8.4 %
NEUTROPHILS # BLD AUTO: 3.5 K/UL
NEUTROPHILS NFR BLD: 50.7 %
NRBC BLD-RTO: 0 /100 WBC
PLATELET # BLD AUTO: 257 K/UL
PMV BLD AUTO: 10.1 FL
POTASSIUM SERPL-SCNC: 3.8 MMOL/L
PROT SERPL-MCNC: 6.8 G/DL
RBC # BLD AUTO: 4.63 M/UL
SATURATED IRON: 24 %
SODIUM SERPL-SCNC: 141 MMOL/L
TOTAL IRON BINDING CAPACITY: 321 UG/DL
TRANSFERRIN SERPL-MCNC: 217 MG/DL
WBC # BLD AUTO: 6.93 K/UL

## 2019-02-15 PROCEDURE — 82784 ASSAY IGA/IGD/IGG/IGM EACH: CPT

## 2019-02-15 PROCEDURE — 3008F BODY MASS INDEX DOCD: CPT | Mod: CPTII,S$GLB,, | Performed by: INTERNAL MEDICINE

## 2019-02-15 PROCEDURE — 82728 ASSAY OF FERRITIN: CPT

## 2019-02-15 PROCEDURE — 99999 PR PBB SHADOW E&M-EST. PATIENT-LVL III: CPT | Mod: PBBFAC,,, | Performed by: INTERNAL MEDICINE

## 2019-02-15 PROCEDURE — 80074 ACUTE HEPATITIS PANEL: CPT

## 2019-02-15 PROCEDURE — 80048 BASIC METABOLIC PNL TOTAL CA: CPT

## 2019-02-15 PROCEDURE — 86706 HEP B SURFACE ANTIBODY: CPT

## 2019-02-15 PROCEDURE — 83516 IMMUNOASSAY NONANTIBODY: CPT

## 2019-02-15 PROCEDURE — 83540 ASSAY OF IRON: CPT

## 2019-02-15 PROCEDURE — 85025 COMPLETE CBC W/AUTO DIFF WBC: CPT

## 2019-02-15 PROCEDURE — 80076 HEPATIC FUNCTION PANEL: CPT

## 2019-02-15 PROCEDURE — 3008F PR BODY MASS INDEX (BMI) DOCUMENTED: ICD-10-PCS | Mod: CPTII,S$GLB,, | Performed by: INTERNAL MEDICINE

## 2019-02-15 PROCEDURE — 85652 RBC SED RATE AUTOMATED: CPT

## 2019-02-15 PROCEDURE — 99214 OFFICE O/P EST MOD 30 MIN: CPT | Mod: S$GLB,,, | Performed by: INTERNAL MEDICINE

## 2019-02-15 PROCEDURE — 99214 PR OFFICE/OUTPT VISIT, EST, LEVL IV, 30-39 MIN: ICD-10-PCS | Mod: S$GLB,,, | Performed by: INTERNAL MEDICINE

## 2019-02-15 PROCEDURE — 83690 ASSAY OF LIPASE: CPT

## 2019-02-15 PROCEDURE — 86704 HEP B CORE ANTIBODY TOTAL: CPT

## 2019-02-15 PROCEDURE — 86703 HIV-1/HIV-2 1 RESULT ANTBDY: CPT

## 2019-02-15 PROCEDURE — 86592 SYPHILIS TEST NON-TREP QUAL: CPT

## 2019-02-15 PROCEDURE — 99999 PR PBB SHADOW E&M-EST. PATIENT-LVL III: ICD-10-PCS | Mod: PBBFAC,,, | Performed by: INTERNAL MEDICINE

## 2019-02-15 PROCEDURE — 86790 VIRUS ANTIBODY NOS: CPT

## 2019-02-15 RX ORDER — SODIUM, POTASSIUM,MAG SULFATES 17.5-3.13G
1 SOLUTION, RECONSTITUTED, ORAL ORAL ONCE
Qty: 1 BOTTLE | Refills: 0 | Status: SHIPPED | OUTPATIENT
Start: 2019-02-15 | End: 2019-02-15

## 2019-02-15 RX ORDER — MESALAMINE 1000 MG/1
1000 SUPPOSITORY RECTAL NIGHTLY
Qty: 30 SUPPOSITORY | Refills: 5 | Status: SHIPPED | OUTPATIENT
Start: 2019-02-15 | End: 2019-04-05 | Stop reason: SDUPTHER

## 2019-02-15 NOTE — H&P (VIEW-ONLY)
CHIEF COMPLAINT:  Painless hematochezia for the past three weeks.    HISTORY OF PRESENT ILLNESS:  This is a 28-year-old white male who states for the   past three weeks he has been having intermittent painless hematochezia on the   stool, in the stool ball, not just when he wipes.  He states he had an episode   like this maybe two years ago, had a colonoscopy, had proctitis.  He thinks he   did some foam, that did not really help at all, does not really recall doing   Canasa suppositories, has been taking some NSAIDs, having some lower abdominal   cramps as well, having two bowel movements a day, no diarrhea, no straining   whatsoever.  He has not had any weight loss at all.  He is concerned about it.    He comes in with several pictures on his cell phone of the blood.  Looking at   his last labs, he is slightly anemic last time they checked.  We told him to   avoid NSAIDs and to limit his alcohol intake.    REVIEW OF SYSTEMS:  CONSTITUTIONAL:  No fever, fatigue or weight loss.  EYES:  No visual disturbances.  ENT:  No difficulty swallowing or sore throat.  CARDIOVASCULAR:  No chest pain or palpitations.  RESPIRATORY:  No shortness of breath or cough.  GENITOURINARY:  No dysuria, urgency, frequency or hematuria.  MUSCULOSKELETAL:  He denies any arthritis.  SKIN:  No itching or rash.  He does have multiple tattoos, over 30 he says.  NEUROLOGIC:  No headache, syncope or stroke.  PSYCHIATRIC:  No uncontrolled depression or anxiety.  ENDOCRINE:  No cold or heat intolerance.  LYMPHATICS:  No lymphadenopathy.  ALLERGIES:  No known drug allergies.  GASTROINTESTINAL:  No nausea or vomiting.  No heartburn.  He does have bilateral   lower abdominal cramps, improved with bowel movements.  No early satiety.  No   constipation, no diarrhea.  He has been having inés hematochezia, painless, for   the last three weeks.    RISK FACTORS FOR LIVER DISEASE:  He has a professional tattoo as well as several   homemade tattoos.  No IV  drugs.  No nasal drug use.  No blood transfusion ever.    PAST MEDICAL HISTORY:  Negative for diabetes, heart disease, lung disease, renal   disease, hypertension, hyperlipidemia or jaundice.  Does have a history of some   proctitis in the past.    PAST SURGICAL HISTORY:  Just tonsillectomy.    FAMILY HISTORY:  Nobody with colon cancer.  Nobody with advanced colon   adenomatous polyps.  No FAP, no attenuated FAP, no MAP, no Jung syndrome.    Nobody with celiac sprue or inflammatory bowel disease.  Nobody with chronic   hepatitis, cirrhosis of the liver, liver cancer, pancreatitis or pancreatic   cancer.    SOCIAL HISTORY:  Does drink alcohol, but rarely he says.  He does not smoke.  He   works in [employer] for the last nine years at the [employer] here   in [place].  He is single, never .  He has one healthy 5-year-old son   who lives with him.  He is a single parent he says.    PHYSICAL EXAMINATION:  VITAL SIGNS:  Blood pressure is 124/70, pulse 76 and regular, 5 feet 11 inches   tall, 160 pounds, BMI is 22.32.  GENERAL:  He is alert and oriented x4, not in any acute distress.  ABDOMEN:  Muscular.  It is thin.  Nondistended.  No guarding.  No rebound.  No   tenderness.  No palpable organomegaly.  No bruits.  No pulsatile masses.  No   stigmata of chronic liver disease.  No appreciative ascites or hernias.    Normoactive bowel sounds.  No Humphreys sign.  No McBurney point tenderness.  No   CVA tenderness.  Multiple tattoos.  EYES:  Conjunctivae are anicteric.  CARDIOVASCULAR:  S1 and S2 without murmurs, gallops or rubs.  RESPIRATORY:  Clear to auscultation bilaterally without wheezes, rhonchi or   rales.  SKIN:  No petechiae or rash on exposed skin areas.  NEUROLOGIC:  Alert and oriented x4.  PSYCHIATRIC:  Normal speech, mentation and affect.  LYMPHATICS:  No cervical or supraclavicular lymphadenopathy.  No appreciative   thyromegaly.    MEDICAL DECISION MAKING:  As above.  EGD and colonoscopy  talk given.  Avoid   NSAID talk given.  Prior colonoscopy and path has been reviewed.  Homemade   tattoo talk given.  The patient shows pictures of what looks like what he is   calling an infection and his rectal discharge as well.  Denies any high-risk   behavior.    IMPRESSION AND PLAN:  Hematochezia for the last three weeks, painless, with   slight anemia, also history of taking NSAIDs and proctitis.  We will set up the   patient for lab work.  The patient says okay to check labs.  We will also set up   for an EGD and colonoscopy first available for further evaluation.  Recommend   the patient return to GI Clinic in eight weeks for followup.      SEC/HN  dd: 02/15/2019 14:21:11 (CST)  td: 02/16/2019 05:03:12 (CST)  Doc ID   #[id number]  Job ID #[id number]    CC:

## 2019-02-15 NOTE — PROGRESS NOTES
CHIEF COMPLAINT:  Painless hematochezia for the past three weeks.    HISTORY OF PRESENT ILLNESS:  This is a 28-year-old white male who states for the   past three weeks he has been having intermittent painless hematochezia on the   stool, in the stool ball, not just when he wipes.  He states he had an episode   like this maybe two years ago, had a colonoscopy, had proctitis.  He thinks he   did some foam, that did not really help at all, does not really recall doing   Canasa suppositories, has been taking some NSAIDs, having some lower abdominal   cramps as well, having two bowel movements a day, no diarrhea, no straining   whatsoever.  He has not had any weight loss at all.  He is concerned about it.    He comes in with several pictures on his cell phone of the blood.  Looking at   his last labs, he is slightly anemic last time they checked.  We told him to   avoid NSAIDs and to limit his alcohol intake.    REVIEW OF SYSTEMS:  CONSTITUTIONAL:  No fever, fatigue or weight loss.  EYES:  No visual disturbances.  ENT:  No difficulty swallowing or sore throat.  CARDIOVASCULAR:  No chest pain or palpitations.  RESPIRATORY:  No shortness of breath or cough.  GENITOURINARY:  No dysuria, urgency, frequency or hematuria.  MUSCULOSKELETAL:  He denies any arthritis.  SKIN:  No itching or rash.  He does have multiple tattoos, over 30 he says.  NEUROLOGIC:  No headache, syncope or stroke.  PSYCHIATRIC:  No uncontrolled depression or anxiety.  ENDOCRINE:  No cold or heat intolerance.  LYMPHATICS:  No lymphadenopathy.  ALLERGIES:  No known drug allergies.  GASTROINTESTINAL:  No nausea or vomiting.  No heartburn.  He does have bilateral   lower abdominal cramps, improved with bowel movements.  No early satiety.  No   constipation, no diarrhea.  He has been having inés hematochezia, painless, for   the last three weeks.    RISK FACTORS FOR LIVER DISEASE:  He has a professional tattoo as well as several   homemade tattoos.  No IV  drugs.  No nasal drug use.  No blood transfusion ever.    PAST MEDICAL HISTORY:  Negative for diabetes, heart disease, lung disease, renal   disease, hypertension, hyperlipidemia or jaundice.  Does have a history of some   proctitis in the past.    PAST SURGICAL HISTORY:  Just tonsillectomy.    FAMILY HISTORY:  Nobody with colon cancer.  Nobody with advanced colon   adenomatous polyps.  No FAP, no attenuated FAP, no MAP, no Jung syndrome.    Nobody with celiac sprue or inflammatory bowel disease.  Nobody with chronic   hepatitis, cirrhosis of the liver, liver cancer, pancreatitis or pancreatic   cancer.    SOCIAL HISTORY:  Does drink alcohol, but rarely he says.  He does not smoke.  He   works in [employer] for the last nine years at the [employer] here   in [place].  He is single, never .  He has one healthy 5-year-old son   who lives with him.  He is a single parent he says.    PHYSICAL EXAMINATION:  VITAL SIGNS:  Blood pressure is 124/70, pulse 76 and regular, 5 feet 11 inches   tall, 160 pounds, BMI is 22.32.  GENERAL:  He is alert and oriented x4, not in any acute distress.  ABDOMEN:  Muscular.  It is thin.  Nondistended.  No guarding.  No rebound.  No   tenderness.  No palpable organomegaly.  No bruits.  No pulsatile masses.  No   stigmata of chronic liver disease.  No appreciative ascites or hernias.    Normoactive bowel sounds.  No Humphreys sign.  No McBurney point tenderness.  No   CVA tenderness.  Multiple tattoos.  EYES:  Conjunctivae are anicteric.  CARDIOVASCULAR:  S1 and S2 without murmurs, gallops or rubs.  RESPIRATORY:  Clear to auscultation bilaterally without wheezes, rhonchi or   rales.  SKIN:  No petechiae or rash on exposed skin areas.  NEUROLOGIC:  Alert and oriented x4.  PSYCHIATRIC:  Normal speech, mentation and affect.  LYMPHATICS:  No cervical or supraclavicular lymphadenopathy.  No appreciative   thyromegaly.    MEDICAL DECISION MAKING:  As above.  EGD and colonoscopy  talk given.  Avoid   NSAID talk given.  Prior colonoscopy and path has been reviewed.  Homemade   tattoo talk given.  The patient shows pictures of what looks like what he is   calling an infection and his rectal discharge as well.  Denies any high-risk   behavior.    IMPRESSION AND PLAN:  Hematochezia for the last three weeks, painless, with   slight anemia, also history of taking NSAIDs and proctitis.  We will set up the   patient for lab work.  The patient says okay to check labs.  We will also set up   for an EGD and colonoscopy first available for further evaluation.  Recommend   the patient return to GI Clinic in eight weeks for followup.      SEC/HN  dd: 02/15/2019 14:21:11 (CST)  td: 02/16/2019 05:03:12 (CST)  Doc ID   #[id number]  Job ID #[id number]    CC:

## 2019-02-16 LAB — RPR SER QL: NORMAL

## 2019-02-18 ENCOUNTER — LAB VISIT (OUTPATIENT)
Dept: LAB | Facility: HOSPITAL | Age: 29
End: 2019-02-18
Attending: INTERNAL MEDICINE
Payer: COMMERCIAL

## 2019-02-18 DIAGNOSIS — R10.9 ABDOMINAL CRAMPS: ICD-10-CM

## 2019-02-18 DIAGNOSIS — K92.1 HEMATOCHEZIA: ICD-10-CM

## 2019-02-18 DIAGNOSIS — D64.9 LOW HEMOGLOBIN: ICD-10-CM

## 2019-02-18 DIAGNOSIS — K62.89 PROCTITIS: ICD-10-CM

## 2019-02-18 DIAGNOSIS — L81.8 TATTOO OF SKIN: ICD-10-CM

## 2019-02-18 LAB
HAV IGM SERPL QL IA: NEGATIVE
HBV CORE AB SERPL QL IA: NEGATIVE
HBV CORE IGM SERPL QL IA: NEGATIVE
HBV SURFACE AG SERPL QL IA: NEGATIVE
HCV AB SERPL QL IA: NEGATIVE
HEPATITIS A ANTIBODY, IGG: NEGATIVE
HIV 1+2 AB+HIV1 P24 AG SERPL QL IA: NEGATIVE
TTG IGA SER-ACNC: 4 UNITS

## 2019-02-18 PROCEDURE — 87045 FECES CULTURE AEROBIC BACT: CPT

## 2019-02-18 PROCEDURE — 87046 STOOL CULTR AEROBIC BACT EA: CPT | Mod: 59

## 2019-02-18 PROCEDURE — 87507 IADNA-DNA/RNA PROBE TQ 12-25: CPT

## 2019-02-18 PROCEDURE — 87209 SMEAR COMPLEX STAIN: CPT

## 2019-02-18 PROCEDURE — 87427 SHIGA-LIKE TOXIN AG IA: CPT | Mod: 59

## 2019-02-19 DIAGNOSIS — D64.9 LOW HEMOGLOBIN: ICD-10-CM

## 2019-02-19 DIAGNOSIS — K92.1 HEMATOCHEZIA: Primary | ICD-10-CM

## 2019-02-19 LAB
E COLI SXT1 STL QL IA: NEGATIVE
E COLI SXT2 STL QL IA: NEGATIVE
HBV SURFACE AB SER QL IA: NEGATIVE
HBV SURFACE AB SERPL IA-ACNC: <3 MIU/ML
O+P STL TRI STN: NORMAL

## 2019-02-19 RX ORDER — FERROUS SULFATE 325(65) MG
325 TABLET ORAL EVERY 12 HOURS
Qty: 60 TABLET | Refills: 1 | Status: SHIPPED | OUTPATIENT
Start: 2019-02-19 | End: 2019-06-26

## 2019-02-20 ENCOUNTER — ANESTHESIA (OUTPATIENT)
Dept: ENDOSCOPY | Facility: HOSPITAL | Age: 29
End: 2019-02-20
Payer: COMMERCIAL

## 2019-02-20 ENCOUNTER — ANESTHESIA EVENT (OUTPATIENT)
Dept: ENDOSCOPY | Facility: HOSPITAL | Age: 29
End: 2019-02-20
Payer: COMMERCIAL

## 2019-02-20 ENCOUNTER — HOSPITAL ENCOUNTER (OUTPATIENT)
Facility: HOSPITAL | Age: 29
Discharge: HOME | End: 2019-02-20
Attending: INTERNAL MEDICINE | Admitting: INTERNAL MEDICINE
Payer: COMMERCIAL

## 2019-02-20 VITALS
HEIGHT: 71 IN | DIASTOLIC BLOOD PRESSURE: 61 MMHG | RESPIRATION RATE: 16 BRPM | OXYGEN SATURATION: 100 % | BODY MASS INDEX: 20.3 KG/M2 | WEIGHT: 145 LBS | HEART RATE: 70 BPM | TEMPERATURE: 98 F | SYSTOLIC BLOOD PRESSURE: 116 MMHG

## 2019-02-20 DIAGNOSIS — K92.1 HEMATOCHEZIA: Primary | ICD-10-CM

## 2019-02-20 PROCEDURE — 88305 TISSUE SPECIMEN TO PATHOLOGY - SURGERY: ICD-10-PCS | Mod: 26,,, | Performed by: PATHOLOGY

## 2019-02-20 PROCEDURE — E9220 PRA ENDO ANESTHESIA: HCPCS | Mod: ,,, | Performed by: NURSE ANESTHETIST, CERTIFIED REGISTERED

## 2019-02-20 PROCEDURE — 45380 COLONOSCOPY AND BIOPSY: CPT | Mod: ,,, | Performed by: INTERNAL MEDICINE

## 2019-02-20 PROCEDURE — 37000009 HC ANESTHESIA EA ADD 15 MINS: Performed by: INTERNAL MEDICINE

## 2019-02-20 PROCEDURE — 25000003 PHARM REV CODE 250: Performed by: INTERNAL MEDICINE

## 2019-02-20 PROCEDURE — 45380 COLONOSCOPY AND BIOPSY: CPT | Performed by: INTERNAL MEDICINE

## 2019-02-20 PROCEDURE — E9220 PRA ENDO ANESTHESIA: ICD-10-PCS | Mod: ,,, | Performed by: NURSE ANESTHETIST, CERTIFIED REGISTERED

## 2019-02-20 PROCEDURE — 63600175 PHARM REV CODE 636 W HCPCS: Performed by: NURSE ANESTHETIST, CERTIFIED REGISTERED

## 2019-02-20 PROCEDURE — 88305 TISSUE EXAM BY PATHOLOGIST: CPT | Performed by: PATHOLOGY

## 2019-02-20 PROCEDURE — 27201012 HC FORCEPS, HOT/COLD, DISP: Performed by: INTERNAL MEDICINE

## 2019-02-20 PROCEDURE — 37000008 HC ANESTHESIA 1ST 15 MINUTES: Performed by: INTERNAL MEDICINE

## 2019-02-20 PROCEDURE — 88305 TISSUE EXAM BY PATHOLOGIST: CPT | Mod: 26,,, | Performed by: PATHOLOGY

## 2019-02-20 PROCEDURE — 45380 PR COLONOSCOPY,BIOPSY: ICD-10-PCS | Mod: ,,, | Performed by: INTERNAL MEDICINE

## 2019-02-20 RX ORDER — PROPOFOL 10 MG/ML
VIAL (ML) INTRAVENOUS CONTINUOUS PRN
Status: DISCONTINUED | OUTPATIENT
Start: 2019-02-20 | End: 2019-02-20

## 2019-02-20 RX ORDER — PROPOFOL 10 MG/ML
VIAL (ML) INTRAVENOUS
Status: DISCONTINUED | OUTPATIENT
Start: 2019-02-20 | End: 2019-02-20

## 2019-02-20 RX ORDER — SODIUM CHLORIDE 0.9 % (FLUSH) 0.9 %
3 SYRINGE (ML) INJECTION
Status: DISCONTINUED | OUTPATIENT
Start: 2019-02-20 | End: 2019-02-20 | Stop reason: HOSPADM

## 2019-02-20 RX ORDER — SODIUM CHLORIDE 9 MG/ML
INJECTION, SOLUTION INTRAVENOUS CONTINUOUS
Status: DISCONTINUED | OUTPATIENT
Start: 2019-02-20 | End: 2019-02-20 | Stop reason: HOSPADM

## 2019-02-20 RX ORDER — LIDOCAINE HCL/PF 100 MG/5ML
SYRINGE (ML) INTRAVENOUS
Status: DISCONTINUED | OUTPATIENT
Start: 2019-02-20 | End: 2019-02-20

## 2019-02-20 RX ADMIN — PROPOFOL 100 MG: 10 INJECTION, EMULSION INTRAVENOUS at 08:02

## 2019-02-20 RX ADMIN — SODIUM CHLORIDE: 0.9 INJECTION, SOLUTION INTRAVENOUS at 07:02

## 2019-02-20 RX ADMIN — PROPOFOL 200 MCG/KG/MIN: 10 INJECTION, EMULSION INTRAVENOUS at 08:02

## 2019-02-20 RX ADMIN — LIDOCAINE HYDROCHLORIDE 50 MG: 20 INJECTION, SOLUTION INTRAVENOUS at 08:02

## 2019-02-20 NOTE — [employer] PATIENT INSTRUCTIONS
Discharge Summary/Instructions after an Endoscopic Procedure  Patient Name: Adi Coleman  Patient MRN: [id number]  Patient YOB: 1990 Wednesday, February 20, 2019  Ramin Davis MD  RESTRICTIONS:  During your procedure today, you received medications for sedation.  These   medications may affect your judgment, balance and coordination.  Therefore,   for 24 hours, you have the following restrictions:   - DO NOT drive a car, operate machinery, make legal/financial decisions,   sign important papers or drink alcohol.    ACTIVITY:  Today: no heavy lifting, straining or running due to procedural   sedation/anesthesia.  The following day: return to full activity including work.  DIET:  Eat and drink normally unless instructed otherwise.     TREATMENT FOR COMMON SIDE EFFECTS:  - Mild abdominal pain, nausea, belching, bloating or excessive gas:  rest,   eat lightly and use a heating pad.  - Sore Throat: treat with throat lozenges and/or gargle with warm salt   water.  - Because air was used during the procedure, expelling large amounts of air   from your rectum or belching is normal.  - If a bowel prep was taken, you may not have a bowel movement for 1-3 days.    This is normal.  SYMPTOMS TO WATCH FOR AND REPORT TO YOUR PHYSICIAN:  1. Abdominal pain or bloating, other than gas cramps.  2. Chest pain.  3. Back pain.  4. Signs of infection such as: chills or fever occurring within 24 hours   after the procedure.  5. Rectal bleeding, which would show as bright red, maroon, or black stools.   (A tablespoon of blood from the rectum is not serious, especially if   hemorrhoids are present.)  6. Vomiting.  7. Weakness or dizziness.  GO DIRECTLY TO THE NEAREST EMERGENCY ROOM IF YOU HAVE ANY OF THE FOLLOWING:      Difficulty breathing              Chills and/or fever over 101 F   Persistent vomiting and/or vomiting blood   Severe abdominal pain   Severe chest pain   Black, tarry stools   Bleeding- more than one tablespoon   Any  other symptom or condition that you feel may need urgent attention  Your doctor recommends these additional instructions:  If any biopsies were taken, your doctors clinic will contact you in 1 to 2   weeks with any results.  - Patient has a contact number available for emergencies.  The signs and   symptoms of potential delayed complications were discussed with the   patient.  Return to normal activities tomorrow.  Written discharge   instructions were provided to the patient.   - Discharge patient to home.   - Await pathology results.   - Continue canasa x 8 weeks total  - Repeat colonoscopy in 5 years for surveillance based on pathology results   (initial diagnosis 2-3 years ago).   - The findings and recommendations were discussed with the designated   responsible adult.   For questions, problems or results please call your physician - Ramin Davis MD at Work:  [phone].  [facility], EMERGENCY ROOM PHONE NUMBER: [phone]  IF A COMPLICATION OR EMERGENCY SITUATION ARISES AND YOU ARE UNABLE TO REACH   YOUR PHYSICIAN - GO DIRECTLY TO THE EMERGENCY ROOM.  Ramin Davis MD  2/20/2019 8:25:04 AM  This report has been verified and signed electronically.  [employer]

## 2019-02-20 NOTE — ANESTHESIA POSTPROCEDURE EVALUATION
"Anesthesia Post Evaluation    Patient: Adi Coleman III    Procedure(s) Performed: Procedure(s) (LRB):  COLONOSCOPY (N/A)    Final Anesthesia Type: general  Patient location during evaluation: PACU  Patient participation: Yes- Able to Participate  Level of consciousness: awake and alert  Post-procedure vital signs: reviewed and stable  Pain management: adequate  Airway patency: patent  PONV status at discharge: No PONV  Anesthetic complications: no      Cardiovascular status: blood pressure returned to baseline  Respiratory status: unassisted  Hydration status: euvolemic  Follow-up not needed.        Visit Vitals  /61   Pulse 70   Temp 36.8 °C (98.2 °F) (Oral)   Resp 16   Ht 5' 11" (1.803 m)   Wt 65.8 kg (145 lb)   SpO2 100%   BMI 20.22 kg/m²       Pain/Carmen Score: Carmen Score: 10 (2/20/2019  8:42 AM)        "

## 2019-02-20 NOTE — INTERVAL H&P NOTE
The patient has been examined and the H&P has been reviewed:    I concur with the findings and no changes have occurred since H&P was written.     History and Exam unchanged from visit.    Procedure - Colonoscopy  Neck - supple  Plan of anesthesia - General  ASA - per anesthesia  Mallampati - per anesthesia  Anesthesia problems - no  Family history of anesthesia problems - no      Anesthesia/Surgery risks, benefits and alternative options discussed and understood by patient/family.          Active Hospital Problems    Diagnosis  POA    Hematochezia [K92.1]  Yes      Resolved Hospital Problems   No resolved problems to display.

## 2019-02-20 NOTE — ANESTHESIA PREPROCEDURE EVALUATION
02/20/2019  Adi Coleman III is a 28 y.o., male.  Past Medical History:   Diagnosis Date    Asthma     Depression      Past Surgical History:   Procedure Laterality Date    COLONOSCOPY N/A 3/8/2017    Performed by Max Cullen Jr., MD at [facility] ENDO    MOUTH SURGERY      TONSILLECTOMY             Anesthesia Evaluation    I have reviewed the Patient Summary Reports.     I have reviewed the Medications.     Review of Systems  Anesthesia Hx:  No problems with previous Anesthesia    Social:  Former Smoker, Social Alcohol Use    Hematology/Oncology:  Hematology Normal   Oncology Normal     EENT/Dental:EENT/Dental Normal   Cardiovascular:  Cardiovascular Normal     Renal/:  Renal/ Normal     Musculoskeletal:  Musculoskeletal Normal    Neurological:  Neurology Normal    Endocrine:  Endocrine Normal    Dermatological:  Skin Normal    Psych:  Psychiatric Normal           Physical Exam  General:  Well nourished    Airway/Jaw/Neck:  Airway Findings: Mouth Opening: Normal Tongue: Normal  General Airway Assessment: Adult  Mallampati: I  TM Distance: Normal, at least 6 cm        Eyes/Ears/Nose:  EYES/EARS/NOSE FINDINGS: Normal    Chest/Lungs:  Chest/Lungs Clear    Heart/Vascular:  Heart Findings: Rate: Normal  Rhythm: Regular Rhythm  Sounds: Normal  Heart murmur: negative Vascular Findings: Normal    Abdomen:  Abdomen Findings: Normal    Musculoskeletal:  Musculoskeletal Findings: Normal   Skin:  Skin Findings: Normal    Mental Status:  Mental Status Findings:  Alert and Oriented, Cooperative         Anesthesia Plan  Type of Anesthesia, risks & benefits discussed:  Anesthesia Type:  general  Patient's Preference: general  Intra-op Monitoring Plan: standard ASA monitors  Intra-op Monitoring Plan Comments:   Post Op Pain Control Plan:   Post Op Pain Control Plan Comments:   Induction:   IV  Beta Blocker:   Patient is not currently on a Beta-Blocker (No further documentation required).       Informed Consent: Patient understands risks and agrees with Anesthesia plan.  Questions answered. Anesthesia consent signed with patient.  ASA Score: 1     Day of Surgery Review of History & Physical: I have interviewed and examined the patient. I have reviewed the patient's H&P dated:  There are no significant changes.  H&P update referred to the surgeon.         Ready For Surgery From Anesthesia Perspective.

## 2019-02-20 NOTE — TRANSFER OF CARE
"Anesthesia Transfer of Care Note    Patient: Adi Coleman III    Procedure(s) Performed: Procedure(s) (LRB):  COLONOSCOPY (N/A)    Patient location: PACU    Anesthesia Type: general    Transport from OR: Transported from OR on room air with adequate spontaneous ventilation    Post pain: adequate analgesia    Post assessment: no apparent anesthetic complications    Post vital signs: stable    Level of consciousness: awake    Nausea/Vomiting: no nausea/vomiting    Complications: none    Transfer of care protocol was followed      Last vitals:   Visit Vitals  BP (!) 110/56 (BP Location: Left arm, Patient Position: Lying)   Pulse 68   Temp 36.8 °C (98.2 °F) (Oral)   Resp 14   Ht 5' 11" (1.803 m)   Wt 65.8 kg (145 lb)   SpO2 99%   BMI 20.22 kg/m²     "

## 2019-02-20 NOTE — DISCHARGE INSTRUCTIONS
Colonoscopy     A camera attached to a flexible tube with a viewing lens is used to take video pictures.     Colonoscopy is a test to view the inside of your lower digestive tract (colon and rectum). Sometimes it can show the last part of the small intestine (ileum). During the test, small pieces of tissue may be removed for testing. This is called a biopsy. Small growths, such as polyps, may also be removed.   Why is colonoscopy done?  The test is done to help look for colon cancer. And it can help find the source of abdominal pain, bleeding, and changes in bowel habits. It may be needed once a year, depending on factors such as your:  · Age  · Health history  · Family health history  · Symptoms  · Results from any prior colonoscopy  Risks and possible complications  These include:  · Bleeding               · A puncture or tear in the colon   · Risks of anesthesia  · A cancer lesion not being seen  Getting ready   To prepare for the test:  · Talk with your healthcare provider about the risks of the test (see below). Also ask your healthcare provider about alternatives to the test.  · Tell your healthcare provider about any medicines you take. Also tell him or her about any health conditions you may have.  · Make sure your rectum and colon are empty for the test. Follow the diet and bowel prep instructions exactly. If you dont, the test may need to be rescheduled.  · Plan for a friend or family member to drive you home after the test.     Colonoscopy provides an inside view of the entire colon.     You may discuss the results with your doctor right away or at a future visit.  During the test   The test is usually done in the hospital on an outpatient basis. This means you go home the same day. The procedure takes about 30 minutes. During that time:  · You are given relaxing (sedating) medicine through an IV line. You may be drowsy, or fully asleep.  · The healthcare provider will first give you a physical exam to  check for anal and rectal problems.  · Then the anus is lubricated and the scope inserted.  · If you are awake, you may have a feeling similar to needing to have a bowel movement. You may also feel pressure as air is pumped into the colon. Its OK to pass gas during the procedure.  · Biopsy, polyp removal, or other treatments may be done during the test.  After the test   You may have gas right after the test. It can help to try to pass it to help prevent later bloating. Your healthcare provider may discuss the results with you right away. Or you may need to schedule a follow-up visit to talk about the results. After the test, you can go back to your normal eating and other activities. You may be tired from the sedation and need to rest for a few hours.  Date Last Reviewed: 11/1/2016 © 2000-2017 [employer]. [address] [place] [address], [place] [address]. All rights reserved. This information is not intended as a substitute for professional medical care. Always follow your healthcare professional's instructions.

## 2019-02-21 LAB — BACTERIA STL CULT: NORMAL

## 2019-02-22 ENCOUNTER — TELEPHONE (OUTPATIENT)
Dept: GASTROENTEROLOGY | Facility: CLINIC | Age: 29
End: 2019-02-22

## 2019-02-22 NOTE — TELEPHONE ENCOUNTER
----- Message from Jey Frausto MD sent at 2/19/2019  1:24 PM CST -----  Lindy - please tell Adi that his stool studies so far look good

## 2019-02-22 NOTE — TELEPHONE ENCOUNTER
Pt informed of test results. Lab appt scheduled and mailed. Pt dose not want to get Hepatitis A and B vaccines.

## 2019-02-22 NOTE — TELEPHONE ENCOUNTER
"----- Message from Jey Frausto MD sent at 2/19/2019  7:55 AM CST -----  Lindy  please tell patient that their Hepatitis A, B and C labs are negative but they have "No" immunity to them either.      There is currently No vaccination yet for Hepatitis C.    There is vaccinations for Hepatitis A and B,  and Recommend the Hepatitis A and B vaccination series.        Its a three part vaccination series:   Day 1, then again in 1 month, and then again in 6 months from first one.      Orders were  Placed.    Lindy - please tell patient that they are slightly anaemic and recommend that they take ferrous sulfate one 325mg pill every 12 hours for next 2 months and repeat fasting hemoglobin and Ferritin in 8 weeks - Orders placed.  "

## 2019-02-27 ENCOUNTER — TELEPHONE (OUTPATIENT)
Dept: GASTROENTEROLOGY | Facility: CLINIC | Age: 29
End: 2019-02-27

## 2019-02-27 ENCOUNTER — TELEPHONE (OUTPATIENT)
Dept: ENDOSCOPY | Facility: HOSPITAL | Age: 29
End: 2019-02-27

## 2019-02-27 NOTE — TELEPHONE ENCOUNTER
Ma contact pt to inform him per Dr. Davis message below:    Please let him know his colon biopsies just confirm ulcerative colitis in her rectum. Continue taking the canasa suppositories and he can follow with dr lara in clinic    Pt didn't answer left detail message to return call       ----- Message from Ramin Davis MD sent at 2/27/2019  2:40 PM CST -----  Please let him know his colon biopsies just confirm ulcerative colitis in her rectum. Continue taking the canasa suppositories and he can follow with dr lara in clinic

## 2019-02-28 ENCOUNTER — TELEPHONE (OUTPATIENT)
Dept: GASTROENTEROLOGY | Facility: CLINIC | Age: 29
End: 2019-02-28

## 2019-02-28 NOTE — TELEPHONE ENCOUNTER
Ma spoke to pt informed pt per Dr. Davis message below:    Please let him know his colon biopsies just confirm ulcerative colitis in her rectum. Continue taking the canasa suppositories and he can follow with dr frausto in clinic    Pt would like for Dr. Frausto assistant to call him to schedule appointment. Message was given to Dr. Frausto ass.      Pt verbalize understanding and thank Ma

## 2019-04-05 ENCOUNTER — TELEPHONE (OUTPATIENT)
Dept: GASTROENTEROLOGY | Facility: CLINIC | Age: 29
End: 2019-04-05

## 2019-04-05 DIAGNOSIS — R10.9 ABDOMINAL CRAMPS: ICD-10-CM

## 2019-04-05 DIAGNOSIS — K92.1 HEMATOCHEZIA: ICD-10-CM

## 2019-04-05 DIAGNOSIS — D64.9 LOW HEMOGLOBIN: ICD-10-CM

## 2019-04-05 DIAGNOSIS — L81.8 TATTOO OF SKIN: ICD-10-CM

## 2019-04-05 DIAGNOSIS — K62.89 PROCTITIS: ICD-10-CM

## 2019-04-05 RX ORDER — MESALAMINE 1000 MG/1
1000 SUPPOSITORY RECTAL NIGHTLY
Qty: 30 SUPPOSITORY | Refills: 5 | Status: SHIPPED | OUTPATIENT
Start: 2019-04-05 | End: 2019-06-26

## 2019-04-05 NOTE — TELEPHONE ENCOUNTER
----- Message from Estrella Vazquez sent at 2019  1:47 PM CDT -----  Contact: self  Pt is requesting a refill on mesalamine (CANASA) 1000 MG Supp (). Pt is using [facility] [address] [place] [address] - [address] AT [facility] [address].    Pt can be reached at [phone].    Thank you

## 2019-04-20 ENCOUNTER — LAB VISIT (OUTPATIENT)
Dept: LAB | Facility: HOSPITAL | Age: 29
End: 2019-04-20
Attending: INTERNAL MEDICINE
Payer: COMMERCIAL

## 2019-04-20 DIAGNOSIS — K92.1 HEMATOCHEZIA: ICD-10-CM

## 2019-04-20 DIAGNOSIS — D64.9 LOW HEMOGLOBIN: ICD-10-CM

## 2019-04-20 LAB
FERRITIN SERPL-MCNC: 168 NG/ML (ref 20–300)
HGB BLD-MCNC: 14.8 G/DL (ref 14–18)
IRON SERPL-MCNC: 82 UG/DL (ref 45–160)
SATURATED IRON: 26 % (ref 20–50)
TOTAL IRON BINDING CAPACITY: 314 UG/DL (ref 250–450)
TRANSFERRIN SERPL-MCNC: 212 MG/DL (ref 200–375)

## 2019-04-20 PROCEDURE — 83540 ASSAY OF IRON: CPT

## 2019-04-20 PROCEDURE — 85018 HEMOGLOBIN: CPT

## 2019-04-20 PROCEDURE — 82728 ASSAY OF FERRITIN: CPT

## 2019-04-20 PROCEDURE — 36415 COLL VENOUS BLD VENIPUNCTURE: CPT

## 2019-04-23 ENCOUNTER — TELEPHONE (OUTPATIENT)
Dept: GASTROENTEROLOGY | Facility: CLINIC | Age: 29
End: 2019-04-23

## 2019-04-23 NOTE — TELEPHONE ENCOUNTER
----- Message from Tonny Joyner MD sent at 4/22/2019  8:24 AM CDT -----  Dr. Jett Smalls sent me this result note, but I was not involved on the case. He asked me to forward it to you instead. Let me know if you need me to take care of anything.    Thanks,  Brenden   ----- Message -----  From: Jey Frausto MD  Sent: 4/21/2019  11:56 AM  To: MD Dr. Ar Dominguez please tell Adi his labs look good and no anemia.  Please ask how he is doing with his current meds.

## 2019-06-26 ENCOUNTER — HOSPITAL ENCOUNTER (EMERGENCY)
Facility: HOSPITAL | Age: 29
Discharge: HOME | End: 2019-06-26
Attending: EMERGENCY MEDICINE
Payer: COMMERCIAL

## 2019-06-26 VITALS
OXYGEN SATURATION: 100 % | DIASTOLIC BLOOD PRESSURE: 77 MMHG | RESPIRATION RATE: 18 BRPM | SYSTOLIC BLOOD PRESSURE: 125 MMHG | WEIGHT: 150 LBS | HEART RATE: 87 BPM | HEIGHT: 71 IN | BODY MASS INDEX: 21 KG/M2 | TEMPERATURE: 98 F

## 2019-06-26 DIAGNOSIS — R45.1 RESTLESSNESS: ICD-10-CM

## 2019-06-26 DIAGNOSIS — R52 GENERALIZED BODY ACHES: Primary | ICD-10-CM

## 2019-06-26 DIAGNOSIS — F11.93 OPIOID WITHDRAWAL: ICD-10-CM

## 2019-06-26 LAB
INFLUENZA A, MOLECULAR: NEGATIVE
INFLUENZA B, MOLECULAR: NEGATIVE
SPECIMEN SOURCE: NORMAL

## 2019-06-26 PROCEDURE — 87502 INFLUENZA DNA AMP PROBE: CPT

## 2019-06-26 PROCEDURE — 99284 EMERGENCY DEPT VISIT MOD MDM: CPT

## 2019-06-26 PROCEDURE — 25000003 PHARM REV CODE 250: Performed by: PHYSICIAN ASSISTANT

## 2019-06-26 RX ORDER — ONDANSETRON 4 MG/1
4 TABLET, ORALLY DISINTEGRATING ORAL
Status: COMPLETED | OUTPATIENT
Start: 2019-06-26 | End: 2019-06-26

## 2019-06-26 RX ORDER — IBUPROFEN 600 MG/1
600 TABLET ORAL
Status: COMPLETED | OUTPATIENT
Start: 2019-06-26 | End: 2019-06-26

## 2019-06-26 RX ORDER — HYDROXYZINE HYDROCHLORIDE 25 MG/1
50 TABLET, FILM COATED ORAL EVERY 6 HOURS PRN
Qty: 20 TABLET | Refills: 0 | Status: SHIPPED | OUTPATIENT
Start: 2019-06-26 | End: 2020-11-12

## 2019-06-26 RX ORDER — IBUPROFEN 600 MG/1
600 TABLET ORAL EVERY 8 HOURS PRN
Qty: 15 TABLET | Refills: 0 | Status: SHIPPED | OUTPATIENT
Start: 2019-06-26 | End: 2019-09-06

## 2019-06-26 RX ORDER — ONDANSETRON 4 MG/1
4 TABLET, FILM COATED ORAL EVERY 8 HOURS PRN
Qty: 12 TABLET | Refills: 0 | Status: SHIPPED | OUTPATIENT
Start: 2019-06-26 | End: 2020-11-12

## 2019-06-26 RX ADMIN — ONDANSETRON 4 MG: 4 TABLET, ORALLY DISINTEGRATING ORAL at 01:06

## 2019-06-26 RX ADMIN — IBUPROFEN 600 MG: 600 TABLET, FILM COATED ORAL at 01:06

## 2019-06-26 NOTE — ED TRIAGE NOTES
Pt presents to ED with C/O 7/10 at this time. Pt states generalized body aches. Pt denies any N/V/D but dies states pain to the back of his head. Pt denies taking any medication for pain but does states he toke melatonin to help him rest.

## 2019-06-26 NOTE — ED PROVIDER NOTES
Encounter Date: 6/26/2019       History     Chief Complaint   Patient presents with    Generalized Body Aches     c/o body aches and chills x2 days, worse at night.     29 y/o male with history of asthma and depression presents to the ER with chief complaint of body aches for 2 days.  Patient reports sinus drip since last night, but denies cough, chest pain, shortness of breath, sore throat. He reports a restless sensation in his arms and legs and says he feels irritable and anxious.  He reports mild occipital headache, he denies neck stiffness, fever, chills, nausea or vomiting.  He reports decreased appetite and generalized abdominal cramping.  He denies current abdominal pain or recent diarrhea.      Patient does admit that he is trying to stop his pain pills and that his last dose of hydrocodone was 3 days ago.  He was previously taking 10-12 hydrocodone per day.  He reports having less severe, but similar symptoms with opioid withdrawal in the past.   He reports that he took Kratom (stimulant to treat opioid withdrawal) over-the-counter to help reduce his symptoms, but his last dose of this medication was yesterday.  He denies suicidal homicidal ideation, or additional complaints at this time.        Review of patient's allergies indicates:  No Known Allergies  Past Medical History:   Diagnosis Date    Asthma     Depression      Past Surgical History:   Procedure Laterality Date    COLONOSCOPY N/A 2/20/2019    Performed by [facility] at [facility] (4TH FLR)    COLONOSCOPY N/A 3/8/2017    Performed by Max Cullen Jr., [facility] at [facility]    MOUTH SURGERY      TONSILLECTOMY       Family History   Problem Relation Age of Onset    Celiac disease Neg Hx     Cirrhosis Neg Hx     Colon cancer Neg Hx     Colon polyps Neg Hx     Crohn's disease Neg Hx     Esophageal cancer Neg Hx     Inflammatory bowel disease Neg Hx     Liver cancer Neg Hx     Liver disease Neg Hx     Rectal cancer Neg Hx      Stomach cancer Neg Hx     Ulcerative colitis Neg Hx      Social History     Tobacco Use    Smoking status: Former Smoker    Smokeless tobacco: Never Used    Tobacco comment: quit 7 yrs ago   Substance Use Topics    Alcohol use: Yes     Comment: rarely    Drug use: No     Review of Systems   Constitutional: Positive for appetite change. Negative for chills and fever.   HENT: Negative for sore throat and trouble swallowing.    Eyes: Negative for visual disturbance.   Respiratory: Negative for shortness of breath.    Cardiovascular: Negative for chest pain and palpitations.   Gastrointestinal: Negative for nausea and vomiting.   Genitourinary: Negative for dysuria.   Musculoskeletal: Positive for myalgias and neck pain. Negative for back pain and neck stiffness.   Skin: Negative for rash.   Neurological: Negative for dizziness, syncope, weakness and light-headedness.   Hematological: Does not bruise/bleed easily.   Psychiatric/Behavioral: Negative for confusion, hallucinations, self-injury and suicidal ideas. The patient is nervous/anxious.        Physical Exam     Initial Vitals [06/26/19 1210]   BP Pulse Resp Temp SpO2   125/77 87 18 97.9 °F (36.6 °C) 100 %      MAP       --         Physical Exam    Nursing note and vitals reviewed.  Constitutional: He appears well-developed and well-nourished.   HENT:   Head: Atraumatic.   Nose: No sinus tenderness.   Mouth/Throat: Oropharynx is clear and moist and mucous membranes are normal. No trismus in the jaw. No uvula swelling. No oropharyngeal exudate, posterior oropharyngeal edema, posterior oropharyngeal erythema or tonsillar abscesses.   Eyes: Conjunctivae and EOM are normal. Pupils are equal, round, and reactive to light.   Neck: Normal range of motion. Neck supple.   Cardiovascular: Normal rate and regular rhythm.   Pulmonary/Chest: Breath sounds normal. No respiratory distress. He has no wheezes. He has no rhonchi. He has no rales.   Abdominal: Soft. Bowel sounds  are normal. There is no tenderness.   Neurological: He is alert and oriented to person, place, and time.   Skin: No rash noted.   Psychiatric: He has a normal mood and affect.         ED Course   Procedures  Labs Reviewed   INFLUENZA A & B BY MOLECULAR          Imaging Results    None                APC / Resident Notes:   Patient presents to the ER due to full body aches, restlessness, anxiety, intermittent abdominal cramping x2 days.  Patient's symptoms and recent history are most consistent with opioid withdrawal.  Patient is afebrile with stable vital signs, he is nontoxic appearing and appears comfortable.  He does appear somewhat anxious.  The patient drove himself to the ED.  Flu swab is negative and patient does not have any evidence of infection on exam.  Patient says he was taking up to 12 hydrocodone per day.  Based on 325 mg acetaminophen per hydrocodone tablet, he does not exceed 4 g of Tylenol per day, but I have counseled him on the risk of Tylenol toxicity. He has no abdominal pain , abdominal tenderness or jaundice.    I have provided the patient with drug treatment resource contact numbers.  I will treat symptomatically with hydroxyzine, Motrin and Zofran and advised close follow-up with primary care physician.  Patient is comfortable with this plan.  He is given ER return precautions.  I discussed the care this patient my supervising physician.                 Clinical Impression:       ICD-10-CM ICD-9-CM   1. Generalized body aches R52 780.96   2. Restlessness R45.1 799.29   3. Opioid withdrawal F11.23 292.0     304.00                                OTONIEL Huertas  06/26/19 2695

## 2019-09-05 ENCOUNTER — NURSE TRIAGE (OUTPATIENT)
Dept: ADMINISTRATIVE | Facility: CLINIC | Age: 29
End: 2019-09-05

## 2019-09-05 DIAGNOSIS — K62.89 PROCTITIS: ICD-10-CM

## 2019-09-05 DIAGNOSIS — D64.9 LOW HEMOGLOBIN: ICD-10-CM

## 2019-09-05 DIAGNOSIS — K92.1 HEMATOCHEZIA: ICD-10-CM

## 2019-09-05 DIAGNOSIS — R10.9 ABDOMINAL CRAMPS: ICD-10-CM

## 2019-09-05 DIAGNOSIS — L81.8 TATTOO OF SKIN: ICD-10-CM

## 2019-09-05 RX ORDER — MESALAMINE 1000 MG/1
1000 SUPPOSITORY RECTAL NIGHTLY
Qty: 5 SUPPOSITORY | Refills: 0 | Status: SHIPPED | OUTPATIENT
Start: 2019-09-05 | End: 2019-09-06 | Stop reason: SDUPTHER

## 2019-09-06 ENCOUNTER — OFFICE VISIT (OUTPATIENT)
Dept: GASTROENTEROLOGY | Facility: CLINIC | Age: 29
End: 2019-09-06
Payer: COMMERCIAL

## 2019-09-06 ENCOUNTER — TELEPHONE (OUTPATIENT)
Dept: GASTROENTEROLOGY | Facility: CLINIC | Age: 29
End: 2019-09-06

## 2019-09-06 VITALS
HEART RATE: 90 BPM | WEIGHT: 156.31 LBS | DIASTOLIC BLOOD PRESSURE: 62 MMHG | SYSTOLIC BLOOD PRESSURE: 122 MMHG | BODY MASS INDEX: 21.88 KG/M2 | HEIGHT: 71 IN | RESPIRATION RATE: 16 BRPM

## 2019-09-06 DIAGNOSIS — L81.8 TATTOO OF SKIN: ICD-10-CM

## 2019-09-06 DIAGNOSIS — K59.09 CONSTIPATION, CHRONIC: ICD-10-CM

## 2019-09-06 DIAGNOSIS — K62.89 PROCTITIS: Primary | ICD-10-CM

## 2019-09-06 DIAGNOSIS — R10.9 ABDOMINAL CRAMPS: ICD-10-CM

## 2019-09-06 DIAGNOSIS — K92.1 HEMATOCHEZIA: ICD-10-CM

## 2019-09-06 DIAGNOSIS — D64.9 LOW HEMOGLOBIN: ICD-10-CM

## 2019-09-06 PROCEDURE — 99999 PR PBB SHADOW E&M-EST. PATIENT-LVL IV: ICD-10-PCS | Mod: PBBFAC,,, | Performed by: INTERNAL MEDICINE

## 2019-09-06 PROCEDURE — 99214 OFFICE O/P EST MOD 30 MIN: CPT | Mod: S$GLB,,, | Performed by: INTERNAL MEDICINE

## 2019-09-06 PROCEDURE — 99999 PR PBB SHADOW E&M-EST. PATIENT-LVL IV: CPT | Mod: PBBFAC,,, | Performed by: INTERNAL MEDICINE

## 2019-09-06 PROCEDURE — 99214 PR OFFICE/OUTPT VISIT, EST, LEVL IV, 30-39 MIN: ICD-10-PCS | Mod: S$GLB,,, | Performed by: INTERNAL MEDICINE

## 2019-09-06 PROCEDURE — 3008F BODY MASS INDEX DOCD: CPT | Mod: CPTII,S$GLB,, | Performed by: INTERNAL MEDICINE

## 2019-09-06 PROCEDURE — 3008F PR BODY MASS INDEX (BMI) DOCUMENTED: ICD-10-PCS | Mod: CPTII,S$GLB,, | Performed by: INTERNAL MEDICINE

## 2019-09-06 RX ORDER — MESALAMINE 1000 MG/1
1000 SUPPOSITORY RECTAL NIGHTLY
Qty: 30 SUPPOSITORY | Refills: 11 | Status: SHIPPED | OUTPATIENT
Start: 2019-09-06 | End: 2020-02-07 | Stop reason: SDUPTHER

## 2019-09-06 NOTE — TELEPHONE ENCOUNTER
Called and spoke to pt.  Pt is scheduled for labs on 09/09 and Hep A & Hep B vaccines on 09/09 @ 11:30a.  Pt accepted appts.

## 2019-09-06 NOTE — TELEPHONE ENCOUNTER
----- Message from Jey Frausto MD sent at 9/6/2019  1:02 PM CDT -----  Return to GI clinic in 3 months for follow-up of proctitis    Please order lab work Monday across the street as well as start hepatitis a and B vaccinations series 3 parts

## 2019-09-06 NOTE — PATIENT INSTRUCTIONS
Metamucil one tablespoon in 12 ounces of water before breakfast and before dinner. Over the counter medicine.    Take Miralax 17 grams in 12 ounces of water once night when constipated. Over the counter medicine.    If stools are very hard take one colace 100 mg pill over the counter once daily as a stool softer. Over the counter medicine.    RTC in 3 months for follow up.

## 2019-09-06 NOTE — TELEPHONE ENCOUNTER
----- Message from Lulu Lawson sent at 9/6/2019  1:43 PM CDT -----  Contact: Yelena [id number]  Requested to have a call returned to Yelena in regards to patient labs  Contact:leslee Kirk,[phone]

## 2019-09-06 NOTE — TELEPHONE ENCOUNTER
"Patient called to report the following:     -seen in clinic and dx with Chohn's   -blood in stool the last few days   -needs refill on mesalamine  -5 suppositories called in  -advised pt to f/u in clinic for additional refills   -states it's hard to get appointment   -advised to report to ED for immediate medical attention     Reason for Disposition   Caller has NON-URGENT medication question about med that PCP prescribed and triager unable to answer question    Additional Information   Negative: Drug overdose and nurse unable to answer question   Negative: Caller requesting information not related to medicine   Negative: Caller requesting a prescription for Strep throat and has a positive culture result   Negative: Rash while taking a medication or within 3 days of stopping it   Negative: Immunization reaction suspected   Negative: [1] Asthma AND [2] having symptoms of asthma (cough, wheezing, etc)   Negative: MORE THAN A DOUBLE DOSE of a prescription or over-the-counter (OTC) drug   Negative: [1] DOUBLE DOSE (an extra dose or lesser amount) of over-the-counter (OTC) drug AND [2] any symptoms (e.g., dizziness, nausea, pain, sleepiness)   Negative: [1] DOUBLE DOSE (an extra dose or lesser amount) of prescription drug AND [2] any symptoms (e.g., dizziness, nausea, pain, sleepiness)   Negative: Took another person's prescription drug   Negative: [1] DOUBLE DOSE (an extra dose or lesser amount) of prescription drug AND [2] NO symptoms (Exception: a double dose of antibiotics)   Negative: Diabetes drug error or overdose (e.g., insulin or extra dose)   Negative: [1] Request for URGENT new prescription or refill of "essential" medication (i.e., likelihood of harm to patient if not taken) AND [2] triager unable to fill per unit policy   Negative: [1] Prescription not at pharmacy AND [2] was prescribed today by PCP   Negative: Pharmacy calling with prescription questions and triager unable to answer " question   Negative: Caller has urgent medication question about med that PCP prescribed and triager unable to answer question    Protocols used: MEDICATION QUESTION CALL-A-AH

## 2019-09-06 NOTE — PROGRESS NOTES
CHIEF COMPLAINT:   follow-up for ulcer proctitis     HISTORY OF PRESENT ILLNESS:  This is a 28-year-old white male whose had a history of  Ulcerative proctitis.  He has had 2 colonoscopies for just distal rectum mild inflammation.  No dysplasia.  He does really well for Canasa suppositories when he uses them.  He does occasionally use  Narcotic pain meds which constipate him and that is when he starts having a lot of constipation  And straining to have a bowel movement then start seen little bit of blood.  He has had 2 colonoscopies for evaluation of this  He does not do a lot of vegetables he does not do any over-the-counter fiber like Metamucil.  We recommend he do Metamucil 1 tbsp in 12 oz water before breakfast and before dinner 1 Colace pill a day if his stools are hard and hard to pass.  And also use MiraLax 17 g in 12 oz water for dinner as needed for constipation. He says when he uses Canasa suppository after 2 or 3 days  He has no more blood when he wipes.  He has never had GC her Chlamydia rectal swab and he is here today for it.  His HIV is negative RPR is negative hep C is negative hep a and B are negative he is not a immunity to hepatitis a and B we have ordered vaccinations.  Patient denies any abdominal pain no weight loss no fever chills no nausea vomiting no chest pain no shortness of breath no dysuria urgency frequency no discharge.  No history of any trauma. No sore throat no new partners.    REVIEW OF SYSTEMS:  CONSTITUTIONAL:  No fever, fatigue or weight loss.  EYES:  No visual disturbances.  ENT:  No difficulty swallowing or sore throat.  CARDIOVASCULAR:  No chest pain or palpitations.  RESPIRATORY:  No shortness of breath or cough.  GENITOURINARY:  No dysuria, urgency, frequency or hematuria.  MUSCULOSKELETAL:  He denies any arthritis.  SKIN:  No itching or rash.  He does have multiple tattoos, over 30 he says.  NEUROLOGIC:  No headache, syncope or stroke.  PSYCHIATRIC:  No uncontrolled  "depression or anxiety.  ENDOCRINE:  No cold or heat intolerance.  LYMPHATICS:  No lymphadenopathy.  ALLERGIES:  No known drug allergies.  GASTROINTESTINAL:  No nausea or vomiting.  No heartburn.  He does have bilateral   lower abdominal cramps, improved with bowel movements.  No early satiety.  No   constipation, no diarrhea.  He has been having inés hematochezia, painless, for   the last three weeks.     RISK FACTORS FOR LIVER DISEASE:  He has a professional tattoo as well as several   homemade tattoos.  No IV drugs.  No nasal drug use.  No blood transfusion ever.     PAST MEDICAL HISTORY:  Negative for diabetes, heart disease, lung disease, renal   disease, hypertension, hyperlipidemia or jaundice.  Does have a history of some   proctitis in the past.     PAST SURGICAL HISTORY:  Just tonsillectomy.     FAMILY HISTORY:  Nobody with colon cancer.  Nobody with advanced colon   adenomatous polyps.  No FAP, no attenuated FAP, no MAP, no Jung syndrome.    Nobody with celiac sprue or inflammatory bowel disease.  Nobody with chronic   hepatitis, cirrhosis of the liver, liver cancer, pancreatitis or pancreatic   cancer.     SOCIAL HISTORY:  Does drink alcohol, but rarely he says.  He does not smoke.  He   works in [employer] for the last nine years at the [email or web address] here   in [place].  He is single, never .  He has one healthy 5-year-old son   who lives with him.  He is a single parent he says.     PHYSICAL EXAMINATION:  /62 (BP Location: Left arm, Patient Position: Sitting)   Pulse 90   Resp 16   Ht 5' 11" (1.803 m)   Wt 70.9 kg (156 lb 4.9 oz)   BMI 21.80 kg/m²   GENERAL:  He is alert and oriented x4, not in any acute distress.  ABDOMEN:  Muscular.  It is thin.  Nondistended.  No guarding.  No rebound.  No   tenderness.  No palpable organomegaly.  No bruits.  No pulsatile masses.  No   stigmata of chronic liver disease.  No appreciative ascites or hernias.    Normoactive bowel sounds.  " No Humphreys sign.  No McBurney point tenderness.  No   CVA tenderness.  Multiple tattoos.  RECTAL:  Chaperone in the room Sisi.  Chlamydia and gonorrhea rectal swab done.  Rectal exam shows no external lesions no fissures no induration no fluctuance no crepitance no drainage  No erythema no lesions. No fistulas.  Good rectal tone no palpable rectal masses no bleeding seen.  EYES:  Conjunctivae are anicteric.  SKIN:  No petechiae or rash on exposed skin areas.  NEUROLOGIC:  Alert and oriented x4.  PSYCHIATRIC:  Normal speech, mentation and affect.  LYMPHATICS:  No cervical or supraclavicular lymphadenopathy.  No appreciative   thyromegaly.     MEDICAL DECISION MAKING:  As above.    Labs have been reviewed proctitis talk given constipation talk given  Fiber talk given water talk given avoidance or narcotic pain pills given  Canasa suppository talk given.  Both colonoscopy images and path reviewed.  Colonoscopy images personally reviewed by myself.  March of 2017 in February 2019.      IMPRESSION AND PLAN:    1.  History of proctitis.  And constipation from narcotics.  Recommend Metamucil 1 tbsp in 12 oz water before breakfast and before dinner daily.  Recommend MiraLax 17 g 12 oz of water before dinner.  One Colace pill over-the-counter as needed once daily for hard stools.  Canasa suppositories 1 nightly.  Lab work today.  2.  Follow-up rectal swab.  3.  Recommend hepatitis a and B vaccination.  4.  Follow recommendations for chronic constipation.  5.  Return to GI clinic in 3 months for follow-up.  Sooner if symptoms worsen or any bleeding.  slight anemia, also history of taking NSAIDs and proctitis.  We will set up the   patient for lab work.  The patient says okay to check labs.  We will also set up   for an EGD and colonoscopy first available for further evaluation.  Recommend   the patient return to GI Clinic in eight weeks for followup.

## 2019-09-09 DIAGNOSIS — K62.89 PROCTITIS: Primary | ICD-10-CM

## 2019-09-10 ENCOUNTER — LAB VISIT (OUTPATIENT)
Dept: LAB | Facility: HOSPITAL | Age: 29
End: 2019-09-10
Attending: INTERNAL MEDICINE
Payer: COMMERCIAL

## 2019-09-10 ENCOUNTER — TELEPHONE (OUTPATIENT)
Dept: GASTROENTEROLOGY | Facility: CLINIC | Age: 29
End: 2019-09-10

## 2019-09-10 DIAGNOSIS — K62.89 PROCTITIS: ICD-10-CM

## 2019-09-10 PROCEDURE — 87591 N.GONORRHOEAE DNA AMP PROB: CPT

## 2019-09-10 NOTE — TELEPHONE ENCOUNTER
Called and spoke with Tati [id number] re clinic collect sent to lab on 09/06.  Notified Tati Frausto resubmitted the orders as a future standing order.  Tati states she will put the required info in.

## 2019-09-20 LAB
C TRACH RRNA SPEC QL NAA+PROBE: NEGATIVE
C.TRACH RNA SOURCE: NORMAL
N GONORRHOEA RRNA SPEC QL NAA+PROBE: NEGATIVE
N.GONORROHEAE, AMP RNA SOURCE: NORMAL

## 2019-09-24 ENCOUNTER — TELEPHONE (OUTPATIENT)
Dept: GASTROENTEROLOGY | Facility: CLINIC | Age: 29
End: 2019-09-24

## 2019-09-24 NOTE — TELEPHONE ENCOUNTER
----- Message from Jey Frausto MD sent at 9/22/2019  7:03 PM CDT -----  Sisi - please tell Adi that his rectal swabs were negative.

## 2020-02-07 ENCOUNTER — OFFICE VISIT (OUTPATIENT)
Dept: GASTROENTEROLOGY | Facility: CLINIC | Age: 30
End: 2020-02-07
Payer: COMMERCIAL

## 2020-02-07 VITALS
DIASTOLIC BLOOD PRESSURE: 59 MMHG | SYSTOLIC BLOOD PRESSURE: 116 MMHG | HEART RATE: 77 BPM | BODY MASS INDEX: 21.2 KG/M2 | WEIGHT: 152 LBS

## 2020-02-07 DIAGNOSIS — K92.1 HEMATOCHEZIA: ICD-10-CM

## 2020-02-07 DIAGNOSIS — K62.89 PROCTITIS: ICD-10-CM

## 2020-02-07 DIAGNOSIS — K51.20 ULCERATIVE PROCTITIS WITHOUT COMPLICATION: Primary | ICD-10-CM

## 2020-02-07 PROCEDURE — 99215 PR OFFICE/OUTPT VISIT, EST, LEVL V, 40-54 MIN: ICD-10-PCS | Mod: S$GLB,,, | Performed by: INTERNAL MEDICINE

## 2020-02-07 PROCEDURE — 99215 OFFICE O/P EST HI 40 MIN: CPT | Mod: S$GLB,,, | Performed by: INTERNAL MEDICINE

## 2020-02-07 PROCEDURE — 99999 PR PBB SHADOW E&M-EST. PATIENT-LVL III: ICD-10-PCS | Mod: PBBFAC,,, | Performed by: INTERNAL MEDICINE

## 2020-02-07 PROCEDURE — 99999 PR PBB SHADOW E&M-EST. PATIENT-LVL III: CPT | Mod: PBBFAC,,, | Performed by: INTERNAL MEDICINE

## 2020-02-07 RX ORDER — MESALAMINE 1000 MG/1
1000 SUPPOSITORY RECTAL NIGHTLY
Qty: 30 SUPPOSITORY | Refills: 11 | Status: SHIPPED | OUTPATIENT
Start: 2020-02-07 | End: 2020-05-15 | Stop reason: SDUPTHER

## 2020-02-07 RX ORDER — HYDROCORTISONE ACETATE 25 MG/1
25 SUPPOSITORY RECTAL NIGHTLY
Qty: 30 SUPPOSITORY | Refills: 5 | Status: SHIPPED | OUTPATIENT
Start: 2020-02-07 | End: 2020-05-15 | Stop reason: SDUPTHER

## 2020-02-07 NOTE — PROGRESS NOTES
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 Diagnoses of Hematochezia and Proctitis were also pertinent to this visit.  Referring provider/PCP: Primary Doctor No    HPI:  Adi Coleman III is a 29 y.o. male here today for evaluation and 2nd opinion regarding colitis. New to me.  Previously followed with Dr. Frausto at [facility].    Patient states he has had intermittent rectal bleeding for at least 2-3 years now.  He states in the past he would take the canasa suppositories and the bleeding would resolve pretty quickly within a few weeks of taking it.  He would go months without having recurrence of symptoms.  He states his last scope was in September of last year.  At that time he started again this canasa and his symptoms went away.  However now he has had more persistent rectal bleeding and he feels he has been pretty consistent with canasa for about a month or so now.  He has not taken it in about for 5 days however. He continues with rectal bleeding. He has fluctuating stool habits between loose and hard. No excess straining. He is concerned about his dietary measures. No pain on defecation.  He also has multiple questions about the diagnosis of colitis and whether not he truly has ulcerative colitis as he was perhaps told in the past that he did not have this disease. He is also worried about taking fiber and is not sure about what to do about this as he was told by his prior  and instructed to this.      Prior Endoscopy:  EGD:  2/2019 ray  Impression:           - The examined portion of the ileum was normal.                        - Mild (Berrios Score 1) proctitis ulcerative colitis,                         unchanged since the last examination. Biopsied.  PATH:  1. Rectum, biopsy:  - Mild-to-moderate chronic colitis with minimal focal activity  - Negative for dysplasia or malignancy    Colon:  2017  karen  Erythematous mucosa in rectum, biopsied  Otherwise normal and TI normal  Recd start canasa  PATH  2. DISTAL RECTUM:  -Acute proctitis  -Negative for dysplasia or malignancy  -See microscopic examination    (Portions of this note were dictated using voice recognition software and may contain dictation related errors in spelling/grammar/syntax not found on text review)    Review of Systems   Constitutional: Negative for fever and malaise/fatigue.   Respiratory: Negative for cough and shortness of breath.    Cardiovascular: Negative for chest pain and palpitations.   Gastrointestinal: Positive for blood in stool. Negative for abdominal pain, nausea and vomiting.   Musculoskeletal: Positive for back pain.   Neurological: Negative for dizziness and headaches.       Past Medical History: has a past medical history of Asthma and Depression.    Past Surgical History: has a past surgical history that includes Tonsillectomy; Colonoscopy (N/A, 3/8/2017); Mouth surgery; and Colonoscopy (N/A, 2/20/2019).    Family History:family history is not on file.    Allergies: Review of patient's allergies indicates:  No Known Allergies    Social History: reports that he has quit smoking. He has never used smokeless tobacco. He reports that he drinks alcohol. He reports that he does not use drugs.    Home medications:   Current Outpatient Medications on File Prior to Visit   Medication Sig Dispense Refill    [DISCONTINUED] mesalamine (CANASA) 1000 MG Supp Place 1 suppository (1,000 mg total) rectally nightly. 30 suppository 11    hydrOXYzine HCl (ATARAX) 25 MG tablet Take 2 tablets (50 mg total) by mouth every 6 (six) hours as needed for Anxiety. (Patient not taking: Reported on 2/7/2020) 20 tablet 0    ondansetron (ZOFRAN) 4 MG tablet Take 1 tablet (4 mg total) by mouth every 8 (eight) hours as needed for Nausea. (Patient not taking: Reported on 2/7/2020) 12 tablet 0    [DISCONTINUED] hydrocortisone 2.5 % cream Apply  topically 2 (two) times daily. 20 g 0     No current facility-administered medications on file prior to visit.        Vital signs:  BP (!) 116/59 (BP Location: Left arm, Patient Position: Sitting, BP Method: Large (Automatic))   Pulse 77   Wt 68.9 kg (152 lb)   BMI 21.20 kg/m²     Physical Exam   Constitutional: He appears well-nourished. No distress.   Eyes: Conjunctivae are normal. No scleral icterus.   Pulmonary/Chest: Effort normal. No respiratory distress.   Abdominal: Soft. Bowel sounds are normal. He exhibits no mass. There is no tenderness.   Psychiatric: He has a normal mood and affect. His behavior is normal.   Vitals reviewed.      Routine labs:  Lab Results   Component Value Date    WBC 6.93 02/15/2019    HGB 14.8 04/20/2019    HCT 41.2 02/15/2019    MCV 89 02/15/2019     02/15/2019     No results found for: INR  Lab Results   Component Value Date    IRON 82 04/20/2019    FERRITIN 168 04/20/2019    TIBC 314 04/20/2019    FESATURATED 26 04/20/2019     Lab Results   Component Value Date     02/15/2019    K 3.8 02/15/2019     02/15/2019    CO2 33 (H) 02/15/2019    BUN 18 02/15/2019    CREATININE 1.0 02/15/2019     Lab Results   Component Value Date    ALBUMIN 4.3 02/15/2019    ALT 17 02/15/2019    AST 19 02/15/2019    ALKPHOS 64 02/15/2019    BILITOT 0.6 02/15/2019     No results found for: GLUCOSE    I have reviewed prior labs, imaging, notes from prior gi visits as well as test results and prior colonoscopy and images.  Negative rectal swabs   Negative hep b /c testing  He has been vaccinated for both recently.    Assessment:  1. Ulcerative proctitis without complication    2. Hematochezia    3. Proctitis      Long discussion with patient regarding his prior diagnostic workup and reviewed his prior studies with him. Discussed diet and lifestyle measures.  We discussed the diagnosis of UC - proctitis at length.      Plan:  Orders Placed This Encounter    hydrocortisone (ANUSOL-HC) 25  mg suppository    mesalamine (CANASA) 1000 MG Supp     Advised strict adherence to regimen of canasa daily and anusol qhs x at least 1 month  If no improvement with adherence for a month, will need to consider flex sig as this could indicate disease progression    - would get basic labs prior including CRP and could also check calprotectin    RTC 6 weeks for update on symptoms    Alex Dixon MD  [facility]    A total of 45 minutes were spent face-to-face with the patient during this encounter and over half of that time was spent on counseling and coordination of care.

## 2020-03-19 ENCOUNTER — PATIENT MESSAGE (OUTPATIENT)
Dept: GASTROENTEROLOGY | Facility: CLINIC | Age: 30
End: 2020-03-19

## 2020-03-19 ENCOUNTER — TELEPHONE (OUTPATIENT)
Dept: GASTROENTEROLOGY | Facility: CLINIC | Age: 30
End: 2020-03-19

## 2020-03-19 NOTE — TELEPHONE ENCOUNTER
I called patient about his appointment tomorrow with Dr. Dixon. We are trying to get him set up for the virtual visit or to reschedule his appointment.

## 2020-05-15 ENCOUNTER — OFFICE VISIT (OUTPATIENT)
Dept: GASTROENTEROLOGY | Facility: CLINIC | Age: 30
End: 2020-05-15
Payer: COMMERCIAL

## 2020-05-15 VITALS — HEIGHT: 71 IN | BODY MASS INDEX: 21.45 KG/M2 | WEIGHT: 153.25 LBS

## 2020-05-15 DIAGNOSIS — K92.1 HEMATOCHEZIA: ICD-10-CM

## 2020-05-15 DIAGNOSIS — K51.20 ULCERATIVE PROCTITIS WITHOUT COMPLICATION: ICD-10-CM

## 2020-05-15 DIAGNOSIS — K62.89 PROCTITIS: ICD-10-CM

## 2020-05-15 PROCEDURE — 99999 PR PBB SHADOW E&M-EST. PATIENT-LVL III: ICD-10-PCS | Mod: PBBFAC,,, | Performed by: INTERNAL MEDICINE

## 2020-05-15 PROCEDURE — 99214 OFFICE O/P EST MOD 30 MIN: CPT | Mod: S$GLB,,, | Performed by: INTERNAL MEDICINE

## 2020-05-15 PROCEDURE — 99214 PR OFFICE/OUTPT VISIT, EST, LEVL IV, 30-39 MIN: ICD-10-PCS | Mod: S$GLB,,, | Performed by: INTERNAL MEDICINE

## 2020-05-15 PROCEDURE — 99999 PR PBB SHADOW E&M-EST. PATIENT-LVL III: CPT | Mod: PBBFAC,,, | Performed by: INTERNAL MEDICINE

## 2020-05-15 RX ORDER — MESALAMINE 1000 MG/1
1000 SUPPOSITORY RECTAL NIGHTLY
Qty: 30 SUPPOSITORY | Refills: 11 | Status: SHIPPED | OUTPATIENT
Start: 2020-05-15 | End: 2020-11-12

## 2020-05-15 RX ORDER — HYDROCORTISONE ACETATE 25 MG/1
25 SUPPOSITORY RECTAL NIGHTLY
Qty: 30 SUPPOSITORY | Refills: 5 | Status: SHIPPED | OUTPATIENT
Start: 2020-05-15 | End: 2020-11-12

## 2020-05-15 NOTE — PROGRESS NOTES
GASTROENTEROLOGY CLINIC NOTE    Reason for visit: Diagnoses of Ulcerative proctitis without complication, Hematochezia, and Proctitis were pertinent to this visit.  Referring provider/PCP: Primary Doctor No    HPI:  Adi Coleman III is a 29 y.o. male here today for follow up proctitis.  Previously followed with Dr. Frausto at [facility].    Interval:  He arrives > 15 minutes late for his appt.    He started having some central to epigastric pain that lasted for a week or 2., + nausea, no vomiting. Was taking kratom, stopped taking Kratom and pain went away.  No further pain after stopping and it has been a couple days. + stressors and does feel like this changes the pain.    Using both canasa and anusol routinely over the past week or so and seeing less blood. No diarrhea, one BM / day.   Does not regularly use the suppositories otherwise though.    ===================================  Initial HPI:  Patient states he has had intermittent rectal bleeding for at least 2-3 years now.  He states in the past he would take the canasa suppositories and the bleeding would resolve pretty quickly within a few weeks of taking it.  He would go months without having recurrence of symptoms.  He states his last scope was in September of last year.  At that time he started again this canasa and his symptoms went away.  However now he has had more persistent rectal bleeding and he feels he has been pretty consistent with canasa for about a month or so now.  He has not taken it in about for 5 days however. He continues with rectal bleeding. He has fluctuating stool habits between loose and hard. No excess straining. He is concerned about his dietary measures. No pain on defecation.  He also has multiple questions about the diagnosis of colitis and whether not he truly has ulcerative colitis as he was perhaps told in the past that he did not have this disease. He is also worried about taking fiber and is not sure about what to  do about this as he was told by his prior  and instructed to this.      Prior Endoscopy:  EGD:  2/2019 ray  Impression:           - The examined portion of the ileum was normal.                        - Mild (Berrios Score 1) proctitis ulcerative colitis,                         unchanged since the last examination. Biopsied.  PATH:  1. Rectum, biopsy:  - Mild-to-moderate chronic colitis with minimal focal activity  - Negative for dysplasia or malignancy    Colon:  2017 jones  Erythematous mucosa in rectum, biopsied  Otherwise normal and TI normal  Recd start canasa  PATH  2. DISTAL RECTUM:  -Acute proctitis  -Negative for dysplasia or malignancy  -See microscopic examination    (Portions of this note were dictated using voice recognition software and may contain dictation related errors in spelling/grammar/syntax not found on text review)    Review of Systems   Constitutional: Negative for fever and malaise/fatigue.   Respiratory: Negative for cough and shortness of breath.    Cardiovascular: Negative for chest pain and palpitations.   Gastrointestinal: Positive for abdominal pain. Negative for blood in stool, nausea and vomiting.   Musculoskeletal: Negative for back pain.   Neurological: Negative for dizziness and headaches.       Past Medical History: has a past medical history of Asthma and Depression.    Past Surgical History: has a past surgical history that includes Tonsillectomy; Colonoscopy (N/A, 3/8/2017); Mouth surgery; and Colonoscopy (N/A, 2/20/2019).    Family History:family history is not on file.    Allergies: Review of patient's allergies indicates:  No Known Allergies    Social History: reports that he has quit smoking. He has never used smokeless tobacco. He reports that he drinks alcohol. He reports that he does not use drugs.    Home medications:   Current Outpatient Medications on File Prior to Visit   Medication Sig Dispense Refill    [DISCONTINUED] hydrocortisone (ANUSOL-HC) 25 mg  "suppository Place 1 suppository (25 mg total) rectally every evening. 30 suppository 5    [DISCONTINUED] mesalamine (CANASA) 1000 MG Supp Place 1 suppository (1,000 mg total) rectally nightly. 30 suppository 11    hydrOXYzine HCl (ATARAX) 25 MG tablet Take 2 tablets (50 mg total) by mouth every 6 (six) hours as needed for Anxiety. (Patient not taking: Reported on 2/7/2020) 20 tablet 0    ondansetron (ZOFRAN) 4 MG tablet Take 1 tablet (4 mg total) by mouth every 8 (eight) hours as needed for Nausea. (Patient not taking: Reported on 2/7/2020) 12 tablet 0    [DISCONTINUED] hydrocortisone 2.5 % cream Apply topically 2 (two) times daily. 20 g 0     No current facility-administered medications on file prior to visit.        Vital signs:  Ht 5' 11" (1.803 m)   Wt 69.5 kg (153 lb 3.5 oz)   BMI 21.37 kg/m²     Physical Exam   Constitutional: He is oriented to person, place, and time. He appears well-nourished. No distress.   Eyes: Conjunctivae are normal. No scleral icterus.   Pulmonary/Chest: Effort normal. No respiratory distress.   Neurological: He is alert and oriented to person, place, and time.   Psychiatric: He has a normal mood and affect. His behavior is normal.   Vitals reviewed.      Routine labs:  Lab Results   Component Value Date    WBC 6.93 02/15/2019    HGB 14.8 04/20/2019    HCT 41.2 02/15/2019    MCV 89 02/15/2019     02/15/2019     No results found for: INR  Lab Results   Component Value Date    IRON 82 04/20/2019    FERRITIN 168 04/20/2019    TIBC 314 04/20/2019    FESATURATED 26 04/20/2019     Lab Results   Component Value Date     02/15/2019    K 3.8 02/15/2019     02/15/2019    CO2 33 (H) 02/15/2019    BUN 18 02/15/2019    CREATININE 1.0 02/15/2019     Lab Results   Component Value Date    ALBUMIN 4.3 02/15/2019    ALT 17 02/15/2019    AST 19 02/15/2019    ALKPHOS 64 02/15/2019    BILITOT 0.6 02/15/2019     No results found for: GLUCOSE    I have reviewed prior labs, imaging, " notes from prior gi visits as well as test results and prior colonoscopy and images.  Negative rectal swabs   Negative hep b /c testing  He has been vaccinated for both recently.    Assessment:  1. Ulcerative proctitis without complication    2. Hematochezia    3. Proctitis      Discussed importance to adherence to therapy.    Plan:  Orders Placed This Encounter    hydrocortisone (ANUSOL-HC) 25 mg suppository    mesalamine (CANASA) 1000 MG Supp       Advised strict adherence to regimen of canasa daily and anusol qhs x at least 1 month    If no improvement with adherence for a month, will need to consider flex sig as this could indicate disease progression    - would get basic labs prior including CRP and could also check calprotectin    RTC 8  - 12 months    - can consider routine flex sig sometime next year for monitoring.    Alex Dixon MD  [facility] [place]

## 2020-08-30 ENCOUNTER — HOSPITAL ENCOUNTER (EMERGENCY)
Facility: HOSPITAL | Age: 30
Discharge: HOME | End: 2020-08-30
Attending: EMERGENCY MEDICINE
Payer: COMMERCIAL

## 2020-08-30 VITALS
TEMPERATURE: 98 F | DIASTOLIC BLOOD PRESSURE: 80 MMHG | RESPIRATION RATE: 17 BRPM | WEIGHT: 150 LBS | SYSTOLIC BLOOD PRESSURE: 128 MMHG | HEIGHT: 71 IN | OXYGEN SATURATION: 100 % | BODY MASS INDEX: 21 KG/M2 | HEART RATE: 92 BPM

## 2020-08-30 DIAGNOSIS — Z76.0 ENCOUNTER FOR MEDICATION REFILL: Primary | ICD-10-CM

## 2020-08-30 DIAGNOSIS — S52.201A CLOSED FRACTURE OF RIGHT RADIUS AND ULNA, INITIAL ENCOUNTER: ICD-10-CM

## 2020-08-30 DIAGNOSIS — Z87.09 HISTORY OF PNEUMOTHORAX: ICD-10-CM

## 2020-08-30 DIAGNOSIS — S52.91XA CLOSED FRACTURE OF RIGHT RADIUS AND ULNA, INITIAL ENCOUNTER: ICD-10-CM

## 2020-08-30 DIAGNOSIS — S22.41XA MULTIPLE FRACTURES OF RIBS, RIGHT SIDE, INITIAL ENCOUNTER FOR CLOSED FRACTURE: ICD-10-CM

## 2020-08-30 PROCEDURE — 99283 EMERGENCY DEPT VISIT LOW MDM: CPT

## 2020-08-30 RX ORDER — OXYCODONE HYDROCHLORIDE 10 MG/1
10 TABLET ORAL EVERY 4 HOURS PRN
Qty: 18 TABLET | Refills: 0 | Status: SHIPPED | OUTPATIENT
Start: 2020-08-30 | End: 2020-09-01 | Stop reason: SDUPTHER
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
## 2020-08-30 NOTE — DISCHARGE INSTRUCTIONS
Thank you for choosing [facility] [place]! We appreciate you coming to us for your medical care. We hope you feel better soon! Please come back to [facility] for all of your future medical needs.    Our goal in the emergency department is to always give you outstanding care and exceptional service. You may receive a survey by mail or e-mail in the next week regarding your experience in our ED. We would greatly appreciate your completing and returning the survey. Your feedback provides us with a way to recognize our staff who give very good care and it helps us learn how to improve when your experience was below our aspiration of excellence.       Sincerely,    Jimmy Guzman MD  Medical Director  Emergency Department  [facility]

## 2020-09-01 ENCOUNTER — OFFICE VISIT (OUTPATIENT)
Dept: ORTHOPEDICS | Facility: CLINIC | Age: 30
End: 2020-09-01
Payer: COMMERCIAL

## 2020-09-01 ENCOUNTER — CLINICAL SUPPORT (OUTPATIENT)
Dept: URGENT CARE | Facility: CLINIC | Age: 30
End: 2020-09-01
Payer: COMMERCIAL

## 2020-09-01 ENCOUNTER — TELEPHONE (OUTPATIENT)
Dept: ORTHOPEDICS | Facility: CLINIC | Age: 30
End: 2020-09-01

## 2020-09-01 ENCOUNTER — HOSPITAL ENCOUNTER (OUTPATIENT)
Dept: RADIOLOGY | Facility: HOSPITAL | Age: 30
Discharge: HOME | End: 2020-09-01
Attending: ORTHOPAEDIC SURGERY
Payer: COMMERCIAL

## 2020-09-01 VITALS — TEMPERATURE: 98 F

## 2020-09-01 DIAGNOSIS — Z41.9 ELECTIVE SURGERY: ICD-10-CM
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
## 2020-09-01 DIAGNOSIS — Z41.9 ELECTIVE SURGERY: Primary | ICD-10-CM
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
## 2020-09-01 DIAGNOSIS — S52.91XA CLOSED FRACTURE OF RIGHT RADIUS AND ULNA, INITIAL ENCOUNTER: Primary | ICD-10-CM

## 2020-09-01 DIAGNOSIS — S52.201A CLOSED FRACTURE OF RIGHT RADIUS AND ULNA, INITIAL ENCOUNTER: Primary | ICD-10-CM

## 2020-09-01 PROCEDURE — 99204 OFFICE O/P NEW MOD 45 MIN: CPT | Mod: S$GLB,,, | Performed by: ORTHOPAEDIC SURGERY

## 2020-09-01 PROCEDURE — 73100 X-RAY EXAM OF WRIST: CPT | Mod: 26,RT,, | Performed by: RADIOLOGY

## 2020-09-01 PROCEDURE — 73080 X-RAY EXAM OF ELBOW: CPT | Mod: TC,PN,RT

## 2020-09-01 PROCEDURE — 73080 XR ELBOW COMPLETE 3 VIEW RIGHT: ICD-10-PCS | Mod: 26,RT,, | Performed by: RADIOLOGY

## 2020-09-01 PROCEDURE — 99999 PR PBB SHADOW E&M-EST. PATIENT-LVL IV: ICD-10-PCS | Mod: PBBFAC,,, | Performed by: ORTHOPAEDIC SURGERY

## 2020-09-01 PROCEDURE — 99204 PR OFFICE/OUTPT VISIT, NEW, LEVL IV, 45-59 MIN: ICD-10-PCS | Mod: S$GLB,,, | Performed by: ORTHOPAEDIC SURGERY

## 2020-09-01 PROCEDURE — 73080 X-RAY EXAM OF ELBOW: CPT | Mod: 26,RT,, | Performed by: RADIOLOGY

## 2020-09-01 PROCEDURE — U0003 INFECTIOUS AGENT DETECTION BY NUCLEIC ACID (DNA OR RNA); SEVERE ACUTE RESPIRATORY SYNDROME CORONAVIRUS 2 (SARS-COV-2) (CORONAVIRUS DISEASE [COVID-19]), AMPLIFIED PROBE TECHNIQUE, MAKING USE OF HIGH THROUGHPUT TECHNOLOGIES AS DESCRIBED BY CMS-2020-01-R: HCPCS

## 2020-09-01 PROCEDURE — 73100 X-RAY EXAM OF WRIST: CPT | Mod: TC,PN,RT

## 2020-09-01 PROCEDURE — 99999 PR PBB SHADOW E&M-EST. PATIENT-LVL IV: CPT | Mod: PBBFAC,,, | Performed by: ORTHOPAEDIC SURGERY

## 2020-09-01 PROCEDURE — 73100 XR WRIST 2 VIEW RIGHT: ICD-10-PCS | Mod: 26,RT,, | Performed by: RADIOLOGY

## 2020-09-01 RX ORDER — SODIUM CHLORIDE 9 MG/ML
INJECTION, SOLUTION INTRAVENOUS CONTINUOUS
Status: CANCELLED | OUTPATIENT
Start: 2020-09-01

## 2020-09-01 RX ORDER — MUPIROCIN 20 MG/G
OINTMENT TOPICAL
Status: CANCELLED | OUTPATIENT
Start: 2020-09-01

## 2020-09-01 RX ORDER — OXYCODONE HYDROCHLORIDE 10 MG/1
10 TABLET ORAL EVERY 4 HOURS PRN
Qty: 18 TABLET | Refills: 0 | Status: SHIPPED | OUTPATIENT
Start: 2020-09-01 | End: 2020-09-24 | Stop reason: ALTCHOICE
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
## 2020-09-01 NOTE — LETTER
September 1, 2020      Jimmy Guzman MD  [address]  [facility] [address]           [facility]  [address], [address]  [facility] [address]  Phone: [phone]          Patient: Adi Coleman III   MR Number: [id number]   YOB: 1990   Date of Visit: 9/1/2020       Dear Dr. Jimmy Guzman:    Thank you for referring Adi Coleman to me for evaluation. Attached you will find relevant portions of my assessment and plan of care.    If you have questions, please do not hesitate to call me. I look forward to following Adi Coleman along with you.    Sincerely,    Antonieta Aguero PA-C    Enclosure  CC:  No Recipients    If you would like to receive this communication electronically, please contact [email or web address] or [phone] to request more information on [employer] access.    For providers and/or their staff who would like to refer a patient to [facility], please contact us through our one-stop-shop provider referral line, [facility] , at [phone].    If you feel you have received this communication in error or would no longer like to receive these types of communications, please e-mail [email or web address]

## 2020-09-02 ENCOUNTER — TELEPHONE (OUTPATIENT)
Dept: ORTHOPEDICS | Facility: CLINIC | Age: 30
End: 2020-09-02

## 2020-09-02 LAB — SARS-COV-2 RNA RESP QL NAA+PROBE: NOT DETECTED

## 2020-09-04 ENCOUNTER — ANESTHESIA EVENT (OUTPATIENT)
Dept: SURGERY | Facility: HOSPITAL | Age: 30
End: 2020-09-04
Payer: COMMERCIAL

## 2020-09-04 ENCOUNTER — ANESTHESIA (OUTPATIENT)
Dept: SURGERY | Facility: HOSPITAL | Age: 30
End: 2020-09-04
Payer: COMMERCIAL

## 2020-09-04 ENCOUNTER — HOSPITAL ENCOUNTER (OUTPATIENT)
Facility: HOSPITAL | Age: 30
Discharge: HOME | End: 2020-09-04
Attending: ORTHOPAEDIC SURGERY | Admitting: ORTHOPAEDIC SURGERY
Payer: COMMERCIAL

## 2020-09-04 VITALS
TEMPERATURE: 98 F | OXYGEN SATURATION: 100 % | HEIGHT: 71 IN | HEART RATE: 84 BPM | SYSTOLIC BLOOD PRESSURE: 135 MMHG | RESPIRATION RATE: 18 BRPM | WEIGHT: 150 LBS | BODY MASS INDEX: 21 KG/M2 | DIASTOLIC BLOOD PRESSURE: 72 MMHG

## 2020-09-04 DIAGNOSIS — S52.91XA CLOSED FRACTURE OF RIGHT RADIUS AND ULNA, INITIAL ENCOUNTER: ICD-10-CM

## 2020-09-04 DIAGNOSIS — S52.201A CLOSED FRACTURE OF RIGHT RADIUS AND ULNA, INITIAL ENCOUNTER: ICD-10-CM

## 2020-09-04 PROCEDURE — 25000003 PHARM REV CODE 250: Performed by: ORTHOPAEDIC SURGERY

## 2020-09-04 PROCEDURE — 25609 PR OPEN RX DISTAL RADIUS FX, INTRA-ARTICULAR, 3+ FRAG: ICD-10-PCS | Mod: AS,RT,, | Performed by: ORTHOPAEDIC SURGERY

## 2020-09-04 PROCEDURE — 63600175 PHARM REV CODE 636 W HCPCS: Performed by: ORTHOPAEDIC SURGERY

## 2020-09-04 PROCEDURE — 71000016 HC POSTOP RECOV ADDL HR: Performed by: ORTHOPAEDIC SURGERY

## 2020-09-04 PROCEDURE — C1769 GUIDE WIRE: HCPCS | Performed by: ORTHOPAEDIC SURGERY

## 2020-09-04 PROCEDURE — 27201423 OPTIME MED/SURG SUP & DEVICES STERILE SUPPLY: Performed by: ORTHOPAEDIC SURGERY

## 2020-09-04 PROCEDURE — C1713 ANCHOR/SCREW BN/BN,TIS/BN: HCPCS | Performed by: ORTHOPAEDIC SURGERY

## 2020-09-04 PROCEDURE — 37000008 HC ANESTHESIA 1ST 15 MINUTES: Performed by: ORTHOPAEDIC SURGERY

## 2020-09-04 PROCEDURE — 25609 PR OPEN RX DISTAL RADIUS FX, INTRA-ARTICULAR, 3+ FRAG: ICD-10-PCS | Mod: RT,,, | Performed by: ORTHOPAEDIC SURGERY

## 2020-09-04 PROCEDURE — 63600175 PHARM REV CODE 636 W HCPCS: Performed by: STUDENT IN AN ORGANIZED HEALTH CARE EDUCATION/TRAINING PROGRAM

## 2020-09-04 PROCEDURE — 25609 OPTX DST RD XART FX/EP SEP3+: CPT | Mod: AS,RT,, | Performed by: ORTHOPAEDIC SURGERY

## 2020-09-04 PROCEDURE — 76942 ECHO GUIDE FOR BIOPSY: CPT | Performed by: STUDENT IN AN ORGANIZED HEALTH CARE EDUCATION/TRAINING PROGRAM

## 2020-09-04 PROCEDURE — 25609 OPTX DST RD XART FX/EP SEP3+: CPT | Mod: RT,,, | Performed by: ORTHOPAEDIC SURGERY

## 2020-09-04 PROCEDURE — 63600175 PHARM REV CODE 636 W HCPCS: Performed by: NURSE ANESTHETIST, CERTIFIED REGISTERED

## 2020-09-04 PROCEDURE — 36000711: Performed by: ORTHOPAEDIC SURGERY

## 2020-09-04 PROCEDURE — 36000710: Performed by: ORTHOPAEDIC SURGERY

## 2020-09-04 PROCEDURE — 71000015 HC POSTOP RECOV 1ST HR: Performed by: ORTHOPAEDIC SURGERY

## 2020-09-04 PROCEDURE — 37000009 HC ANESTHESIA EA ADD 15 MINS: Performed by: ORTHOPAEDIC SURGERY

## 2020-09-04 DEVICE — SCREW BONE LOK CONG 2.3X22MM: Type: IMPLANTABLE DEVICE | Site: HAND | Status: FUNCTIONAL

## 2020-09-04 DEVICE — SCREW BONE 2.3 X 24MM: Type: IMPLANTABLE DEVICE | Site: HAND | Status: FUNCTIONAL

## 2020-09-04 DEVICE — SCREW BONE CORTICAL THRD 2.3X1: Type: IMPLANTABLE DEVICE | Site: HAND | Status: FUNCTIONAL

## 2020-09-04 DEVICE — SCREW BNE LOK HEXLB 3.5X12: Type: IMPLANTABLE DEVICE | Site: HAND | Status: FUNCTIONAL

## 2020-09-04 DEVICE — SCREW BNE N LOK HEXLB 3.5X14: Type: IMPLANTABLE DEVICE | Site: HAND | Status: FUNCTIONAL

## 2020-09-04 DEVICE — SCREW BONE 2.3 X 20MM: Type: IMPLANTABLE DEVICE | Site: HAND | Status: FUNCTIONAL

## 2020-09-04 DEVICE — PLATE BONE DST VOLAR RAD STND: Type: IMPLANTABLE DEVICE | Site: HAND | Status: FUNCTIONAL

## 2020-09-04 DEVICE — PEG LOCKING CORITCAL 2.3X20MM: Type: IMPLANTABLE DEVICE | Site: HAND | Status: FUNCTIONAL

## 2020-09-04 DEVICE — IMPLANTABLE DEVICE: Type: IMPLANTABLE DEVICE | Site: HAND | Status: FUNCTIONAL

## 2020-09-04 DEVICE — SCREW BNE LOK HEXLB 3.5X16: Type: IMPLANTABLE DEVICE | Site: HAND | Status: FUNCTIONAL

## 2020-09-04 DEVICE — SCREW BONE 2.3 X 18MM: Type: IMPLANTABLE DEVICE | Site: HAND | Status: FUNCTIONAL

## 2020-09-04 DEVICE — SCREW BONE LOK CONG 2.3X20MM: Type: IMPLANTABLE DEVICE | Site: HAND | Status: FUNCTIONAL

## 2020-09-04 RX ORDER — SODIUM CHLORIDE, SODIUM LACTATE, POTASSIUM CHLORIDE, CALCIUM CHLORIDE 600; 310; 30; 20 MG/100ML; MG/100ML; MG/100ML; MG/100ML
INJECTION, SOLUTION INTRAVENOUS CONTINUOUS PRN
Status: DISCONTINUED | OUTPATIENT
Start: 2020-09-04 | End: 2020-09-04

## 2020-09-04 RX ORDER — HYDROCODONE BITARTRATE AND ACETAMINOPHEN 5; 325 MG/1; MG/1
1 TABLET ORAL EVERY 4 HOURS PRN
Status: DISCONTINUED | OUTPATIENT
Start: 2020-09-04 | End: 2020-09-04 | Stop reason: HOSPADM

## 2020-09-04 RX ORDER — CEFAZOLIN SODIUM 2 G/50ML
2 SOLUTION INTRAVENOUS
Status: COMPLETED | OUTPATIENT
Start: 2020-09-04 | End: 2020-09-04

## 2020-09-04 RX ORDER — VANCOMYCIN HYDROCHLORIDE 1 G/20ML
INJECTION, POWDER, LYOPHILIZED, FOR SOLUTION INTRAVENOUS
Status: DISCONTINUED | OUTPATIENT
Start: 2020-09-04 | End: 2020-09-04 | Stop reason: HOSPADM

## 2020-09-04 RX ORDER — ROPIVACAINE HYDROCHLORIDE 5 MG/ML
INJECTION, SOLUTION EPIDURAL; INFILTRATION; PERINEURAL
Status: DISCONTINUED | OUTPATIENT
Start: 2020-09-04 | End: 2020-09-04

## 2020-09-04 RX ORDER — LIDOCAINE HYDROCHLORIDE 20 MG/ML
INJECTION INTRAVENOUS
Status: DISCONTINUED | OUTPATIENT
Start: 2020-09-04 | End: 2020-09-04

## 2020-09-04 RX ORDER — ACETAMINOPHEN 325 MG/1
650 TABLET ORAL EVERY 4 HOURS PRN
Status: DISCONTINUED | OUTPATIENT
Start: 2020-09-04 | End: 2020-09-04 | Stop reason: HOSPADM

## 2020-09-04 RX ORDER — ACETAMINOPHEN 10 MG/ML
INJECTION, SOLUTION INTRAVENOUS
Status: DISCONTINUED | OUTPATIENT
Start: 2020-09-04 | End: 2020-09-04

## 2020-09-04 RX ORDER — PROPOFOL 10 MG/ML
VIAL (ML) INTRAVENOUS CONTINUOUS PRN
Status: DISCONTINUED | OUTPATIENT
Start: 2020-09-04 | End: 2020-09-04

## 2020-09-04 RX ORDER — OXYCODONE AND ACETAMINOPHEN 10; 325 MG/1; MG/1
1 TABLET ORAL EVERY 4 HOURS PRN
Qty: 40 TABLET | Refills: 0 | Status: SHIPPED | OUTPATIENT
Start: 2020-09-04 | End: 2020-09-14 | Stop reason: SDUPTHER

## 2020-09-04 RX ORDER — MIDAZOLAM HYDROCHLORIDE 1 MG/ML
INJECTION, SOLUTION INTRAMUSCULAR; INTRAVENOUS
Status: DISCONTINUED | OUTPATIENT
Start: 2020-09-04 | End: 2020-09-04

## 2020-09-04 RX ORDER — OXYCODONE HYDROCHLORIDE 5 MG/1
10 TABLET ORAL EVERY 4 HOURS PRN
Status: DISCONTINUED | OUTPATIENT
Start: 2020-09-04 | End: 2020-09-04 | Stop reason: HOSPADM

## 2020-09-04 RX ORDER — SODIUM CHLORIDE 9 MG/ML
INJECTION, SOLUTION INTRAVENOUS CONTINUOUS
Status: DISCONTINUED | OUTPATIENT
Start: 2020-09-04 | End: 2020-09-04 | Stop reason: HOSPADM

## 2020-09-04 RX ORDER — ONDANSETRON 8 MG/1
8 TABLET, ORALLY DISINTEGRATING ORAL EVERY 8 HOURS PRN
Status: DISCONTINUED | OUTPATIENT
Start: 2020-09-04 | End: 2020-09-04 | Stop reason: HOSPADM

## 2020-09-04 RX ADMIN — PROPOFOL 150 MCG/KG/MIN: 10 INJECTION, EMULSION INTRAVENOUS at 10:09

## 2020-09-04 RX ADMIN — ACETAMINOPHEN 1000 MG: 10 INJECTION, SOLUTION INTRAVENOUS at 11:09

## 2020-09-04 RX ADMIN — ROPIVACAINE HYDROCHLORIDE 30 ML: 5 INJECTION, SOLUTION EPIDURAL; INFILTRATION; PERINEURAL at 10:09

## 2020-09-04 RX ADMIN — SODIUM CHLORIDE, SODIUM LACTATE, POTASSIUM CHLORIDE, AND CALCIUM CHLORIDE: .6; .31; .03; .02 INJECTION, SOLUTION INTRAVENOUS at 10:09

## 2020-09-04 RX ADMIN — MIDAZOLAM 5 MG: 1 INJECTION INTRAMUSCULAR; INTRAVENOUS at 10:09

## 2020-09-04 RX ADMIN — SODIUM CHLORIDE: 0.9 INJECTION, SOLUTION INTRAVENOUS at 12:09

## 2020-09-04 RX ADMIN — CEFAZOLIN SODIUM 2 G: 2 SOLUTION INTRAVENOUS at 10:09

## 2020-09-04 RX ADMIN — LIDOCAINE HYDROCHLORIDE 100 MG: 20 INJECTION, SOLUTION INTRAVENOUS at 10:09

## 2020-09-04 NOTE — OP NOTE
Certification of Assistant at Surgery       Surgery Date: 9/4/2020     Participating Surgeons:  Surgeon(s) and Role:     * Misbah Leigh Jr., MD - Primary    Procedures:  Procedure(s) (LRB):  ORIF, FRACTURE, RADIUS, DISTAL (Right)    Assistant Surgeon's Certification of Necessity:  I understand that section 1842 (b) (6) (d) of the Social Security Act generally prohibits Medicare Part B reasonable charge payment for the services of assistants at surgery in teaching hospitals when qualified residents are available to furnish such services. I certify that the services for which payment is claimed were medically necessary, and that no qualified resident was available to perform the services. I further understand that these services are subject to post-payment review by the Medicare carrier.      Antonieta Aguero PA-C    09/04/2020  1:18 PM
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
## 2020-09-04 NOTE — TRANSFER OF CARE
"Anesthesia Transfer of Care Note    Patient: Adi Coleman III    Procedure(s) Performed: Procedure(s) (LRB):  ORIF, FRACTURE, RADIUS, DISTAL (Right)    Patient location: [facility]    Anesthesia Type: MAC    Transport from OR: Transported from OR on room air with adequate spontaneous ventilation    Post pain: adequate analgesia    Post assessment: no apparent anesthetic complications and tolerated procedure well    Post vital signs: stable    Level of consciousness: awake, alert and oriented    Nausea/Vomiting: no nausea/vomiting    Complications: none    Transfer of care protocol was followed      Last vitals:   Visit Vitals  /74   Pulse 94   Temp 36.6 °C (97.8 °F) (Skin)   Resp 18   Ht 5' 11" (1.803 m)   Wt 68 kg (150 lb)   SpO2 98%   BMI 20.92 kg/m²     "

## 2020-09-04 NOTE — ANESTHESIA POSTPROCEDURE EVALUATION
Anesthesia Post Evaluation    Patient: Adi Coleman III    Procedure(s) Performed: Procedure(s) (LRB):  ORIF, FRACTURE, RADIUS, DISTAL (Right)    Final Anesthesia Type: MAC    Patient location during evaluation: [facility]  Patient participation: Yes- Able to Participate  Level of consciousness: awake and alert and oriented  Post-procedure vital signs: reviewed and stable  Pain management: adequate  Airway patency: patent    PONV status at discharge: No PONV  Anesthetic complications: no      Cardiovascular status: blood pressure returned to baseline, hemodynamically stable and stable  Respiratory status: unassisted, spontaneous ventilation and room air  Hydration status: euvolemic  Follow-up not needed.          Vitals Value Taken Time   /65 09/04/20 1320   Temp 36 09/04/20 1320   Pulse 70 09/04/20 1320   Resp 15 09/04/20 1320   SpO2 100 09/04/20 1320         No case tracking events are documented in the log.      Pain/Carmen Score: Carmen Score: 10 (9/4/2020 10:40 AM)

## 2020-09-04 NOTE — ANESTHESIA PROCEDURE NOTES
Peripheral Block    Patient location during procedure: pre-op    Reason for block: primary anesthetic   Diagnosis: right radius fracture    Timeout: 9/4/2020 10:30 AM     Staffing  Authorizing Provider: Ashok Pimentel MD  Performing Provider: Juan Carpio MD    Preanesthetic Checklist  Completed: patient identified, site marked, surgical consent, pre-op evaluation, timeout performed, IV checked, risks and benefits discussed and monitors and equipment checked  Peripheral Block  Patient position: supine  Prep: ChloraPrep  Patient monitoring: heart rate, cardiac monitor, continuous pulse ox and frequent blood pressure checks  Block type: supraclavicular  Laterality: right  Injection technique: single shot  Needle  Needle type: Stimuplex   Needle gauge: 21 G  Needle length: 4 in  Needle localization: anatomical landmarks and ultrasound guidance   -ultrasound image captured on disc.  Assessment  Injection assessment: negative aspiration, negative parasthesia and local visualized surrounding nerve  Paresthesia pain: none  Heart rate change: no  Slow fractionated injection: yes  Additional Notes  VSS.  [facility] RN monitoring vitals throughout procedure.  Patient tolerated procedure well. 30 mL 0.5% ropivacaine

## 2020-09-04 NOTE — DISCHARGE INSTRUCTIONS
After Hand Surgery  After surgery, the better you take care of yourself--especially your hand--the sooner it will heal. Follow your surgeons instructions. Try not to bump your hand, and dont move or lift anything while youre still wearing bandages, a splint, or a cast.    Care for your hand    · Keep your hand elevated above heart level as much as possible for the first several days after surgery. This helps reduce swelling and pain.  · To help prevent infection and speed healing, take care not to get your cast or bandages wet.  ·   Relieve pain as directed  Your surgeon may prescribe pain medicine or suggest you take an anti-inflammatory medicine. You might also be instructed to apply ice (or another cold source) to your hand. If you use ice cubes, put them in a plastic bag and rest it on top of your bandages. Leave the cold source on your hand for as long as its comfortable. Do this several times a day for the first few days after surgery. It may take several minutes before you can feel the cold through the cast or bandages.    Follow up with your surgeon  During a follow-up visit after surgery, your surgeon will check your progress. The stitches, bandages, splint, or cast may be removed. A new cast or splint may be placed. If your hand has healed enough, your surgeon may prescribe exercises.    Call your surgeon if you have...  · A fever higher than 100.4°F (38.0°C) taken by mouth  · Side effects from your medicine, such as prolonged nausea  · A wet or loose dressing, or a dressing that is too tight  · Excessive bleeding  · Increased, ongoing pain or numbness  · Signs of infection (such as drainage, warmth, or redness) at the incision site     ANESTHESIA  -For the first 24 hours after surgery:  Do not drive, use heavy equipment, make important decisions, or drink alcohol  -It is normal to feel sleepy for several hours.  Rest until you are more awake.  -Have someone stay with you, if needed.  They can watch  for problems and help keep you safe.  -Some possible post anesthesia side effects include: nausea and vomiting, sore throat and hoarseness, sleepiness, and dizziness.    PAIN  -If you have pain after surgery, pain medicine will help you feel better.  Take it as directed, before pain becomes severe.  Most pain relievers taken by mouth need at least 20-30 minutes to start working.  -Do not drive or drink alcohol while taking pain medicine.  -Pain medication can upset your stomach.  Taking them with a little food may help.  -Other ways to help control pain: elevation, ice, and relaxation  -Call your surgeon if still having unmanageable pain an hour after taking pain medicine.  -Pain medicine can cause constipation.  Taking an over-the counter stool softener while on prescription pain medicine and drinking plenty of fluids can prevent this side effect.  -Call your surgeon if you have severe side effects like: breathing problems, trouble waking up, dizziness, confusion, or severe constipation.    NAUSEA  -Some people have nausea after surgery.  This is often because of anesthesia, pain, pain medicine, or the stress of surgery.  -Do not push yourself to eat.  Start off with clear liquids and soup.  Slowly move to solid foods.  Don't eat fatty, rich, spicy foods at first.  Eat smaller amounts.  -If you develop persistent nausea and vomiting please notify your surgeon immediately.    BLEEDING  -Different types of surgery require different types of care and dressing changes.  It is important to follow all instructions and advice from your surgeon.  Change dressing as directed.  Call your surgeon for any concerns regarding postop bleeding.    SIGNS OF INFECTION  -Signs of infection include: fever, swelling, drainage, and redness  -Notify your surgeon if you have a fever of 100.4 F (38.0 C) or higher.  -Notify your surgeon if you notice redness, swelling, increased pain, pus, or a foul smell at the incision site.    Notify   [place] for any problems or concerns.

## 2020-09-04 NOTE — INTERVAL H&P NOTE
The patient has been examined and the H&P has been reviewed:    I concur with the findings and no changes have occurred since H&P was written.    Surgery risks, benefits and alternative options discussed and understood by patient/family.          Active Hospital Problems    Diagnosis  POA    Closed fracture of right radius and ulna [S52.91XA, S52.201A]  Yes      Resolved Hospital Problems   No resolved problems to display.

## 2020-09-04 NOTE — ANESTHESIA PREPROCEDURE EVALUATION
09/04/2020  Adi Coleman III is a 29 y.o., male.  Patient Active Problem List   Diagnosis    Hematochezia    Screening for colorectal cancer    Closed fracture of right radius and ulna       Anesthesia Evaluation       I have reviewed the Medications.     Review of Systems  Anesthesia Hx:  Denies Family Hx of Anesthesia complications.   Social:  Alcohol Use, Former Smoker    Psych:   Psychiatric History          Physical Exam  General:  Well nourished    Airway/Jaw/Neck:  Airway Findings: Mouth Opening: Normal Tongue: Normal  General Airway Assessment: Adult  Mallampati: II      Dental:  Dental Findings: In tact   Chest/Lungs:  Chest/Lungs Findings: Clear to auscultation, Normal Respiratory Rate     Heart/Vascular:  Heart Findings: Rate: Normal  Rhythm: Regular Rhythm        Mental Status:  Mental Status Findings:  Cooperative, Alert and Oriented         Anesthesia Plan  Type of Anesthesia, risks & benefits discussed:  Anesthesia Type:  regional  Patient's Preference: general  Intra-op Monitoring Plan:   Intra-op Monitoring Plan Comments:   Post Op Pain Control Plan:   Post Op Pain Control Plan Comments:   Induction:   IV  Beta Blocker:  Patient is not currently on a Beta-Blocker (No further documentation required).       Informed Consent: Patient understands risks and agrees with Anesthesia plan.  Questions answered. Anesthesia consent signed with patient.  ASA Score: 2     Day of Surgery Review of History & Physical:            Ready For Surgery From Anesthesia Perspective.

## 2020-09-14 ENCOUNTER — TELEPHONE (OUTPATIENT)
Dept: ORTHOPEDICS | Facility: CLINIC | Age: 30
End: 2020-09-14

## 2020-09-14 DIAGNOSIS — S52.201A CLOSED FRACTURE OF RIGHT RADIUS AND ULNA, INITIAL ENCOUNTER: ICD-10-CM

## 2020-09-14 DIAGNOSIS — Z41.9 ELECTIVE SURGERY: ICD-10-CM

## 2020-09-14 DIAGNOSIS — S52.91XA CLOSED FRACTURE OF RIGHT RADIUS AND ULNA, INITIAL ENCOUNTER: ICD-10-CM

## 2020-09-14 DIAGNOSIS — S52.201A CLOSED FRACTURE OF RIGHT RADIUS AND ULNA, INITIAL ENCOUNTER: Primary | ICD-10-CM

## 2020-09-14 DIAGNOSIS — S52.91XA CLOSED FRACTURE OF RIGHT RADIUS AND ULNA, INITIAL ENCOUNTER: Primary | ICD-10-CM

## 2020-09-14 RX ORDER — OXYCODONE AND ACETAMINOPHEN 10; 325 MG/1; MG/1
1 TABLET ORAL EVERY 6 HOURS PRN
Qty: 40 TABLET | Refills: 0 | Status: SHIPPED | OUTPATIENT
Start: 2020-09-14 | End: 2020-09-24 | Stop reason: ALTCHOICE

## 2020-09-14 RX ORDER — OXYCODONE AND ACETAMINOPHEN 10; 325 MG/1; MG/1
1 TABLET ORAL EVERY 6 HOURS PRN
Qty: 40 TABLET | Refills: 0 | Status: SHIPPED | OUTPATIENT
Start: 2020-09-14 | End: 2020-09-14 | Stop reason: SDUPTHER

## 2020-09-14 RX ORDER — CELECOXIB 50 MG/1
100 CAPSULE ORAL 2 TIMES DAILY
Qty: 90 CAPSULE | Refills: 0 | Status: SHIPPED | OUTPATIENT
Start: 2020-09-14 | End: 2020-11-12

## 2020-09-24 ENCOUNTER — HOSPITAL ENCOUNTER (OUTPATIENT)
Dept: RADIOLOGY | Facility: HOSPITAL | Age: 30
Discharge: HOME | End: 2020-09-24
Attending: ORTHOPAEDIC SURGERY
Payer: COMMERCIAL

## 2020-09-24 ENCOUNTER — OFFICE VISIT (OUTPATIENT)
Dept: ORTHOPEDICS | Facility: CLINIC | Age: 30
End: 2020-09-24
Payer: COMMERCIAL

## 2020-09-24 VITALS — BODY MASS INDEX: 20.99 KG/M2 | HEIGHT: 71 IN | WEIGHT: 149.94 LBS

## 2020-09-24 DIAGNOSIS — S52.201A CLOSED FRACTURE OF RIGHT RADIUS AND ULNA, INITIAL ENCOUNTER: ICD-10-CM

## 2020-09-24 DIAGNOSIS — S52.201A CLOSED FRACTURE OF RIGHT RADIUS AND ULNA, INITIAL ENCOUNTER: Primary | ICD-10-CM

## 2020-09-24 DIAGNOSIS — S52.91XA CLOSED FRACTURE OF RIGHT RADIUS AND ULNA, INITIAL ENCOUNTER: ICD-10-CM

## 2020-09-24 DIAGNOSIS — S52.91XA CLOSED FRACTURE OF RIGHT RADIUS AND ULNA, INITIAL ENCOUNTER: Primary | ICD-10-CM

## 2020-09-24 PROCEDURE — 73100 XR WRIST 2 VIEW RIGHT: ICD-10-PCS | Mod: 26,RT,, | Performed by: RADIOLOGY

## 2020-09-24 PROCEDURE — 73100 X-RAY EXAM OF WRIST: CPT | Mod: 26,RT,, | Performed by: RADIOLOGY

## 2020-09-24 PROCEDURE — 73080 X-RAY EXAM OF ELBOW: CPT | Mod: 26,RT,, | Performed by: RADIOLOGY

## 2020-09-24 PROCEDURE — 99024 POSTOP FOLLOW-UP VISIT: CPT | Mod: S$GLB,,, | Performed by: ORTHOPAEDIC SURGERY

## 2020-09-24 PROCEDURE — 73100 X-RAY EXAM OF WRIST: CPT | Mod: TC,PN,RT

## 2020-09-24 PROCEDURE — 73080 X-RAY EXAM OF ELBOW: CPT | Mod: TC,PN,RT

## 2020-09-24 PROCEDURE — 99999 PR PBB SHADOW E&M-EST. PATIENT-LVL III: CPT | Mod: PBBFAC,,, | Performed by: ORTHOPAEDIC SURGERY

## 2020-09-24 PROCEDURE — 73080 XR ELBOW COMPLETE 3 VIEW RIGHT: ICD-10-PCS | Mod: 26,RT,, | Performed by: RADIOLOGY

## 2020-09-24 PROCEDURE — 99999 PR PBB SHADOW E&M-EST. PATIENT-LVL III: ICD-10-PCS | Mod: PBBFAC,,, | Performed by: ORTHOPAEDIC SURGERY

## 2020-09-24 PROCEDURE — 99024 PR POST-OP FOLLOW-UP VISIT: ICD-10-PCS | Mod: S$GLB,,, | Performed by: ORTHOPAEDIC SURGERY

## 2020-09-24 RX ORDER — OXYCODONE AND ACETAMINOPHEN 7.5; 325 MG/1; MG/1
1 TABLET ORAL EVERY 8 HOURS PRN
Qty: 28 TABLET | Refills: 0 | Status: SHIPPED | OUTPATIENT
Start: 2020-09-24 | End: 2020-11-12
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
## 2020-09-25 ENCOUNTER — HOSPITAL ENCOUNTER (OUTPATIENT)
Dept: RADIOLOGY | Facility: HOSPITAL | Age: 30
Discharge: HOME | End: 2020-09-25
Attending: ORTHOPAEDIC SURGERY
Payer: COMMERCIAL

## 2020-09-25 DIAGNOSIS — S52.201A CLOSED FRACTURE OF RIGHT RADIUS AND ULNA, INITIAL ENCOUNTER: ICD-10-CM

## 2020-09-25 DIAGNOSIS — S52.91XA CLOSED FRACTURE OF RIGHT RADIUS AND ULNA, INITIAL ENCOUNTER: ICD-10-CM

## 2020-09-25 PROCEDURE — 73200 CT WRIST WITHOUT CONTRAST RIGHT: ICD-10-PCS | Mod: 26,RT,, | Performed by: RADIOLOGY

## 2020-09-25 PROCEDURE — 73200 CT UPPER EXTREMITY W/O DYE: CPT | Mod: 26,RT,, | Performed by: RADIOLOGY

## 2020-09-25 PROCEDURE — 73200 CT UPPER EXTREMITY W/O DYE: CPT | Mod: TC,RT

## 2020-09-28 ENCOUNTER — OFFICE VISIT (OUTPATIENT)
Dept: ORTHOPEDICS | Facility: CLINIC | Age: 30
End: 2020-09-28
Payer: COMMERCIAL

## 2020-09-28 VITALS — BODY MASS INDEX: 20.99 KG/M2 | WEIGHT: 149.94 LBS | HEIGHT: 71 IN

## 2020-09-28 DIAGNOSIS — S52.91XD CLOSED FRACTURE OF RIGHT RADIUS AND ULNA WITH ROUTINE HEALING, SUBSEQUENT ENCOUNTER: Primary | ICD-10-CM

## 2020-09-28 DIAGNOSIS — Z98.890 S/P ORIF (OPEN REDUCTION INTERNAL FIXATION) FRACTURE: ICD-10-CM

## 2020-09-28 DIAGNOSIS — Z87.81 S/P ORIF (OPEN REDUCTION INTERNAL FIXATION) FRACTURE: ICD-10-CM

## 2020-09-28 DIAGNOSIS — S52.201D CLOSED FRACTURE OF RIGHT RADIUS AND ULNA WITH ROUTINE HEALING, SUBSEQUENT ENCOUNTER: Primary | ICD-10-CM

## 2020-09-28 PROCEDURE — 99999 PR PBB SHADOW E&M-EST. PATIENT-LVL III: CPT | Mod: PBBFAC,,, | Performed by: ORTHOPAEDIC SURGERY

## 2020-09-28 PROCEDURE — 99999 PR PBB SHADOW E&M-EST. PATIENT-LVL III: ICD-10-PCS | Mod: PBBFAC,,, | Performed by: ORTHOPAEDIC SURGERY

## 2020-09-28 PROCEDURE — 99024 POSTOP FOLLOW-UP VISIT: CPT | Mod: S$GLB,,, | Performed by: ORTHOPAEDIC SURGERY

## 2020-09-28 PROCEDURE — 99024 PR POST-OP FOLLOW-UP VISIT: ICD-10-PCS | Mod: S$GLB,,, | Performed by: ORTHOPAEDIC SURGERY

## 2020-09-28 RX ORDER — GABAPENTIN 300 MG/1
CAPSULE ORAL
COMMUNITY
Start: 2020-08-27 | End: 2020-11-12

## 2020-09-28 RX ORDER — METHOCARBAMOL 500 MG/1
TABLET, FILM COATED ORAL
COMMUNITY
Start: 2020-08-27 | End: 2020-11-12

## 2020-09-28 NOTE — PROGRESS NOTES
Subjective:      Patient ID: Adi Coleman III is a 30 y.o. male.    Chief Complaint: Results (CT )      HPI: Adi Coleman III is here to discuss CT results.  CT was ordered out of concern for ulnar dislocation. Results are negative for subluxation or dislocation. However, there is some dorsal rotation of the ulna and widening of the radioulnar joint. Dr. Leigh reviewed the imaging and recommends the pt continue with regular PO therapy for distal radius ORIF.     Since our last visit less than one week ago, pt reports improvement in ROM elbow and ROM fingers. He has started working on pronation/supination. He has been compliant with the wrist splint. He has also been using the sling.   He also complains of numbness at the ulnar side of the hand and small finger. He noticed this began with the PO splint.     Past Medical History:   Diagnosis Date    Asthma     Closed right radial fracture     Depression     H/O chest tube placement     Rib fracture        Current Outpatient Medications:     celecoxib (CELEBREX) 50 MG capsule, Take 2 capsules (100 mg total) by mouth 2 (two) times daily., Disp: 90 capsule, Rfl: 0    gabapentin (NEURONTIN) 300 MG capsule, TK 1 C PO TID, Disp: , Rfl:     hydrocortisone (ANUSOL-HC) 25 mg suppository, Place 1 suppository (25 mg total) rectally every evening., Disp: 30 suppository, Rfl: 5    hydrOXYzine HCl (ATARAX) 25 MG tablet, Take 2 tablets (50 mg total) by mouth every 6 (six) hours as needed for Anxiety., Disp: 20 tablet, Rfl: 0    mesalamine (CANASA) 1000 MG Supp, Place 1 suppository (1,000 mg total) rectally nightly., Disp: 30 suppository, Rfl: 11    methocarbamoL (ROBAXIN) 500 MG Tab, TK 1 T PO QID FOR 14 DAYS, Disp: , Rfl:     oxyCODONE-acetaminophen (PERCOCET) 7.5-325 mg per tablet, Take 1 tablet by mouth every 8 (eight) hours as needed for Pain., Disp: 28 tablet, Rfl: 0    ondansetron (ZOFRAN) 4 MG tablet, Take 1 tablet (4 mg total) by mouth every 8 (eight) hours as  "needed for Nausea. (Patient not taking: Reported on 9/28/2020), Disp: 12 tablet, Rfl: 0  Review of patient's allergies indicates:  No Known Allergies    Ht 5' 11" (1.803 m)   Wt 68 kg (149 lb 14.6 oz)   BMI 20.91 kg/m²     Review of Systems   Musculoskeletal: Positive for joint pain, joint swelling and stiffness.   Neurological: Positive for numbness.         Objective:    Ortho Exam       RIGHT UE: Well healing volar incision. Mild-moderate wrist swelling. ROM elbow and fingers significantly improved. Still no supination. Decreased sensation small finger and ulnar side wrist.   GEN: Well developed, well nourished male. AAOX3. No acute distress.   Normocephalic, atraumatic.   MITZY  Breathing unlabored.  Mood and affect appropriate.     Assessment:     Imaging: CT results as above.        1. Closed fracture of right radius and ulna with routine healing, subsequent encounter    2. S/P ORIF (open reduction internal fixation) fracture          Plan:           Recommend d/c sling.  Continue gentle ROM elbow and fingers.  Continue full-time splinting.  No lifting or gripping.  Start therapy.   Keep and eye on the numbness.     Follow up in about 3 weeks (around 10/19/2020).          "

## 2020-09-30 ENCOUNTER — CLINICAL SUPPORT (OUTPATIENT)
Dept: REHABILITATION | Facility: HOSPITAL | Age: 30
End: 2020-09-30
Payer: COMMERCIAL

## 2020-09-30 DIAGNOSIS — S52.91XA CLOSED FRACTURE OF RIGHT RADIUS AND ULNA, INITIAL ENCOUNTER: ICD-10-CM

## 2020-09-30 DIAGNOSIS — M25.60 RANGE OF MOTION DEFICIT: ICD-10-CM

## 2020-09-30 DIAGNOSIS — S52.201A CLOSED FRACTURE OF RIGHT RADIUS AND ULNA, INITIAL ENCOUNTER: ICD-10-CM

## 2020-09-30 DIAGNOSIS — M25.531 PAIN IN RIGHT WRIST: ICD-10-CM

## 2020-09-30 PROCEDURE — 97110 THERAPEUTIC EXERCISES: CPT

## 2020-09-30 PROCEDURE — 97165 OT EVAL LOW COMPLEX 30 MIN: CPT
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
## 2020-09-30 NOTE — PATIENT INSTRUCTIONS
"[facility], OCCUPATIONAL THERAPY  HOME EXERCISE PROGRAM     Moist heat 2 x daily for 10 minutes.    Complete the following two massages for 2 minutes, 2x/day:                                                       Complete the following exercises for 10 repetitions, 6 x/day:     AROM: Elbow Flexion / Extension        Bend and straighten elbow       AROM: Supination / Pronation   With your elbow by your side, turn your   palm up then turn your palm down.      AROM: Wrist Flexion / Extension               Bend your wrist forward and back as far as possible.          AROM: Wrist Radial / Ulnar Deviation  Bend your wrist from side to side as far as possible.        AROM: Isolated MCP Flexion / Extension ("Wave")   Bend only your large, bottom knuckles. Hold 3 seconds.   Keep the tips of your fingers straight. Straighten fingers.      AROM: Isolated IPJ Flexion / Extension ("Hook")   Bend only your middle and end knuckles. Hold 3 seconds.   Straighten your fingers.       AROM: MCP and PIP Flexion / Extension ("Straight Fist")  Bend your bottom and middle knuckles, keeping the tips of your fingers straight.   Try to touch the pads of your fingers on your palm. Hold 3 seconds.   Straighten your fingers.       AROM: Composite Flexion / Extension ("Full Fist")  Bend every joint in your hand into a fist. Hold 3 seconds.   Straighten your fingers.         AROM: Composte Extension ("Finger Lifts")  Lift your finger off of the table one at a time. Hold 3 seconds.   Relax your finger.      AROM: Abduction / Adduction  With hand flat on table, spread all fingers apart,   then bring them together as close as possible.      AROM: Thumb IP Flexion / Extension  Brace thumb below tip joint. Bend joint as far as   possible then straighten.      AROM: Composite Flexion   Bend both joints of thumb as far as possible.   Try to touch base of little finger.      AROM: Radial Adduction / Abduction  Place your palm flat on the " "table. Move thumb out   to side. Move back alongside index finger.                                       AROM: Palmar Adduction / Abduction   Rest your small finger on the table. Move thumb   sideways, out and away from   palm. Move back to rest along palm.                        AROM: Composite Movement Circumduction  Make clockwise circles with thumb. Reverse and make counterclockwise   circles   with thumb.                                    AROM: Composite Flesion ("Pinky Slides")  Touch thumb to tip of small finger. Slide thumb down   small finger into palm.         AROM: MP Extension   With palm on table, lift thumb up.   Hold 3 seconds. Relax and lower thumb.      Therapist: SHUBHAM Fernandez, JEN        "

## 2020-10-05 ENCOUNTER — TELEPHONE (OUTPATIENT)
Dept: ORTHOPEDICS | Facility: CLINIC | Age: 30
End: 2020-10-05

## 2020-10-05 NOTE — TELEPHONE ENCOUNTER
Spoke with  Coleman, patient was informed paperwork  Takes about 10-14 business days. Patient express v/u.

## 2020-10-05 NOTE — TELEPHONE ENCOUNTER
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 Please call him to confirm fax information if needed    Would the patient rather a call back or a response via [facility]?  call  Best Call Back Number:  [phone]/self  Additional Information:  His deadline was Friday and was extended until Today

## 2020-10-06 NOTE — PROGRESS NOTES
"  Occupational Therapy Daily Treatment Note     Name: Adi Coleman III  Clinic Number: [id number]    Therapy Diagnosis:   Encounter Diagnoses   Name Primary?    Range of motion deficit     Pain in right wrist      Physician: No ref. provider found    Visit Date: 10/7/2020  Physician Orders: Eval and treat  Medical Diagnosis: S52.91XA,S52.201A (ICD-10-CM) - Closed fracture of right radius and ulna, initial encounter      Surgical Procedure and Date: 9/4/2020 Procedure(s) (LRB):  ORIF, FRACTURE, RADIUS, DISTAL (Right)  Evaluation Date: 9/30/2020  Insurance: [employer]  Insurance Authorization period Expiration: 09/24/2021      Plan of Care Certification Period: 9/30/2020 to 11/30/2020     Visit # / Visits Authortized: 1 / 1  Time In:2:00 pm  Time Out: 2:45 pm  Total Billable Time: 45 minutes     Precautions: Standard      Subjective   4 weeks 5 days post op     Pt reports: "It feels ok I just cant rotate it like I should I don't know if its just cause its stiff or what" My pains not too bad I took some medicine before coming here  he was compliant with home exercise program given last session.   Response to previous treatment:fair  Functional change: none noted    Pain: 4/10  Location: right wrists      Objective   Observation/Appearance:  Joint stiffness and small abrasion noted on dorsum of ulna.     Edema. Measured in centimeters.    9/30/2020 9/30/2020     Left Right   Proximal Wrist [id number] 17 18.5   Mid palm 21.5 20   MCPs 19 19.5      Hand ROM. Measured in degrees.    9/30/2020     Right         Index: DPC 1 cm         Long:  DPC .5 cm         Ring:   DPC .5cm         Small:  DPC .5 cm         Thumb: DPC 4 cm      Wrist AROM  Date 9/30/2020 9/30/2020     Left Right   Supination/Pronation 90/90 -15/50   Wrist ext/flex 65/65 20/10   Wrist RD/UD 20/25 10/0         Sensation:  c/o numbness in small finger         Strength (Dyanmometer) and Pinch Strength (Pinch Gauge)  Measured in pounds and psi. Average of three " trials.    9/30/2020 9/30/2020     Left Right   Rung II def def   Woody Pinch def def   3pt Pinch def def   2pt Pinch def def        Adi received the following supervised modalities after being cleared for contradictions for 10 minutes:   -Paraffin w/ MHP to R hand for 10 min, pre-tx to decrease pain & increase tissue extensibility    Adi received the following manual therapy techniques for 10 minutes:   -Pt received retrograde massage as well as scar massage to decrease edema and stiffness for increased ROM. STM performed to decreased stiffness in surrounding musculature.       Adi received therapeutic exercises for 25 minutes including:  -      Wrist AROM  Flx/ext  RD/UD X 20             Wave  Hook  straight fist, composite fist finger spreads finger lifts    X 20 reps each    Opposition  X 20    Wrist Wheel 2 ways  2 min each way      Isospheres 2 min    Dextraciser 2 min    Coins in hand manipulation  1 tub                    Home Exercises and Education Provided     Education provided:   - HEP in place from eval   - Progress towards goals     Written Home Exercises Provided: Patient instructed to cont prior HEP.  Exercises were reviewed and Adi was able to demonstrate them prior to the end of the session.  Adi demonstrated fair  understanding of the HEP provided.   .   See EMR under Patient Instructions for exercises provided prior visit.        Assessment   4 weeks 5 days post op     Pt would continue to benefit from skilled OT. Pt tolerated first after session well although stated he was unable to complete his exercises. He did well with new added and established exercises. Still very stiff in the wrist specifically with supination and ext. He did respond well during session once he began moving. Educated pt of importance of HEP compliance with good understanding. Future sessions to focus on AROM and upgrades to strengthening via protocol.     Adi is progressing well towards his goals and there are  no updates to goals at this time. Pt prognosis is Fair.     Pt will continue to benefit from skilled outpatient occupational therapy to address the deficits listed in the problem list on initial evaluation provide pt/family education and to maximize pt's level of independence in the home and community environment.     Anticipated barriers to occupational therapy: pain     Pt's spiritual, cultural and educational needs considered and pt agreeable to plan of care and goals.    Goals:  Short Term (4 weeks on 11/1/2020):  1)   Patient to be IND with HEP and modalities for pain management, Progressing 10/7/2020  2)   Increase ROM 10 to 15 degrees for sup/pron and wrist ext/flex  to increase functional hand use for opening doors. Progressing 10/7/2020  3)   Measure /pinch strength at 6 weeks post op. Progressing 10/7/2020  4)   Decrease edema .2-.3 cm to increase joint mobility /flexibility for improved overall functional hand use.  Progressing 10/7/2020  5) Pt will be able to use his right hand to feed and dress himself. Progressing 10/7/2020  6)   Increase AROM to touch base of DPC to increase functional hand use for grasping objects. Progressing 10/7/2020     Long Term (by discharge):  1)   Pt will report 2 out of 10 pain with right hand use. Progressing 10/7/2020  2)   Patient to score at CK or less on FOTO to demonstrate improved perception of functional right hand use. Progressing 10/7/2020  3)   Pt will return to prior level of function for ADLs, fishing and riding his motorcycle. Progressing 10/7/2020      Plan   Continue per initial POC.   Updates/Grading for next session: Progress as tolerated.       Gama Whatley OT

## 2020-10-07 ENCOUNTER — CLINICAL SUPPORT (OUTPATIENT)
Dept: REHABILITATION | Facility: HOSPITAL | Age: 30
End: 2020-10-07
Payer: COMMERCIAL

## 2020-10-07 ENCOUNTER — TELEPHONE (OUTPATIENT)
Dept: ORTHOPEDICS | Facility: CLINIC | Age: 30
End: 2020-10-07

## 2020-10-07 DIAGNOSIS — M25.531 PAIN IN RIGHT WRIST: ICD-10-CM

## 2020-10-07 DIAGNOSIS — M25.60 RANGE OF MOTION DEFICIT: ICD-10-CM

## 2020-10-07 PROCEDURE — 97110 THERAPEUTIC EXERCISES: CPT | Mod: PN

## 2020-10-07 PROCEDURE — 97018 PARAFFIN BATH THERAPY: CPT | Mod: PN

## 2020-10-07 NOTE — TELEPHONE ENCOUNTER
----- Message from Ana Antony sent at 10/7/2020 11:40 AM CDT -----  Regarding: paperwork  Type:  Needs Medical Advice    Who Called: the akhil Montelongo   Reason: patient would like to know the status of medical paperwork that was sent to Doctors office   Would the patient rather a call back or a response via [facility]? Call back   Best Call Back Number: [phone]  Additional Information: please confirm if paperwork was received or if paperwork needs to be resent

## 2020-10-14 ENCOUNTER — CLINICAL SUPPORT (OUTPATIENT)
Dept: REHABILITATION | Facility: HOSPITAL | Age: 30
End: 2020-10-14
Payer: COMMERCIAL

## 2020-10-14 DIAGNOSIS — M25.531 PAIN IN RIGHT WRIST: ICD-10-CM

## 2020-10-14 DIAGNOSIS — M25.60 RANGE OF MOTION DEFICIT: ICD-10-CM

## 2020-10-14 PROCEDURE — 97140 MANUAL THERAPY 1/> REGIONS: CPT | Mod: PN

## 2020-10-14 PROCEDURE — 97110 THERAPEUTIC EXERCISES: CPT | Mod: PN

## 2020-10-14 PROCEDURE — 97022 WHIRLPOOL THERAPY: CPT | Mod: PN

## 2020-10-14 NOTE — PROGRESS NOTES
"  Occupational Therapy Daily Treatment Note     Name: Adi Coleman III  Clinic Number: [id number]    Therapy Diagnosis:   Encounter Diagnoses   Name Primary?    Range of motion deficit     Pain in right wrist      Physician: Antonieta Aguero PA-C    Visit Date: 10/14/2020  Physician Orders: Eval and treat  Medical Diagnosis: S52.91XA,S52.201A (ICD-10-CM) - Closed fracture of right radius and ulna, initial encounter      Surgical Procedure and Date: 9/4/2020 Procedure(s) (LRB):  ORIF, FRACTURE, RADIUS, DISTAL (Right)  Evaluation Date: 9/30/2020  Insurance: [employer]  Insurance Authorization period Expiration: 09/24/2021      Plan of Care Certification Period: 9/30/2020 to 11/30/2020     Visit # / Visits Authortized: 2/ 24  Time In: 2:15 pm  Time Out: 03.02 PM   Total Billable Time: 47 minutes     Precautions: Standard      Subjective   5 weeks 5 days post op     Pt reports:" I am not doing exercises at home as much as I should be doing. Probably why I am more stiff. I try to do exercises while I'm in the hot shower. I've been nervous to use my hand because I'm scared I'm going to mess something up"    he wascompliant with home exercise program given last session.   Response to previous treatment:fair  Functional change: none noted    Pain: 4/10  Location: right wrists      Objective   Observation/Appearance:  Joint stiffness and small abrasion noted on dorsum of ulna.     Edema. Measured in centimeters.    9/30/2020 9/30/2020     Left Right   Proximal Wrist [id number] 17 18.5   Mid palm 21.5 20   MCPs 19 19.5      Hand ROM. Measured in degrees.    9/30/2020     Right         Index: DPC 1 cm         Long:  DPC .5 cm         Ring:   DPC .5cm         Small:  DPC .5 cm         Thumb: DPC 4 cm      Wrist AROM  Date 9/30/2020 9/30/2020     Left Right   Supination/Pronation 90/90 -15/50   Wrist ext/flex 65/65 20/10   Wrist RD/UD 20/25 10/0         Sensation:  c/o numbness in small finger         Strength (Dyanmometer) and " Pinch Strength (Pinch Gauge)  Measured in pounds and psi. Average of three trials.    9/30/2020 9/30/2020     Left Right   Rung II def def   Woody Pinch def def   3pt Pinch def def   2pt Pinch def def        Adi received the following supervised modalities after being cleared for contradictions for 10 minutes:   - fluidotherapy ~ 12 minutes to promote tissue extensibility and decrease sensitization near incision     Adi received the following manual therapy techniques for 12 minutes:   -Pt received retrograde massage as well as scar massage to decrease edema and stiffness for increased ROM. STM performed to decreased stiffness in surrounding musculature.       Adi received therapeutic exercises for 25 minutes including:  -      Wrist AROM  Flx/ext  RD/UD 3 sets of 10           Wave  Hook  straight fist, composite fist finger spreads finger lifts    X 20 reps each    Opposition  X 20    Wrist Wheel 2 ways  2 min each way      Isospheres 2 min (not performed this session)   Dextraciser 2 min  (not performed this session)   Coins in hand manipulation  1 tub ( 3/release 3)       Home Exercises and Education Provided     Education provided:   - Educated pt on importance of HEP to decrease stiffness and pain   - HEP in place from eval   - Progress towards goals     Written Home Exercises Provided: Patient instructed to cont prior HEP.  Exercises were reviewed and Adi was able to demonstrate them prior to the end of the session.  Adi demonstrated fair  understanding of the HEP provided.   .   See EMR under Patient Instructions for exercises provided prior visit.        Assessment   5 weeks 5 days post op     Pt would continue to benefit from skilled OT. Pt cont to display severe limitation with wrist AROM in all planes of movement. Demonstrates increased ROM in wrist flexion/extension following AROM exercises. Noted ulnar sided discomfort during sup/pronation. Relieved by extending arm along side with  minimal wrist extension stretch. Able to perform palmar translation with coins to promote increased functional opposition. Tx will cont to focus on improving ROM. Will progress as kathryn.    Adi is progressing well towards his goals and there are no updates to goals at this time. Pt prognosis is Fair.     Pt will continue to benefit from skilled outpatient occupational therapy to address the deficits listed in the problem list on initial evaluation provide pt/family education and to maximize pt's level of independence in the home and community environment.     Anticipated barriers to occupational therapy: pain and stiffness    Pt's spiritual, cultural and educational needs considered and pt agreeable to plan of care and goals.    Goals:  Short Term (4 weeks on 11/1/2020):  1)   Patient to be IND with HEP and modalities for pain management, Progressing 10/14/2020  2)   Increase ROM 10 to 15 degrees for sup/pron and wrist ext/flex  to increase functional hand use for opening doors. Progressing 10/14/2020  3)   Measure /pinch strength at 6 weeks post op. Progressing 10/14/2020  4)   Decrease edema .2-.3 cm to increase joint mobility /flexibility for improved overall functional hand use.  Progressing 10/14/2020  5) Pt will be able to use his right hand to feed and dress himself. Progressing 10/14/2020  6)   Increase AROM to touch base of DPC to increase functional hand use for grasping objects. Progressing 10/14/2020     Long Term (by discharge):  1)   Pt will report 2 out of 10 pain with right hand use. Progressing 10/14/2020  2)   Patient to score at CK or less on FOTO to demonstrate improved perception of functional right hand use. Progressing 10/14/2020  3)   Pt will return to prior level of function for ADLs, fishing and riding his motorcycle. Progressing 10/14/2020      Plan   Continue per initial POC.   Updates/Grading for next session: Progress as tolerated.       Layne Barnes, OT

## 2020-10-16 ENCOUNTER — TELEPHONE (OUTPATIENT)
Dept: NEUROSURGERY | Facility: CLINIC | Age: 30
End: 2020-10-16

## 2020-10-16 NOTE — TELEPHONE ENCOUNTER
----- Message from Enedelia Babin sent at 10/16/2020 12:21 PM CDT -----  Type:  Patient Returning Call    Who Called:Tressa pt job   Who Left Message for Patient: pt job   Does the patient know what this is regarding?: pt work is calling about pt FMLA   Would the patient rather a call back or a response via [facility]?  Call   Best Call Back Number:[phone]  Additional Information:  call back

## 2020-10-19 ENCOUNTER — HOSPITAL ENCOUNTER (OUTPATIENT)
Dept: RADIOLOGY | Facility: HOSPITAL | Age: 30
Discharge: HOME | End: 2020-10-19
Attending: ORTHOPAEDIC SURGERY
Payer: COMMERCIAL

## 2020-10-19 ENCOUNTER — OFFICE VISIT (OUTPATIENT)
Dept: ORTHOPEDICS | Facility: CLINIC | Age: 30
End: 2020-10-19
Payer: COMMERCIAL

## 2020-10-19 VITALS — WEIGHT: 149.94 LBS | HEIGHT: 71 IN | BODY MASS INDEX: 20.99 KG/M2

## 2020-10-19 DIAGNOSIS — Z98.890 S/P ORIF (OPEN REDUCTION INTERNAL FIXATION) FRACTURE: ICD-10-CM

## 2020-10-19 DIAGNOSIS — Z87.81 S/P ORIF (OPEN REDUCTION INTERNAL FIXATION) FRACTURE: Primary | ICD-10-CM

## 2020-10-19 DIAGNOSIS — S52.201D CLOSED FRACTURE OF RIGHT RADIUS AND ULNA WITH ROUTINE HEALING, SUBSEQUENT ENCOUNTER: ICD-10-CM

## 2020-10-19 DIAGNOSIS — Z98.890 S/P ORIF (OPEN REDUCTION INTERNAL FIXATION) FRACTURE: Primary | ICD-10-CM

## 2020-10-19 DIAGNOSIS — Z87.81 S/P ORIF (OPEN REDUCTION INTERNAL FIXATION) FRACTURE: ICD-10-CM

## 2020-10-19 DIAGNOSIS — S52.91XD CLOSED FRACTURE OF RIGHT RADIUS AND ULNA WITH ROUTINE HEALING, SUBSEQUENT ENCOUNTER: ICD-10-CM

## 2020-10-19 PROCEDURE — 73100 X-RAY EXAM OF WRIST: CPT | Mod: TC,PN,RT

## 2020-10-19 PROCEDURE — 99999 PR PBB SHADOW E&M-EST. PATIENT-LVL III: ICD-10-PCS | Mod: PBBFAC,,, | Performed by: ORTHOPAEDIC SURGERY

## 2020-10-19 PROCEDURE — 99024 POSTOP FOLLOW-UP VISIT: CPT | Mod: S$GLB,,, | Performed by: ORTHOPAEDIC SURGERY

## 2020-10-19 PROCEDURE — 73100 XR WRIST 2 VIEW RIGHT: ICD-10-PCS | Mod: 26,RT,, | Performed by: RADIOLOGY

## 2020-10-19 PROCEDURE — 99024 PR POST-OP FOLLOW-UP VISIT: ICD-10-PCS | Mod: S$GLB,,, | Performed by: ORTHOPAEDIC SURGERY

## 2020-10-19 PROCEDURE — 73100 X-RAY EXAM OF WRIST: CPT | Mod: 26,RT,, | Performed by: RADIOLOGY

## 2020-10-19 PROCEDURE — 99999 PR PBB SHADOW E&M-EST. PATIENT-LVL III: CPT | Mod: PBBFAC,,, | Performed by: ORTHOPAEDIC SURGERY

## 2020-10-19 RX ORDER — HYDROCODONE BITARTRATE AND ACETAMINOPHEN 5; 325 MG/1; MG/1
1 TABLET ORAL
Qty: 21 TABLET | Refills: 0 | Status: SHIPPED | OUTPATIENT
Start: 2020-10-19 | End: 2020-11-09

## 2020-10-19 NOTE — PROGRESS NOTES
Subjective:      Patient ID: Adi Coleman III is a 30 y.o. male.    Chief Complaint: Arm Injury (right ) and Follow-up (3 week )      HPI: Adi Coleman III is here for a PO visit. He is 6 weeks s/p ORIF right distal radius fracture. He has been in the removable splint mostly full time. Pain is controlled with pain medication. He has started therapy and has seen improvement but has had to cancel a few times. Pain is mostly over the ulnar side of the wrist.     Past Medical History:   Diagnosis Date    Asthma     Closed right radial fracture     Depression     H/O chest tube placement     Rib fracture        Current Outpatient Medications:     celecoxib (CELEBREX) 50 MG capsule, Take 2 capsules (100 mg total) by mouth 2 (two) times daily., Disp: 90 capsule, Rfl: 0    gabapentin (NEURONTIN) 300 MG capsule, TK 1 C PO TID, Disp: , Rfl:     hydrOXYzine HCl (ATARAX) 25 MG tablet, Take 2 tablets (50 mg total) by mouth every 6 (six) hours as needed for Anxiety., Disp: 20 tablet, Rfl: 0    mesalamine (CANASA) 1000 MG Supp, Place 1 suppository (1,000 mg total) rectally nightly., Disp: 30 suppository, Rfl: 11    methocarbamoL (ROBAXIN) 500 MG Tab, TK 1 T PO QID FOR 14 DAYS, Disp: , Rfl:     HYDROcodone-acetaminophen (NORCO) 5-325 mg per tablet, Take 1 tablet by mouth every 24 hours as needed for Pain., Disp: 21 tablet, Rfl: 0    hydrocortisone (ANUSOL-HC) 25 mg suppository, Place 1 suppository (25 mg total) rectally every evening. (Patient not taking: Reported on 10/19/2020), Disp: 30 suppository, Rfl: 5    ondansetron (ZOFRAN) 4 MG tablet, Take 1 tablet (4 mg total) by mouth every 8 (eight) hours as needed for Nausea. (Patient not taking: Reported on 10/19/2020), Disp: 12 tablet, Rfl: 0    oxyCODONE-acetaminophen (PERCOCET) 7.5-325 mg per tablet, Take 1 tablet by mouth every 8 (eight) hours as needed for Pain. (Patient not taking: Reported on 10/19/2020), Disp: 28 tablet, Rfl: 0  Review of patient's allergies  "indicates:  No Known Allergies    Ht 5' 11" (1.803 m)   Wt 68 kg (149 lb 14.6 oz)   BMI 20.91 kg/m²     Review of Systems   Constitution: Negative for chills and fever.   Cardiovascular: Negative for chest pain and palpitations.   Respiratory: Negative for shortness of breath and wheezing.    Skin: Negative for poor wound healing and rash.   Musculoskeletal: Positive for joint pain and stiffness. Negative for joint swelling.   Gastrointestinal: Negative for nausea and vomiting.   Genitourinary: Negative for dysuria and hematuria.   Neurological: Negative for numbness, paresthesias, seizures and tremors.   Psychiatric/Behavioral: Negative for altered mental status.   Allergic/Immunologic: Negative for environmental allergies and persistent infections.         Objective:    Ortho Exam       Right wrist:  Well-healed volar incision.  No significant swelling.  Minimal pronation supination, minimal extension flexion.  Range of motion fingers full.  Sensation intact.  Pulses present.  Cap refill brisk  GEN: Well developed, well nourished male. AAOX3. No acute distress.   Normocephalic, atraumatic.   MITZY  Breathing unlabored.  Mood and affect appropriate.     Assessment:     Imaging:  Status post ORIF.  Fracture is well reduced.  No evidence hardware failure        1. S/P ORIF (open reduction internal fixation) fracture          Plan:       Encouraged the patient to discontinue splint for activities and increase activites. No heavy lifting.   Continue therapy.   Pain med refill.    Orders Placed This Encounter    HYDROcodone-acetaminophen (NORCO) 5-325 mg per tablet     Follow up in about 4 weeks (around 11/16/2020).          "

## 2020-10-21 ENCOUNTER — CLINICAL SUPPORT (OUTPATIENT)
Dept: REHABILITATION | Facility: HOSPITAL | Age: 30
End: 2020-10-21
Payer: COMMERCIAL

## 2020-10-21 DIAGNOSIS — M25.531 PAIN IN RIGHT WRIST: ICD-10-CM

## 2020-10-21 DIAGNOSIS — M25.60 RANGE OF MOTION DEFICIT: Primary | ICD-10-CM

## 2020-10-21 PROCEDURE — 97140 MANUAL THERAPY 1/> REGIONS: CPT | Mod: PN

## 2020-10-21 PROCEDURE — 97110 THERAPEUTIC EXERCISES: CPT | Mod: PN

## 2020-10-21 PROCEDURE — 97018 PARAFFIN BATH THERAPY: CPT | Mod: PN

## 2020-10-21 NOTE — PROGRESS NOTES
"  Occupational Therapy Daily Treatment Note     Name: Adi Coleman III  Clinic Number: [id number]    Therapy Diagnosis:   Encounter Diagnoses   Name Primary?    Range of motion deficit Yes    Pain in right wrist      Physician: Antonieta Aguero PA-C    Visit Date: 10/21/2020  Physician Orders: Eval and treat  Medical Diagnosis: S52.91XA,S52.201A (ICD-10-CM) - Closed fracture of right radius and ulna, initial encounter      Surgical Procedure and Date: 9/4/2020 Procedure(s) (LRB):  ORIF, FRACTURE, RADIUS, DISTAL (Right)  Evaluation Date: 9/30/2020  Insurance: [employer]  Insurance Authorization period Expiration: 09/24/2021      Plan of Care Certification Period: 9/30/2020 to 11/30/2020     Visit # / Visits Authortized: 2/ 24  Time In: 2:15 pm  Time Out: 03.02 PM   Total Billable Time: 47 minutes     Precautions: Standard      Subjective   7 weeks 5 days post op     Pt reports: Pt reported him going to the doctor's on Monday and he was told if he does not start moving his arm more it will not improve"    he wascompliant with home exercise program given last session.   Response to previous treatment:fair  Functional change: none noted    Pain: 4/10  Location: right wrist- ulnar styloid     Objective   Observation/Appearance:  Joint stiffness and small abrasion noted on dorsum of ulna.     Edema. Measured in centimeters.    9/30/2020 9/30/2020     Left Right   Proximal Wrist [id number] 17 18.5   Mid palm 21.5 20   MCPs 19 19.5      Hand ROM. Measured in degrees.    9/30/2020     Right         Index: DPC 1 cm         Long:  DPC .5 cm         Ring:   DPC .5cm         Small:  DPC .5 cm         Thumb: DPC 4 cm      Wrist AROM  Date 9/30/2020 9/30/2020     Left Right   Supination/Pronation 90/90 -15/50   Wrist ext/flex 65/65 20/10   Wrist RD/UD 20/25 10/0         Sensation:  c/o numbness in small finger         Strength (Dyanmometer) and Pinch Strength (Pinch Gauge)  Measured in pounds and psi. Average of three trials.    " 9/30/2020 9/30/2020     Left Right   Rung II def def   Woody Pinch def def   3pt Pinch def def   2pt Pinch def def        Adi received the following supervised modalities after being cleared for contradictions for 10 minutes:   - parrafin pre treatment to promote tissue extensibility and increased blood flow to aid in increased ROM    Adi received the following manual therapy techniques for 12 minutes:   -Pt received retrograde massage as well as scar massage to decrease edema and stiffness for increased ROM. STM performed to decreased stiffness in surrounding musculature. Vibrating massager used near incision to promote decreased scar sensitivity in order to allow toleration for movement      Adi received therapeutic exercises for 25 minutes including:  -      Wrist AROM  Flx/ext  RD/UD 3 sets of 10   PROM wrist flex/ext  x 10 hold for 5s        Wave  Hook  straight fist, composite fist finger spreads finger lifts    X 20 reps each    Opposition  X 20    Hammer supination/pronation 2 min each way      Isospheres 2 min (not performed this session)   Dextraciser 2 min  (not performed this session)   Coins in hand manipulation  1 tub ( 3/release 3)       Home Exercises and Education Provided     Education provided:   - Educated pt on importance of HEP to decrease stiffness and pain   - HEP in place from eval   - Progress towards goals     Written Home Exercises Provided: Patient instructed to cont prior HEP.  Exercises were reviewed and Adi was able to demonstrate them prior to the end of the session.  Adi demonstrated fair  understanding of the HEP provided.   .   See EMR under Patient Instructions for exercises provided prior visit.        Assessment   7 weeks 5 days post op     Pt would continue to benefit from skilled OT. Pt cont to display severe limitation with wrist AROM in all planes of movement. Demonstrates increased ROM in wrist flexion/extension following AROM exercises. Cont to have ulnar  sided discomfort during sup/pronation. Relieved by extending arm along side with minimal wrist extension stretch. Able to perform palmar translation with coins to promote increased functional opposition. Tx will cont to focus on improving ROM. Will progress as kathryn.     Adi is progressing well towards his goals and there are no updates to goals at this time. Pt prognosis is Fair.     Pt will continue to benefit from skilled outpatient occupational therapy to address the deficits listed in the problem list on initial evaluation provide pt/family education and to maximize pt's level of independence in the home and community environment.     Anticipated barriers to occupational therapy: pain and stiffness    Pt's spiritual, cultural and educational needs considered and pt agreeable to plan of care and goals.    Goals:  Short Term (4 weeks on 11/1/2020):  1)   Patient to be IND with HEP and modalities for pain management, Progressing 10/21/2020  2)   Increase ROM 10 to 15 degrees for sup/pron and wrist ext/flex  to increase functional hand use for opening doors. Progressing 10/21/2020  3)   Measure /pinch strength at 6 weeks post op. Progressing 10/21/2020  4)   Decrease edema .2-.3 cm to increase joint mobility /flexibility for improved overall functional hand use.  Progressing 10/21/2020  5) Pt will be able to use his right hand to feed and dress himself. Progressing 10/21/2020  6)   Increase AROM to touch base of DPC to increase functional hand use for grasping objects. Progressing 10/21/2020     Long Term (by discharge):  1)   Pt will report 2 out of 10 pain with right hand use. Progressing 10/21/2020  2)   Patient to score at CK or less on FOTO to demonstrate improved perception of functional right hand use. Progressing 10/21/2020  3)   Pt will return to prior level of function for ADLs, fishing and riding his motorcycle. Progressing 10/21/2020      Plan   Continue per initial POC.   Updates/Grading for next  "session: Progress as tolerated.     Layne Bermudez OTR/L  [facility], OCCUPATIONAL THERAPY  HOME EXERCISE PROGRAM      Moist heat 2 x daily for 10 minutes.     Complete the following two massages for 2 minutes, 2x/day:                                                        Complete the following exercises for 10 repetitions, 6 x/day:      AROM: Elbow Flexion / Extension        Bend and straighten elbow         AROM: Supination / Pronation   With your elbow by your side, turn your   palm up then turn your palm down.        AROM: Wrist Flexion / Extension               Bend your wrist forward and back as far as possible.             AROM: Wrist Radial / Ulnar Deviation  Bend your wrist from side to side as far as possible.           AROM: Isolated MCP Flexion / Extension ("Wave")   Bend only your large, bottom knuckles. Hold 3 seconds.   Keep the tips of your fingers straight. Straighten fingers.        AROM: Isolated IPJ Flexion / Extension ("Hook")   Bend only your middle and end knuckles. Hold 3 seconds.   Straighten your fingers.         AROM: MCP and PIP Flexion / Extension ("Straight Fist")  Bend your bottom and middle knuckles, keeping the tips of your fingers straight.   Try to touch the pads of your fingers on your palm. Hold 3 seconds.   Straighten your fingers.         AROM: Composite Flexion / Extension ("Full Fist")  Bend every joint in your hand into a fist. Hold 3 seconds.   Straighten your fingers.            AROM: Composte Extension ("Finger Lifts")  Lift your finger off of the table one at a time. Hold 3 seconds.   Relax your finger.        AROM: Abduction / Adduction  With hand flat on table, spread all fingers apart,   then bring them together as close as possible.        AROM: Thumb IP Flexion / Extension  Brace thumb below tip joint. Bend joint as far as   possible then straighten.        AROM: Composite Flexion   Bend both joints of thumb as far as possible.   Try to touch " "base of little finger.        AROM: Radial Adduction / Abduction  Place your palm flat on the table. Move thumb out   to side. Move back alongside index finger.                                        AROM: Palmar Adduction / Abduction   Rest your small finger on the table. Move thumb   sideways, out and away from   palm. Move back to rest along palm.                        AROM: Composite Movement Circumduction  Make clockwise circles with thumb. Reverse and make counterclockwise   circles   with thumb.                                     AROM: Composite Flesion ("Pinky Slides")  Touch thumb to tip of small finger. Slide thumb down   small finger into palm.            AROM: MP Extension   With palm on table, lift thumb up.   Hold 3 seconds. Relax and lower thumb.          "

## 2020-10-23 ENCOUNTER — CLINICAL SUPPORT (OUTPATIENT)
Dept: REHABILITATION | Facility: HOSPITAL | Age: 30
End: 2020-10-23
Payer: COMMERCIAL

## 2020-10-23 DIAGNOSIS — M25.531 PAIN IN RIGHT WRIST: ICD-10-CM

## 2020-10-23 DIAGNOSIS — M25.60 RANGE OF MOTION DEFICIT: ICD-10-CM

## 2020-10-23 PROCEDURE — 97110 THERAPEUTIC EXERCISES: CPT | Mod: PN

## 2020-10-23 PROCEDURE — 97140 MANUAL THERAPY 1/> REGIONS: CPT | Mod: PN

## 2020-10-23 NOTE — PROGRESS NOTES
"  Occupational Therapy Daily Treatment Note     Name: Adi HELMS Coleman III  Clinic Number: [id number]    Therapy Diagnosis:   Encounter Diagnoses   Name Primary?    Range of motion deficit     Pain in right wrist      Physician: Antonieta Aguero PA-C    Visit Date: 10/23/2020  Physician Orders: Eval and treat  Medical Diagnosis: S52.91XA,S52.201A (ICD-10-CM) - Closed fracture of right radius and ulna, initial encounter      Surgical Procedure and Date: 9/4/2020 Procedure(s) (LRB):  ORIF, FRACTURE, RADIUS, DISTAL (Right)  Evaluation Date: 9/30/2020  Insurance: [employer]  Insurance Authorization period Expiration: 09/24/2021      Plan of Care Certification Period: 9/30/2020 to 11/30/2020     Visit # / Visits Authortized: 4/ 24  Time In: 12:10 PM  Time Out: 12:51 PM  Total Billable Time: 41 minutes     Precautions: Standard      Subjective   8 weeks post op     Pt reports:"Whenever I do this (ulnar deviation) it hurts" he wascompliant with home exercise program given last session.     Response to previous treatment:fair  Functional change: none noted    Pain: 4/10  Location: right wrists      Objective   Observation/Appearance:  Joint stiffness and small abrasion noted on dorsum of ulna.    Adi received the following supervised modalities after being cleared for contradictions for 8 minutes:   - paraffin and MHP to wrist    Adi received the following manual therapy techniques for 10 minutes:   -Pt received retrograde massage as well as scar massage to decrease edema and stiffness for increased ROM. STM performed to decreased stiffness in surrounding musculature. Astym tools and point relief massager also used.    Adi received therapeutic exercises for 23 minutes including:  -  Prayer stretch 3/30"   Wrist AROM  Flx/ext  RD/UD 3 sets of 10   Wave  Hook  straight fist, composite fist finger spreads finger lifts    X 20 reps each    Opposition  X 20    Wrist Wheel 2 ways  2 min each way      Supination hammer 2/10 "   Isospheres 1 min    Dextraciser 2 min     Yellow theraputty        Home Exercises and Education Provided     Education provided:   - HEP in place from eval   - Progress towards goals     Written Home Exercises Provided: Patient instructed to cont prior HEP.  Exercises were reviewed and Adi was able to demonstrate them prior to the end of the session.  Adi demonstrated fair  understanding of the HEP provided.   .   See EMR under Patient Instructions for exercises provided prior visit.        Assessment   8 weeks post op    Pt tolerated session well today with similar limitations from last session. Less than full ROM still with supination and wrist flexion/extension. Pain is also main limitation. Added light strengthening exercise today, and yellow theraputty was provided to continue strengthening at home. Pt remains motivated. Next session to continue to focus on improving ROM to participate in ADLs and IADLs.    Adi is progressing well towards his goals and there are no updates to goals at this time. Pt prognosis is Fair.     Pt will continue to benefit from skilled outpatient occupational therapy to address the deficits listed in the problem list on initial evaluation provide pt/family education and to maximize pt's level of independence in the home and community environment.     Anticipated barriers to occupational therapy: pain and stiffness    Pt's spiritual, cultural and educational needs considered and pt agreeable to plan of care and goals.    Goals:  Short Term (4 weeks on 11/1/2020):  1)   Patient to be IND with HEP and modalities for pain management, Progressing 10/23/2020  2)   Increase ROM 10 to 15 degrees for sup/pron and wrist ext/flex  to increase functional hand use for opening doors. Progressing 10/23/2020  3)   Measure /pinch strength at 6 weeks post op. Progressing 10/23/2020  4)   Decrease edema .2-.3 cm to increase joint mobility /flexibility for improved overall functional hand  use.  Progressing 10/23/2020  5)   Pt will be able to use his right hand to feed and dress himself. Progressing 10/23/2020  6)   Increase AROM to touch base of DPC to increase functional hand use for grasping objects. Progressing 10/23/2020     Long Term (by discharge):  1)   Pt will report 2 out of 10 pain with right hand use. Progressing 10/23/2020  2)   Patient to score at CK or less on FOTO to demonstrate improved perception of functional right hand use. Progressing 10/23/2020  3)   Pt will return to prior level of function for ADLs, fishing and riding his motorcycle. Progressing 10/23/2020      Plan   Continue per initial POC.   Updates/Grading for next session: Progress as tolerated.       JOSE RAFAEL AGUERO, OT

## 2020-10-26 ENCOUNTER — CLINICAL SUPPORT (OUTPATIENT)
Dept: REHABILITATION | Facility: HOSPITAL | Age: 30
End: 2020-10-26
Payer: COMMERCIAL

## 2020-10-26 DIAGNOSIS — M25.531 PAIN IN RIGHT WRIST: ICD-10-CM

## 2020-10-26 DIAGNOSIS — M25.60 RANGE OF MOTION DEFICIT: Primary | ICD-10-CM

## 2020-10-26 PROCEDURE — 97110 THERAPEUTIC EXERCISES: CPT | Mod: PN

## 2020-10-26 PROCEDURE — 97022 WHIRLPOOL THERAPY: CPT | Mod: PN

## 2020-10-26 PROCEDURE — 97140 MANUAL THERAPY 1/> REGIONS: CPT | Mod: PN

## 2020-11-02 ENCOUNTER — CLINICAL SUPPORT (OUTPATIENT)
Dept: REHABILITATION | Facility: HOSPITAL | Age: 30
End: 2020-11-02
Payer: COMMERCIAL

## 2020-11-02 DIAGNOSIS — M25.60 RANGE OF MOTION DEFICIT: Primary | ICD-10-CM

## 2020-11-02 DIAGNOSIS — M25.531 PAIN IN RIGHT WRIST: ICD-10-CM

## 2020-11-02 PROCEDURE — 97110 THERAPEUTIC EXERCISES: CPT | Mod: PN

## 2020-11-02 PROCEDURE — 97022 WHIRLPOOL THERAPY: CPT | Mod: PN

## 2020-11-02 NOTE — PROGRESS NOTES
"    Occupational Therapy Daily Treatment Note     Name: Adi Coleman [facility]  Clinic Number: [id number]    Therapy Diagnosis:   Encounter Diagnoses   Name Primary?    Range of motion deficit Yes    Pain in right wrist      Physician: Antonieta Aguero PA-C    Visit Date: 11/2/2020  Physician Orders: Eval and treat  Medical Diagnosis: S52.91XA,S52.201A (ICD-10-CM) - Closed fracture of right radius and ulna, initial encounter      Surgical Procedure and Date: 9/4/2020 Procedure(s) (LRB):  ORIF, FRACTURE, RADIUS, DISTAL (Right)  Evaluation Date: 9/30/2020  Insurance: [employer]  Insurance Authorization period Expiration: 09/24/2021      Plan of Care Certification Period: 9/30/2020 to 11/30/2020     Visit # / Visits Authortized: 6/ 24  Time In: 02:28 PM  Time Out: 03: 15 PM  Total Billable Time: 47 minutes     Precautions: Standard      Subjective   8 weeks post op     Pt reports: "I was a little sore after last time but the next day I did not feel as tight in my hand"    he wascompliant with home exercise program given last session.     Response to previous treatment: good  Functional change: able to use hand when shifting gears     Pain: 2/10  Location: right wrist     Objective   Observation/Appearance:  no swelling noted, minimal abrasion on ulnar styloid     Adi received the following supervised modalities after being cleared for contradictions for 12 minutes:   - fluidotherapy ~12 minutes to promote decreased pain/edema and increased blood flow to allow increase in ROM      Adi received therapeutic exercises for 35 minutes including:  - supination stretch   Prayer stretch 3/30"   Wrist AROM  Flx/ext  RD/UD 3 sets of 10 (flex/ext)  2 min (UD/RD)   Wave  Hook  straight fist  finger spreads finger lifts    X 20 reps each (performed in fluido)   Opposition  X 20    Wrist Wheel 2 ways  2 min each way (not performed this session)     Supination hammer  2 min   Isospheres 1 min    Dextraciser 2 min     Yellow theraputty "  and WBing (3 min)     Wrist AROM  Date 9/30/2020 9/30/2020 11/2/2020     Left Right Right   Supination/Pronation 90/90 -15/50 58/65   Wrist ext/flex 65/65 20/10 44/25   Wrist RD/UD 20/25 10/0 15/15       Home Exercises and Education Provided     Education provided:   - HEP in place from eval   - Progress towards goals     Written Home Exercises Provided: Patient instructed to cont prior HEP.  Exercises were reviewed and Adi was able to demonstrate them prior to the end of the session.  Adi demonstrated fair  understanding of the HEP provided.   .   See EMR under Patient Instructions for exercises provided prior visit.        Assessment   8 weeks post op    Pt able to better toleration wrist AROM this tx session. Cont to require verbal cue for slow and controlled movements. DRUJ instability and ulnar styloid associated pain cont to be biggest limiting factor in progressing. Pt salazar from [facility] re[facility] to help stabilize ulnar side to increase toleration to tasks.  Measurements were updated this tx session and pt increased in all planes ~10-15 degrees.  Tx will cont to improve ROM, decrease pain, and increase strength.    Adi is progressing well towards his goals and there are no updates to goals at this time. Pt prognosis is Fair.     Pt will continue to benefit from skilled outpatient occupational therapy to address the deficits listed in the problem list on initial evaluation provide pt/family education and to maximize pt's level of independence in the home and community environment.     Anticipated barriers to occupational therapy: pain and stiffness    Pt's spiritual, cultural and educational needs considered and pt agreeable to plan of care and goals.    Goals:  Short Term (4 weeks on 11/1/2020):  1)   Patient to be IND with HEP and modalities for pain management, MET 11/2/2020  2)   Increase ROM 10 to 15 degrees for sup/pron and wrist ext/flex  to increase functional hand use for opening doors. MET  11/2/2020  3)   Measure /pinch strength at 6 weeks post op. Progressing 11/2/2020  4)   Decrease edema .2-.3 cm to increase joint mobility /flexibility for improved overall functional hand use.  Progressing 11/2/2020  5)   Pt will be able to use his right hand to feed and dress himself. MET 11/2/2020  6)   Increase AROM to touch base of DPC to increase functional hand use for grasping objects. MET 11/2/2020     Long Term (by discharge):  1)   Pt will report 2 out of 10 pain with right hand use. Progressing 11/2/2020  2)   Patient to score at CK or less on FOTO to demonstrate improved perception of functional right hand use. Progressing 11/2/2020  3)   Pt will return to prior level of function for ADLs, fishing and riding his motorcycle. Progressing 11/2/2020      Plan   Continue per initial POC.   Updates/Grading for next session: Progress as tolerated.       Layne Bermudez OTR/L

## 2020-11-04 ENCOUNTER — CLINICAL SUPPORT (OUTPATIENT)
Dept: REHABILITATION | Facility: HOSPITAL | Age: 30
End: 2020-11-04
Payer: COMMERCIAL

## 2020-11-04 DIAGNOSIS — M25.531 PAIN IN RIGHT WRIST: ICD-10-CM

## 2020-11-04 DIAGNOSIS — M25.60 RANGE OF MOTION DEFICIT: ICD-10-CM

## 2020-11-04 PROCEDURE — 97022 WHIRLPOOL THERAPY: CPT | Mod: PN

## 2020-11-04 PROCEDURE — 97140 MANUAL THERAPY 1/> REGIONS: CPT | Mod: PN

## 2020-11-04 PROCEDURE — 97110 THERAPEUTIC EXERCISES: CPT | Mod: PN

## 2020-11-04 NOTE — PROGRESS NOTES
"    Occupational Therapy Daily Treatment Note     [facility]  [facility] [id number]    Therapy Diagnosis:   Encounter Diagnoses   Name Primary?    Range of motion deficit     Pain in right wrist      Physician: Antonieta Aguero PA-C    Visit Date: 11/4/2020  Physician Orders: Eval and treat  Medical Diagnosis: S52.91XA,S52.201A (ICD-10-CM) - Closed fracture of right radius and ulna, initial encounter      Surgical Procedure and Date: 9/4/2020 Procedure(s) (LRB):  ORIF, FRACTURE, RADIUS, DISTAL (Right)  Evaluation Date: 9/30/2020  Insurance: [employer]  Insurance Authorization period Expiration: 09/24/2021   RTD: 11/30/2020     Plan of Care Certification Period: 9/30/2020 to 11/30/2020     Visit # / Visits Authortized: 7/ 24  Time In: 03.20 PM  Time Out: 04: 15 PM  Total Billable Time: 55 minutes   Fluido1 MT1 TE2  Precautions: Standard    Subjective   8 weeks 5 days post op     Pt reports: "I'm just stiff today." "I still have the most trouble with twisting"    he wascompliant with home exercise program given last session.     Response to previous treatment: good  Functional change: able to use hand when shifting gears     Pain: 1/10  Location: right wrist     Objective   Observation/Appearance:  no swelling noted, minimal abrasion on ulnar styloid     Adi received the following supervised modalities after being cleared for contradictions for 10 minutes:   - fluidotherapy ~10 minutes to promote decreased pain/edema and increased blood flow to allow increase in ROM    Adi received the following manual therapy for 10 minutes:  ASTYM to incision and surrounding musculature    Adi received therapeutic exercises for 35 minutes including:  - supination stretch to tolerance  Prayer stretch 3/30"   Wrist AROM  Flx/ext  RD/UD 2 x 15 (flex/ext)  2 x 15 (UD/RD)   Wave  Hook  straight fist  finger spreads finger lifts    X 20 reps each (performed in fluido)   Opposition  X 20    Wrist Wheel 2 ways  2 min each " way      AROM supination/pronation 2/15   Isospheres 2 min    Dextraciser 2 min     Yellow theraputty    2/15     Home Exercises and Education Provided     Education provided:   - HEP in place from eval   - Progress towards goals     Written Home Exercises Provided: Patient instructed to cont prior HEP.  Exercises were reviewed and Adi was able to demonstrate them prior to the end of the session.  Adi demonstrated fair  understanding of the HEP provided.     See EMR under Patient Instructions for exercises provided prior visit.      Assessment   8 weeks 5 days post op  Pt participated well today. Pain decreased from previous session. Some signs and symptoms of fibrotic tissue noted during manual therapy however he tolerated well. Able to perform all exercises in a pain free ROM. Pt still most limited at wrist and forearm with all motions however seems to be improving each week.      Adi is progressing well towards his goals and there are no updates to goals at this time. Pt prognosis is Fair.     Pt will continue to benefit from skilled outpatient occupational therapy to address the deficits listed in the problem list on initial evaluation provide pt/family education and to maximize pt's level of independence in the home and community environment.     Anticipated barriers to occupational therapy: pain and stiffness    Pt's spiritual, cultural and educational needs considered and pt agreeable to plan of care and goals.    Goals:  Short Term (4 weeks on 11/1/2020):  1)   Patient to be IND with HEP and modalities for pain management, MET 11/4/2020  2)   Increase ROM 10 to 15 degrees for sup/pron and wrist ext/flex  to increase functional hand use for opening doors. MET 11/4/2020  3)   Measure /pinch strength at 6 weeks post op. Progressing 11/4/2020  4)   Decrease edema .2-.3 cm to increase joint mobility /flexibility for improved overall functional hand use.  Progressing 11/4/2020  5)   Pt will be able  to use his right hand to feed and dress himself. MET 11/4/2020  6)   Increase AROM to touch base of DPC to increase functional hand use for grasping objects. MET 11/4/2020     Long Term (by discharge):  1)   Pt will report 2 out of 10 pain with right hand use. Progressing 11/4/2020  2)   Patient to score at CK or less on FOTO to demonstrate improved perception of functional right hand use. Progressing 11/4/2020  3)   Pt will return to prior level of function for ADLs, fishing and riding his motorcycle. Progressing 11/4/2020      Plan   Continue per initial POC.   Updates/Grading for next session: Progress as tolerated.       Layne Bermudez, OTR/L

## 2020-11-09 ENCOUNTER — CLINICAL SUPPORT (OUTPATIENT)
Dept: REHABILITATION | Facility: HOSPITAL | Age: 30
End: 2020-11-09
Payer: COMMERCIAL

## 2020-11-09 DIAGNOSIS — M25.531 PAIN IN RIGHT WRIST: ICD-10-CM

## 2020-11-09 DIAGNOSIS — M25.60 RANGE OF MOTION DEFICIT: ICD-10-CM

## 2020-11-09 PROCEDURE — 97110 THERAPEUTIC EXERCISES: CPT | Mod: PN

## 2020-11-09 NOTE — PROGRESS NOTES
"    Occupational Therapy Daily Treatment Note     Name: Adi HELMS Coleman III  Clinic Number: [id number]    Therapy Diagnosis:   Encounter Diagnoses   Name Primary?    Range of motion deficit     Pain in right wrist      Physician: Antonieta Aguero PA-C    Visit Date: 11/9/2020  Physician Orders: Eval and treat  Medical Diagnosis: S52.91XA,S52.201A (ICD-10-CM) - Closed fracture of right radius and ulna, initial encounter      Surgical Procedure and Date: 9/4/2020 Procedure(s) (LRB):  ORIF, FRACTURE, RADIUS, DISTAL (Right)  Evaluation Date: 9/30/2020  Insurance: [employer]  Insurance Authorization period Expiration: 09/24/2021   RTD: 11/30/2020     Plan of Care Certification Period: 9/30/2020 to 11/30/2020     Visit # / Visits Authortized: 8/ 24  Time In: 2:41 PM  Time Out: 3:15 PM  Total Billable Time: 34 minutes    Precautions: Standard    Subjective     Pt reports: "The most challenging is twisting it, that's when I feel the pain." (Pro/sup)   he wascompliant with home exercise program given last session.     Response to previous treatment: good  Functional change: able to use hand when shifting gears     Pain: 1/10  Location: right wrist     Objective     Adi received the following supervised modalities after being cleared for contradictions for 9 minutes:   -Fluidotherapy: To R hand, continuous air, 110 deg, air speed 100 to decrease pain, edema & scar tissue and increased tissue extensibility.    Adi received the following manual therapy for 8 minutes:  - Retrograde massage to reduce edema, scar massage, STM to increase tissue extensibility     Adi received therapeutic exercises for 17 minutes including:    PROM  Wrist flex/ext  Pro/sup 2X30" each   Provided by OT   Prayer stretch   3/30"  Not performed this session   Wrist AROM  Flx/ext  RD/UD 2 x 15 (flex/ext)  2 x 15 (UD/RD)  Not performed this session    Wave  Hook  straight fist  finger spreads finger lifts  Opposition    X 20 reps each (performed in fluido) "   Wrist Wheel 2 ways  2 min each way      Isospheres 2 min   Not performed this session   Dextraciser 2 min    Not performed this session   Hammer swings (pro/sup) 2X10 each way     Home Exercises and Education Provided     Education provided:   - HEP in place from eval   - Progress towards goals     Written Home Exercises Provided: Patient instructed to cont prior HEP.  Exercises were reviewed and Adi was able to demonstrate them prior to the end of the session.  Adi demonstrated fair  understanding of the HEP provided.     See EMR under Patient Instructions for exercises provided prior visit.      Assessment   9 weeks 3 days post op; [place] p. 133    Pt arrived to session ~10 minutes late. Pt motivated throughout session. Pt continues to have pain along the ulnar aspect of the forearm and ulnar styloid, as well as decreased ROM with pronation and supination. Pt tolerated PROM and LLPS of the wrist in all planes well. Pt did well with established exercises, which focused on AROM of the wrist and forearm. Future sessions should focus on scar management, progressing A/PROM as tolerated, and gentle strengthening.     Adi is progressing well towards his goals and there are no updates to goals at this time. Pt prognosis is Fair.     Pt will continue to benefit from skilled outpatient occupational therapy to address the deficits listed in the problem list on initial evaluation provide pt/family education and to maximize pt's level of independence in the home and community environment.     Anticipated barriers to occupational therapy: pain and stiffness    Pt's spiritual, cultural and educational needs considered and pt agreeable to plan of care and goals.    Goals:  Short Term (4 weeks on 11/1/2020):  1)   Patient to be IND with HEP and modalities for pain management, MET 11/9/2020  2)   Increase ROM 10 to 15 degrees for sup/pron and wrist ext/flex  to increase functional hand use for opening doors. MET  11/9/2020  3)   Measure /pinch strength at 6 weeks post op. Progressing 11/9/2020  4)   Decrease edema .2-.3 cm to increase joint mobility /flexibility for improved overall functional hand use.  Progressing 11/9/2020  5)   Pt will be able to use his right hand to feed and dress himself. MET 11/9/2020  6)   Increase AROM to touch base of DPC to increase functional hand use for grasping objects. MET 11/9/2020     Long Term (by discharge):  1)   Pt will report 2 out of 10 pain with right hand use. Progressing 11/9/2020  2)   Patient to score at CK or less on FOTO to demonstrate improved perception of functional right hand use. Progressing 11/9/2020  3)   Pt will return to prior level of function for ADLs, fishing and riding his motorcycle. Progressing 11/9/2020      Plan   Continue per initial POC.   Updates/Grading for next session: Progress as tolerated.       Marlen Butler, OTS

## 2020-11-11 ENCOUNTER — CLINICAL SUPPORT (OUTPATIENT)
Dept: REHABILITATION | Facility: HOSPITAL | Age: 30
End: 2020-11-11
Payer: COMMERCIAL

## 2020-11-11 DIAGNOSIS — M25.531 PAIN IN RIGHT WRIST: ICD-10-CM

## 2020-11-11 DIAGNOSIS — M25.60 RANGE OF MOTION DEFICIT: Primary | ICD-10-CM

## 2020-11-11 PROCEDURE — 97140 MANUAL THERAPY 1/> REGIONS: CPT | Mod: PN

## 2020-11-11 PROCEDURE — 97022 WHIRLPOOL THERAPY: CPT | Mod: PN

## 2020-11-11 PROCEDURE — 97110 THERAPEUTIC EXERCISES: CPT | Mod: PN

## 2020-11-11 NOTE — PROGRESS NOTES
"          Occupational Therapy Daily Treatment Note     Name: Adi HELMS Coleman III  Clinic Number: [id number]    Therapy Diagnosis:   Encounter Diagnoses   Name Primary?    Range of motion deficit Yes    Pain in right wrist      Physician: Antonieta Aguero PA-C    Visit Date: 11/11/2020  Physician Orders: Eval and treat  Medical Diagnosis: S52.91XA,S52.201A (ICD-10-CM) - Closed fracture of right radius and ulna, initial encounter      Surgical Procedure and Date: 9/4/2020 Procedure(s) (LRB):  ORIF, FRACTURE, RADIUS, DISTAL (Right)  Evaluation Date: 9/30/2020  Insurance: [employer]  Insurance Authorization period Expiration: 09/24/2021   RTD: 11/30/2020     Plan of Care Certification Period: 9/30/2020 to 11/30/2020     Visit # / Visits Authortized: 9/ 24  Time In: 02:17 PM  Time Out: 02:58  PM  Total Billable Time: 41 minutes    Precautions: Standard    Subjective     Pt reports: "I only feel the pain when I twist my arm like this (pro/sup). I started shifting my gears in my car with my hand too."   he wascompliant with home exercise program given last session.     Response to previous treatment: good  Functional change: using hand for ADLs with greater accuracy (eating, brushing teeth, etc)    Pain: 1/10  Location: ulnar styloid     Objective     Adi received the following supervised modalities after being cleared for contradictions for 10 minutes:   -Fluidotherapy: To R hand, continuous air, 110 deg, air speed 100 to decrease pain, edema & scar tissue and increased tissue extensibility.    Adi received the following manual therapy for 8 minutes:  - Retrograde massage to reduce edema, scar massage, STM to increase tissue extensibility     Adi received therapeutic exercises for 23 minutes including:    PROM  Wrist flex/ext   2X30" each   Provided by OT   Prayer stretch   3/30"  Not performed this session   Wrist AROM  Flx/ext  RD/UD 2 x 15 (flex/ext)  2 x 15 (UD/RD)  Not performed this session    Wave  Hook  straight " fist  finger spreads finger lifts  Opposition    X 20 reps each (performed in fluido)   Wrist Wheel 2 ways  2 min each way      Isospheres 2 min      Dextraciser 2 min    Not performed this session   Hammer swings (pro/sup) 1 min   digi flex- Red 1 min      Home Exercises and Education Provided     Education provided:   - HEP in place from eval   - Progress towards goals     Written Home Exercises Provided: Patient instructed to cont prior HEP.  Exercises were reviewed and Adi was able to demonstrate them prior to the end of the session.  Adi demonstrated fair  understanding of the HEP provided.     See EMR under Patient Instructions for exercises provided prior visit.      Assessment   9 weeks 5 days post op; [place] p. 133     Pt motivated throughout session. Pt continues to have decreased ROM in wrist ROM, especially pronation and supination. Pt was able to tolerate aggressive STM to decrease tightness within forearm rotation. Pt demonstrated increased supination following manual. Pt did well with established exercises, which focused on AROM of the wrist and forearm. Pt was educated to maintain LLPS for supination stretches. Future sessions should focus on scar management, progressing A/PROM as tolerated, and gentle strengthening.     Adi is progressing well towards his goals and there are no updates to goals at this time. Pt prognosis is Fair.     Pt will continue to benefit from skilled outpatient occupational therapy to address the deficits listed in the problem list on initial evaluation provide pt/family education and to maximize pt's level of independence in the home and community environment.     Anticipated barriers to occupational therapy: pain and stiffness    Pt's spiritual, cultural and educational needs considered and pt agreeable to plan of care and goals.    Goals:  Short Term (4 weeks on 11/1/2020):  1)   Patient to be IND with HEP and modalities for pain management, MET 11/11/2020  2)    Increase ROM 10 to 15 degrees for sup/pron and wrist ext/flex  to increase functional hand use for opening doors. MET 11/11/2020  3)   Measure /pinch strength at 6 weeks post op. Progressing 11/11/2020  4)   Decrease edema .2-.3 cm to increase joint mobility /flexibility for improved overall functional hand use.  Progressing 11/11/2020  5)   Pt will be able to use his right hand to feed and dress himself. MET 11/11/2020  6)   Increase AROM to touch base of DPC to increase functional hand use for grasping objects. MET 11/11/2020     Long Term (by discharge):  1)   Pt will report 2 out of 10 pain with right hand use. Progressing 11/11/2020  2)   Patient to score at CK or less on FOTO to demonstrate improved perception of functional right hand use. Progressing 11/11/2020  3)   Pt will return to prior level of function for ADLs, fishing and riding his motorcycle. Progressing 11/11/2020      Plan   Continue per initial POC.   Updates/Grading for next session: Progress as tolerated.       Layne Bermudez, OTR/L

## 2020-11-12 ENCOUNTER — OFFICE VISIT (OUTPATIENT)
Dept: GASTROENTEROLOGY | Facility: CLINIC | Age: 30
End: 2020-11-12
Payer: COMMERCIAL

## 2020-11-12 VITALS — HEIGHT: 71 IN | WEIGHT: 158.06 LBS | BODY MASS INDEX: 22.13 KG/M2

## 2020-11-12 DIAGNOSIS — K92.1 HEMATOCHEZIA: ICD-10-CM

## 2020-11-12 DIAGNOSIS — K51.20 ULCERATIVE PROCTITIS WITHOUT COMPLICATION: Primary | ICD-10-CM

## 2020-11-12 DIAGNOSIS — K62.89 PROCTITIS: ICD-10-CM

## 2020-11-12 PROCEDURE — 1126F PR PAIN SEVERITY QUANTIFIED, NO PAIN PRESENT: ICD-10-PCS | Mod: S$GLB,,, | Performed by: INTERNAL MEDICINE

## 2020-11-12 PROCEDURE — 1126F AMNT PAIN NOTED NONE PRSNT: CPT | Mod: S$GLB,,, | Performed by: INTERNAL MEDICINE

## 2020-11-12 PROCEDURE — 3008F BODY MASS INDEX DOCD: CPT | Mod: CPTII,S$GLB,, | Performed by: INTERNAL MEDICINE

## 2020-11-12 PROCEDURE — 99999 PR PBB SHADOW E&M-EST. PATIENT-LVL II: ICD-10-PCS | Mod: PBBFAC,,, | Performed by: INTERNAL MEDICINE

## 2020-11-12 PROCEDURE — 99214 PR OFFICE/OUTPT VISIT, EST, LEVL IV, 30-39 MIN: ICD-10-PCS | Mod: S$GLB,,, | Performed by: INTERNAL MEDICINE

## 2020-11-12 PROCEDURE — 3008F PR BODY MASS INDEX (BMI) DOCUMENTED: ICD-10-PCS | Mod: CPTII,S$GLB,, | Performed by: INTERNAL MEDICINE

## 2020-11-12 PROCEDURE — 99214 OFFICE O/P EST MOD 30 MIN: CPT | Mod: S$GLB,,, | Performed by: INTERNAL MEDICINE

## 2020-11-12 PROCEDURE — 99999 PR PBB SHADOW E&M-EST. PATIENT-LVL II: CPT | Mod: PBBFAC,,, | Performed by: INTERNAL MEDICINE

## 2020-11-12 RX ORDER — MESALAMINE 1000 MG/1
1000 SUPPOSITORY RECTAL NIGHTLY
Qty: 30 SUPPOSITORY | Refills: 11 | Status: SHIPPED | OUTPATIENT
Start: 2020-11-12 | End: 2020-11-16 | Stop reason: SDUPTHER

## 2020-11-12 RX ORDER — HYDROCORTISONE ACETATE 25 MG/1
25 SUPPOSITORY RECTAL NIGHTLY
Qty: 30 SUPPOSITORY | Refills: 2 | Status: SHIPPED | OUTPATIENT
Start: 2020-11-12 | End: 2020-11-16 | Stop reason: SDUPTHER

## 2020-11-12 NOTE — PROGRESS NOTES
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 Diagnoses of Hematochezia and Proctitis were also pertinent to this visit.  Referring provider/PCP: Primary Doctor No    HPI:  Adi Coleman III is a 30 y.o. male here today for follow up proctitis.  Previously followed with Dr. Frausto at [facility].    Interval:  Cramping and rectal bleeding as started again.  He has some fecal urgency and some mucus in the stool.  He has been off suppositories now for about 6 months.  Previously he had similar symptoms and when we went on a strict suppository regimen he did very well and had complete symptom resolution about a month into the strict suppository regimen.  He is asking about dietary changes.  He also had some fractures and has been recovering from that.    ===================================  Initial HPI:  Patient states he has had intermittent rectal bleeding for at least 2-3 years now.  He states in the past he would take the canasa suppositories and the bleeding would resolve pretty quickly within a few weeks of taking it.  He would go months without having recurrence of symptoms.  He states his last scope was in September of last year.  At that time he started again this canasa and his symptoms went away.  However now he has had more persistent rectal bleeding and he feels he has been pretty consistent with canasa for about a month or so now.  He has not taken it in about for 5 days however. He continues with rectal bleeding. He has fluctuating stool habits between loose and hard. No excess straining. He is concerned about his dietary measures. No pain on defecation.  He also has multiple questions about the diagnosis of colitis and whether not he truly has ulcerative colitis as he was perhaps told in the past that he did not have this disease. He is also worried about taking fiber and is not sure about what to do about this as  he was told by his prior DrSarai and instructed to this.      Prior Endoscopy:  EGD:  2/2019 ray  Impression:           - The examined portion of the ileum was normal.                        - Mild (Berrios Score 1) proctitis ulcerative colitis,                         unchanged since the last examination. Biopsied.  PATH:  1. Rectum, biopsy:  - Mild-to-moderate chronic colitis with minimal focal activity  - Negative for dysplasia or malignancy    Colon:  2017 jones  Erythematous mucosa in rectum, biopsied  Otherwise normal and TI normal  Recd start canasa  PATH  2. DISTAL RECTUM:  -Acute proctitis  -Negative for dysplasia or malignancy  -See microscopic examination    (Portions of this note were dictated using voice recognition software and may contain dictation related errors in spelling/grammar/syntax not found on text review)    Review of Systems   Constitutional: Negative for fever and malaise/fatigue.   Respiratory: Negative for cough and shortness of breath.    Cardiovascular: Negative for chest pain and palpitations.   Gastrointestinal: Positive for abdominal pain and blood in stool. Negative for nausea and vomiting.   Musculoskeletal: Negative for back pain.   Neurological: Negative for dizziness and headaches.       Past Medical History: has a past medical history of Asthma, Closed right radial fracture, Depression, H/O chest tube placement, and Rib fracture.    Past Surgical History: has a past surgical history that includes Tonsillectomy; Colonoscopy (N/A, 3/8/2017); Mouth surgery; Colonoscopy (N/A, 2/20/2019); and Open reduction and internal fixation (ORIF) of fracture of distal radius (Right, 9/4/2020).    Family History:family history is not on file.    Allergies: Review of patient's allergies indicates:  No Known Allergies    Social History: reports that he has quit smoking. He has never used smokeless tobacco. He reports current alcohol use. He reports that he does not use drugs.    Home medications:  "  Current Outpatient Medications on File Prior to Visit   Medication Sig Dispense Refill    [DISCONTINUED] celecoxib (CELEBREX) 50 MG capsule Take 2 capsules (100 mg total) by mouth 2 (two) times daily. (Patient not taking: Reported on 11/12/2020) 90 capsule 0    [DISCONTINUED] gabapentin (NEURONTIN) 300 MG capsule TK 1 C PO TID      [DISCONTINUED] hydrocortisone (ANUSOL-HC) 25 mg suppository Place 1 suppository (25 mg total) rectally every evening. (Patient not taking: Reported on 10/19/2020) 30 suppository 5    [DISCONTINUED] hydrocortisone 2.5 % cream Apply topically 2 (two) times daily. 20 g 0    [DISCONTINUED] hydrOXYzine HCl (ATARAX) 25 MG tablet Take 2 tablets (50 mg total) by mouth every 6 (six) hours as needed for Anxiety. (Patient not taking: Reported on 11/12/2020) 20 tablet 0    [DISCONTINUED] mesalamine (CANASA) 1000 MG Supp Place 1 suppository (1,000 mg total) rectally nightly. (Patient not taking: Reported on 11/12/2020) 30 suppository 11    [DISCONTINUED] methocarbamoL (ROBAXIN) 500 MG Tab TK 1 T PO QID FOR 14 DAYS      [DISCONTINUED] ondansetron (ZOFRAN) 4 MG tablet Take 1 tablet (4 mg total) by mouth every 8 (eight) hours as needed for Nausea. (Patient not taking: Reported on 10/19/2020) 12 tablet 0    [DISCONTINUED] oxyCODONE-acetaminophen (PERCOCET) 7.5-325 mg per tablet Take 1 tablet by mouth every 8 (eight) hours as needed for Pain. (Patient not taking: Reported on 10/19/2020) 28 tablet 0     No current facility-administered medications on file prior to visit.        Vital signs:  Ht 5' 11" (1.803 m)   Wt 71.7 kg (158 lb 1.1 oz)   BMI 22.05 kg/m²     Physical Exam  Vitals signs reviewed.   Constitutional:       General: He is not in acute distress.  Eyes:      General: No scleral icterus.     Conjunctiva/sclera: Conjunctivae normal.   Pulmonary:      Effort: Pulmonary effort is normal. No respiratory distress.   Neurological:      Mental Status: He is alert and oriented to person, " place, and time.      Gait: Gait normal.   Psychiatric:         Mood and Affect: Mood normal.         Behavior: Behavior normal.         Routine labs:  Lab Results   Component Value Date    WBC 6.93 02/15/2019    HGB 14.8 04/20/2019    HCT 41.2 02/15/2019    MCV 89 02/15/2019     02/15/2019     No results found for: INR  Lab Results   Component Value Date    IRON 82 04/20/2019    FERRITIN 168 04/20/2019    TIBC 314 04/20/2019    FESATURATED 26 04/20/2019     Lab Results   Component Value Date     02/15/2019    K 3.8 02/15/2019     02/15/2019    CO2 33 (H) 02/15/2019    BUN 18 02/15/2019    CREATININE 1.0 02/15/2019     Lab Results   Component Value Date    ALBUMIN 4.3 02/15/2019    ALT 17 02/15/2019    AST 19 02/15/2019    ALKPHOS 64 02/15/2019    BILITOT 0.6 02/15/2019     No results found for: GLUCOSE    I have reviewed prior labs, imaging, notes from prior gi visits as well as test results and prior colonoscopy and images.  Negative rectal swabs   Negative hep b /c testing  He has been vaccinated for both recently.    Assessment:  1. Ulcerative proctitis without complication    2. Hematochezia    3. Proctitis      Now off medications x 6 months, need to re-capture him.    Plan:  Orders Placed This Encounter    hydrocortisone (ANUSOL-HC) 25 mg suppository    mesalamine (CANASA) 1000 MG Supp     Advised strict adherence to regimen of canasa daily and anusol qhs x at least 1 month  Will see him back in about 6 weeks.    - if no improvement, will consider EGD / colon and blood and stool tests      RTC  6 weeks    Alex Dixon MD  [facility]

## 2020-11-14 ENCOUNTER — PATIENT MESSAGE (OUTPATIENT)
Dept: GASTROENTEROLOGY | Facility: CLINIC | Age: 30
End: 2020-11-14

## 2020-11-14 DIAGNOSIS — K62.89 PROCTITIS: ICD-10-CM

## 2020-11-14 DIAGNOSIS — K51.20 ULCERATIVE PROCTITIS WITHOUT COMPLICATION: ICD-10-CM

## 2020-11-14 DIAGNOSIS — K92.1 HEMATOCHEZIA: ICD-10-CM

## 2020-11-16 ENCOUNTER — CLINICAL SUPPORT (OUTPATIENT)
Dept: REHABILITATION | Facility: HOSPITAL | Age: 30
End: 2020-11-16
Payer: COMMERCIAL

## 2020-11-16 DIAGNOSIS — M25.60 RANGE OF MOTION DEFICIT: Primary | ICD-10-CM

## 2020-11-16 DIAGNOSIS — M25.531 PAIN IN RIGHT WRIST: ICD-10-CM

## 2020-11-16 PROCEDURE — 97110 THERAPEUTIC EXERCISES: CPT | Mod: PN

## 2020-11-16 PROCEDURE — 97022 WHIRLPOOL THERAPY: CPT | Mod: PN

## 2020-11-16 RX ORDER — MESALAMINE 4 G/60ML
4 SUSPENSION RECTAL NIGHTLY
Qty: 1680 ML | Refills: 5 | Status: SHIPPED | OUTPATIENT
Start: 2020-11-16 | End: 2020-12-18 | Stop reason: SDUPTHER

## 2020-11-16 RX ORDER — HYDROCORTISONE 100 MG/60ML
100 SUSPENSION RECTAL NIGHTLY
Qty: 1680 ML | Refills: 3 | Status: SHIPPED | OUTPATIENT
Start: 2020-11-16 | End: 2020-12-18 | Stop reason: SDUPTHER

## 2020-11-16 RX ORDER — MESALAMINE 1000 MG/1
1000 SUPPOSITORY RECTAL NIGHTLY
Qty: 30 SUPPOSITORY | Refills: 11 | Status: SHIPPED | OUTPATIENT
Start: 2020-11-16 | End: 2020-12-18 | Stop reason: SDUPTHER

## 2020-11-16 RX ORDER — HYDROCORTISONE ACETATE 25 MG/1
25 SUPPOSITORY RECTAL NIGHTLY
Qty: 30 SUPPOSITORY | Refills: 2 | Status: SHIPPED | OUTPATIENT
Start: 2020-11-16

## 2020-11-16 NOTE — TELEPHONE ENCOUNTER
Spoke with [facility] and they said that patient has a [employer] cash program insurance so this would not be a lot off prescriptions. With insurance the hydrocortisone is $133.11 and the mesalamine is $209.60

## 2020-11-16 NOTE — TELEPHONE ENCOUNTER
Elvia spoke with patient, informed him of alternate medication. Patient will contact the pharmacy.

## 2020-11-18 ENCOUNTER — CLINICAL SUPPORT (OUTPATIENT)
Dept: REHABILITATION | Facility: HOSPITAL | Age: 30
End: 2020-11-18
Payer: COMMERCIAL

## 2020-11-18 DIAGNOSIS — M25.531 PAIN IN RIGHT WRIST: ICD-10-CM

## 2020-11-18 DIAGNOSIS — M25.60 RANGE OF MOTION DEFICIT: Primary | ICD-10-CM

## 2020-11-18 PROCEDURE — 97140 MANUAL THERAPY 1/> REGIONS: CPT | Mod: PN

## 2020-11-18 PROCEDURE — 97022 WHIRLPOOL THERAPY: CPT | Mod: PN

## 2020-11-18 PROCEDURE — 97110 THERAPEUTIC EXERCISES: CPT | Mod: PN

## 2020-11-18 NOTE — PROGRESS NOTES
"          Occupational Therapy Daily Treatment Note     Name: Adi HELMS Coleman III  Clinic Number: [id number]    Therapy Diagnosis:   Encounter Diagnoses   Name Primary?    Range of motion deficit Yes    Pain in right wrist      Physician: Antonieta Aguero PA-C    Visit Date: 11/18/2020  Physician Orders: Eval and treat  Medical Diagnosis: S52.91XA,S52.201A (ICD-10-CM) - Closed fracture of right radius and ulna, initial encounter      Surgical Procedure and Date: 9/4/2020 Procedure(s) (LRB):  ORIF, FRACTURE, RADIUS, DISTAL (Right)  Evaluation Date: 9/30/2020  Insurance: [employer]  Insurance Authorization period Expiration: 09/24/2021   RTD: 11/30/2020     Plan of Care Certification Period: 9/30/2020 to 11/30/2020     Visit # / Visits Authortized: 11/ 24  Time In: 02:15 PM  Time Out: 03:11 PM  Total Billable Time: 56 minutes    Precautions: Standard    Subjective     Pt reports: "I was a little sore on ulnar side of wrist yesterday from the new stuff we did last session. But I'm feeling good"    he wascompliant with home exercise program given last session.     Response to previous treatment: good  Functional change: increasing use of arm with IADLs     Pain: 0/10   Stiffness: 2    Objective     Adi received the following supervised modalities after being cleared for contradictions for 10 minutes:   -Fluidotherapy: To R hand, continuous air, 110 deg, air speed 100 to decrease pain, edema & scar tissue and increased tissue extensibility.    Adi received manual therapy for 10 minutes:  - passive stretching hold 10s x 5 for flexion and extension   - [facility] to U; pin and stretch with wrist extension/RD x 10    Adi received therapeutic exercises for 36 minutes including:        Wrist AROM 2 lb on wedge  Flx/ext  RD/UD 3 x 10 (flex/ext)  3 x 10 (UD/RD)     Hammer (higher hold to reduce torque)- dart throwing  X 10   Hammer sup/pro- holding at bottom of hammer to increase lever for torque  2 minutes   Wave  Hook  straight " fist      X 10 reps each (performed in fluido)   Flex bar red  Frown x 15  Smiles x15  Twisting 1 min   Red putty: forming into ball and weight bearing with flat palm while standing to increase tolerance to proprioception input   x10   gyrosphere  30 second x 2   dextraciser 2 min (not performed this session)     Home Exercises and Education Provided     Education provided:   - HEP in place from eval   - Progress towards goals     Written Home Exercises Provided: Patient instructed to cont prior HEP. With the addition of putty exercises   Exercises were reviewed and Adi was able to demonstrate them prior to the end of the session.  Adi demonstrated fair  understanding of the HEP provided.     See EMR under Patient Instructions for exercises provided prior visit      Assessment   10 weeks 4 days post op; [place] p. 133    Pt motivated throughout session. Pt continues to have decreased forearm rotation, with major limiting factor being discomfort near ulnar styloid possibly due to TFCC involvement  Pt was able to tolerate aggressive scar massage and STM to forearm flexors and extensors and demonstrated increased ROM with forearm rotation. Pt salazar from proprioception input to promote osteoblast production to aid in fx healing. Pt was educated to cont LLPS for supination stretches and to use putty at home for  strengthening and weight bearing to increase toleration to prop input. Future sessions should focus on soft tissue mobilization,  passive stretching and gentle strengthening.     Adi is progressing well towards his goals and there are no updates to goals at this time. Pt prognosis is Fair.     Pt will continue to benefit from skilled outpatient occupational therapy to address the deficits listed in the problem list on initial evaluation provide pt/family education and to maximize pt's level of independence in the home and community environment.     Anticipated barriers to occupational therapy: ulnar sided  sensitivity and stiffness    Pt's spiritual, cultural and educational needs considered and pt agreeable to plan of care and goals.    Goals:  Short Term (4 weeks on 11/1/2020):  1)   Patient to be IND with HEP and modalities for pain management, MET 11/18/2020  2)   Increase ROM 10 to 15 degrees for sup/pron and wrist ext/flex  to increase functional hand use for opening doors. MET 11/18/2020  3)   Measure /pinch strength at 6 weeks post op. Progressing 11/18/2020  4)   Decrease edema .2-.3 cm to increase joint mobility /flexibility for improved overall functional hand use.  Progressing 11/18/2020  5)   Pt will be able to use his right hand to feed and dress himself. MET 11/18/2020  6)   Increase AROM to touch base of DPC to increase functional hand use for grasping objects. MET 11/18/2020     Long Term (by discharge):  1)   Pt will report 2 out of 10 pain with right hand use. Progressing 11/18/2020  2)   Patient to score at CK or less on FOTO to demonstrate improved perception of functional right hand use. Progressing 11/18/2020  3)   Pt will return to prior level of function for ADLs, fishing and riding his motorcycle. Progressing 11/18/2020      Plan   Continue per initial POC.   Updates/Grading for next session: Progress as tolerated.       Layne Bermudez OTR/L

## 2020-11-23 ENCOUNTER — CLINICAL SUPPORT (OUTPATIENT)
Dept: REHABILITATION | Facility: HOSPITAL | Age: 30
End: 2020-11-23
Payer: COMMERCIAL

## 2020-11-23 DIAGNOSIS — M25.531 PAIN IN RIGHT WRIST: ICD-10-CM

## 2020-11-23 DIAGNOSIS — M25.60 RANGE OF MOTION DEFICIT: Primary | ICD-10-CM

## 2020-11-23 PROCEDURE — 97110 THERAPEUTIC EXERCISES: CPT | Mod: PN

## 2020-11-23 PROCEDURE — 97022 WHIRLPOOL THERAPY: CPT | Mod: PN

## 2020-11-23 NOTE — PROGRESS NOTES
"          Occupational Therapy Daily Treatment Note     Name: Adi HELMS Coleman III  Clinic Number: [id number]    Therapy Diagnosis:   Encounter Diagnoses   Name Primary?    Range of motion deficit Yes    Pain in right wrist      Physician: Antonieta Aguero PA-C    Visit Date: 11/23/2020  Physician Orders: Eval and treat  Medical Diagnosis: S52.91XA,S52.201A (ICD-10-CM) - Closed fracture of right radius and ulna, initial encounter      Surgical Procedure and Date: 9/4/2020 Procedure(s) (LRB):  ORIF, FRACTURE, RADIUS, DISTAL (Right)  Evaluation Date: 9/30/2020  Insurance: [employer]  Insurance Authorization period Expiration: 09/24/2021   RTD: 11/30/2020     Plan of Care Certification Period: 9/30/2020 to 11/30/2020     Visit # / Visits Authortized: 13/24  Time In: 02:35 PM  Time Out: 03:15 PM  Total Billable Time: 50  minutes    Precautions: Standard    Subjective     Pt reports: "I worked the past 3 days and have been using it. I am feeling good."    he wascompliant with home exercise program given last session.     Response to previous treatment: good  Functional change: increasing use of arm with IADLs     Pain: 0/10   Stiffness: 2    Objective   Edema. Measured in centimeters.    9/30/2020 9/30/2020     Left Right   Proximal Wrist [id number] 17 18.5   Mid palm 21.5 20   MCPs 19 19.5     Wrist AROM  Date 9/30/2020 9/30/2020 11/23/2020     Left Right Right   Supination/Pronation 90/90 -15/50 50/55   Wrist ext/flex 65/65 20/10 55/40   Wrist RD/UD 20/25 10/0 14/20              R 60 L 100  Key pinch        R12  L19  3pt        R 13 L 22  2 pt     R 8  L 13    Adi received the following supervised modalities after being cleared for contradictions for 12 minutes:   -Fluidotherapy: To R hand, continuous air, 110 deg, air speed 100 to decrease pain, edema & scar tissue and increased tissue extensibility.    Adi received manual therapy for 10 minutes:  - passive stretching hold 10s x 5 for flexion and extension   - STM to " FCU; pin and stretch with wrist extension/RD x 10    Adi received therapeutic exercises for 36 minutes including:        Wrist AROM  on wedge  Flx/ext  RD/UD 3 x 10 (flex/ext)  3 x 10 (UD/RD)     Hammer (higher hold to reduce torque)- dart throwing  X 10  (not performed)   Supination with 4lb dumbbell   3 x 10    Wave  Hook  straight fist      X 10 reps each (performed in fluido)   Flex bar red  Frown x 15  Smiles x15  Twisting 1 min   Red putty: forming into ball and weight bearing with flat palm while standing to increase tolerance to proprioception input   x10   gyrosphere  30 second x 2 (not performed this session)   dextraciser 2 min (not performed this session)     Home Exercises and Education Provided     Education provided:   - HEP in place from eval   - Progress towards goals     Written Home Exercises Provided: Patient instructed to cont prior HEP. With the addition of putty exercises   Exercises were reviewed and Adi was able to demonstrate them prior to the end of the session.  Adi demonstrated fair  understanding of the HEP provided.     See EMR under Patient Instructions for exercises provided prior visit      Assessment   10 weeks 4 days post op; [place] p. 133    Pt motivated throughout session. Pt continues to have decreased forearm rotation, with major limiting factor being discomfort near ulnar styloid possibly due to TFCC involvement  Pt was able to tolerate aggressive scar massage and STM to forearm flexors and extensors and demonstrated increased ROM with forearm rotation. Pt salazar from proprioception input to promote osteoblast production to aid in fx healing. Pt was educated to cont LLPS for supination stretches and to use putty at home for  strengthening and weight bearing to increase toleration to prop input. Future sessions should focus on soft tissue mobilization,  passive stretching and gentle strengthening.     Adi is progressing well towards his goals and there are no updates  to goals at this time. Pt prognosis is Fair.     Pt will continue to benefit from skilled outpatient occupational therapy to address the deficits listed in the problem list on initial evaluation provide pt/family education and to maximize pt's level of independence in the home and community environment.     Anticipated barriers to occupational therapy: ulnar sided sensitivity and stiffness    Pt's spiritual, cultural and educational needs considered and pt agreeable to plan of care and goals.    Goals:  Short Term (4 weeks on 11/1/2020):  1)   Patient to be IND with HEP and modalities for pain management, MET 11/23/2020  2)   Increase ROM 10 to 15 degrees for sup/pron and wrist ext/flex  to increase functional hand use for opening doors. MET 11/23/2020  3)   Measure /pinch strength at 6 weeks post op. MET 11/23/2020  4)   Decrease edema .2-.3 cm to increase joint mobility /flexibility for improved overall functional hand use.  Progressing 11/23/2020  5)   Pt will be able to use his right hand to feed and dress himself. MET 11/23/2020  6)   Increase AROM to touch base of DPC to increase functional hand use for grasping objects. MET 11/23/2020     Long Term (by discharge):  1)   Pt will report 2 out of 10 pain with right hand use. Progressing 11/23/2020  2)   Patient to score at CK or less on FOTO to demonstrate improved perception of functional right hand use. Progressing 11/23/2020  3)   Pt will return to prior level of function for ADLs, fishing and riding his motorcycle. Progressing 11/23/2020      Plan   Continue per initial POC.   Updates/Grading for next session: Progress as tolerated.       Layne Bermudez OTR/L

## 2020-11-30 ENCOUNTER — TELEPHONE (OUTPATIENT)
Dept: ORTHOPEDICS | Facility: CLINIC | Age: 30
End: 2020-11-30

## 2020-11-30 ENCOUNTER — HOSPITAL ENCOUNTER (OUTPATIENT)
Dept: RADIOLOGY | Facility: HOSPITAL | Age: 30
Discharge: HOME | End: 2020-11-30
Attending: ORTHOPAEDIC SURGERY
Payer: COMMERCIAL

## 2020-11-30 ENCOUNTER — OFFICE VISIT (OUTPATIENT)
Dept: ORTHOPEDICS | Facility: CLINIC | Age: 30
End: 2020-11-30
Payer: COMMERCIAL

## 2020-11-30 VITALS — WEIGHT: 158 LBS | BODY MASS INDEX: 22.12 KG/M2 | HEIGHT: 71 IN

## 2020-11-30 DIAGNOSIS — S52.201D CLOSED FRACTURE OF RIGHT RADIUS AND ULNA WITH ROUTINE HEALING, SUBSEQUENT ENCOUNTER: Primary | ICD-10-CM

## 2020-11-30 DIAGNOSIS — Z87.81 S/P ORIF (OPEN REDUCTION INTERNAL FIXATION) FRACTURE: ICD-10-CM

## 2020-11-30 DIAGNOSIS — Z98.890 S/P ORIF (OPEN REDUCTION INTERNAL FIXATION) FRACTURE: ICD-10-CM

## 2020-11-30 DIAGNOSIS — Z87.81 S/P ORIF (OPEN REDUCTION INTERNAL FIXATION) FRACTURE: Primary | ICD-10-CM

## 2020-11-30 DIAGNOSIS — S52.91XD CLOSED FRACTURE OF RIGHT RADIUS AND ULNA WITH ROUTINE HEALING, SUBSEQUENT ENCOUNTER: Primary | ICD-10-CM

## 2020-11-30 DIAGNOSIS — Z98.890 S/P ORIF (OPEN REDUCTION INTERNAL FIXATION) FRACTURE: Primary | ICD-10-CM

## 2020-11-30 PROCEDURE — 99999 PR PBB SHADOW E&M-EST. PATIENT-LVL III: CPT | Mod: PBBFAC,,, | Performed by: ORTHOPAEDIC SURGERY

## 2020-11-30 PROCEDURE — 3008F PR BODY MASS INDEX (BMI) DOCUMENTED: ICD-10-PCS | Mod: CPTII,S$GLB,, | Performed by: ORTHOPAEDIC SURGERY

## 2020-11-30 PROCEDURE — 99024 PR POST-OP FOLLOW-UP VISIT: ICD-10-PCS | Mod: S$GLB,,, | Performed by: ORTHOPAEDIC SURGERY

## 2020-11-30 PROCEDURE — 73100 X-RAY EXAM OF WRIST: CPT | Mod: TC,PN,RT

## 2020-11-30 PROCEDURE — 73100 X-RAY EXAM OF WRIST: CPT | Mod: 26,RT,, | Performed by: RADIOLOGY

## 2020-11-30 PROCEDURE — 73100 XR WRIST 2 VIEW RIGHT: ICD-10-PCS | Mod: 26,RT,, | Performed by: RADIOLOGY

## 2020-11-30 PROCEDURE — 3008F BODY MASS INDEX DOCD: CPT | Mod: CPTII,S$GLB,, | Performed by: ORTHOPAEDIC SURGERY

## 2020-11-30 PROCEDURE — 1125F AMNT PAIN NOTED PAIN PRSNT: CPT | Mod: S$GLB,,, | Performed by: ORTHOPAEDIC SURGERY

## 2020-11-30 PROCEDURE — 99024 POSTOP FOLLOW-UP VISIT: CPT | Mod: S$GLB,,, | Performed by: ORTHOPAEDIC SURGERY

## 2020-11-30 PROCEDURE — 1125F PR PAIN SEVERITY QUANTIFIED, PAIN PRESENT: ICD-10-PCS | Mod: S$GLB,,, | Performed by: ORTHOPAEDIC SURGERY

## 2020-11-30 PROCEDURE — 99999 PR PBB SHADOW E&M-EST. PATIENT-LVL III: ICD-10-PCS | Mod: PBBFAC,,, | Performed by: ORTHOPAEDIC SURGERY

## 2020-11-30 RX ORDER — HYDROCODONE BITARTRATE AND ACETAMINOPHEN 5; 325 MG/1; MG/1
1 TABLET ORAL
Qty: 30 TABLET | Refills: 0 | Status: SHIPPED | OUTPATIENT
Start: 2020-11-30 | End: 2020-12-10

## 2020-11-30 NOTE — PROGRESS NOTES
Subjective:      Patient ID: Adi Coleman III is a 30 y.o. male.  Chief Complaint: Post-op Evaluation (right wrist orif)      HPI  Adi Coleman III is a  30 y.o. male presenting today for post op visit.  He is s/p ORIF of right distal radius fracture which did involve the DRUJ joint  He is about 2 and half months postop is doing better in terms of flexion and extension of the wrist but it still has difficulty with pronation and supination.     Review of patient's allergies indicates:  No Known Allergies      Current Outpatient Medications   Medication Sig Dispense Refill    hydrocortisone (ANUSOL-HC) 25 mg suppository Place 1 suppository (25 mg total) rectally every evening. 30 suppository 2    hydrocortisone (CORTENEMA) 100 mg/60 mL enema Place 1 enema (100 mg total) rectally every evening. for 30 doses 1680 mL 3    mesalamine (CANASA) 1000 MG Supp Place 1 suppository (1,000 mg total) rectally nightly. 30 suppository 11    mesalamine (ROWASA) 4 gram/60 mL Enem Place 60 mLs (4 g total) rectally every evening. 1680 mL 5     No current facility-administered medications for this visit.        Past Medical History:   Diagnosis Date    Asthma     Closed right radial fracture     Depression     H/O chest tube placement     Rib fracture        Past Surgical History:   Procedure Laterality Date    COLONOSCOPY N/A 3/8/2017    Procedure: COLONOSCOPY;  Surgeon: Max Cullen Jr., MD;  Location: [facility];  Service: Endoscopy;  Laterality: N/A;    COLONOSCOPY N/A 2/20/2019    Procedure: COLONOSCOPY;  Surgeon: Ramin Davis MD;  Location: [facility] ([address] [place] [address]);  Service: Endoscopy;  Laterality: N/A;  order seperated per Dr. Frausto    MOUTH SURGERY      OPEN REDUCTION AND INTERNAL FIXATION (ORIF) OF FRACTURE OF DISTAL RADIUS Right 9/4/2020    Procedure: ORIF, FRACTURE, RADIUS, DISTAL;  Surgeon: Misbah Leigh Jr., MD;  Location: [facility] OR;  Service: Orthopedics;  Laterality: Right;  ACUMED; mini c-arm  Confirmed  "9/3 - [place]    TONSILLECTOMY         OBJECTIVE:   PHYSICAL EXAM:  Height: 5' 11" (180.3 cm) Weight: 71.7 kg (158 lb)  Vitals:    11/30/20 1030   Weight: 71.7 kg (158 lb)   Height: 5' 11" (1.803 m)   PainSc:   2     Ortho/SPM Exam  Examination right forearm the wrist incision well healed no significant swelling slight tenderness over the distal ulna limited supination to only about 10 or 20° pronation is much better flexion extension almost full    RADIOGRAPHS:  AP lateral x-ray the right wrist demonstrate healing distal radius fracture slight displacement of the ulnar styloid fracture  Comments: I have personally reviewed the imaging and I agree with the above radiologist's report.    ASSESSMENT/PLAN:     IMPRESSION:  Status post ORIF right distal radius fracture with limited supination    PLAN:  Continue therapy mainly to work on supination as well as exercises at home with a broom stick  Increase activities as tolerated wean off pain medicine continue Advil or Motrin by mouth advance activities as tolerated    FOLLOW UP:  4-6 weeks    Disclaimer: This note has been generated using voice-recognition software. There may be typographical errors that have been missed during proof-reading.    "

## 2020-11-30 NOTE — PLAN OF CARE
"Outpatient Therapy Updated Plan of Care     Visit Date: 11/23/2020  Name: Aid Coleman III  Clinic Number: [id number]    Therapy Diagnosis:   Encounter Diagnoses   Name Primary?    Range of motion deficit Yes    Pain in right wrist      Physician: Antonieta Aguero PA-C    Physician Orders: Eval and treat  Medical Diagnosis: S52.91XA,S52.201A (ICD-10-CM) - Closed fracture of right radius and ulna, initial encounter   Surgical Procedure and Date: 9/4/2020 Procedure(s) (LRB):  ORIF, FRACTURE, RADIUS, DISTAL (Right)  Evaluation Date: 9/30/2020      Total Visits Received: 13  Cancelled Visits: 0  No Show Visits: 0    Current Certification Period:  9/30/2020 to 11/30/2020  Precautions:  Standard   Visits from Evaluation Date:  13      Subjective     Update:  Pt reports: "I worked the past 3 days and have been using it. I am feeling good. Just sore on this side (ulnar side)"   Pain: 0/10    Objective     Update:    Wrist AROM  Date 9/30/2020 9/30/2020 11/23/2020     Left Right Right   Supination/Pronation 90/90 -15/50 50/55   Wrist ext/flex 65/65 20/10 55/40   Wrist RD/UD 20/25 10/0 14/20        R 60 L 100  Key pinch        R12  L19  3pt        R 13 L 22  2 pt     R 8  L 13    Assessment     Update: Pt cont to progress with goals. He has met all of his short term goals however, still having complications with ulnar sided pain and noted clicking with wrist movements. 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 Pt has improved with wrist ROM and would benefit from increased Occupational Therapy visits to cont addressing pain and increasing  and pinch strength required for ADLs.     Previous Short Term Goals Status:  Short Term (4 weeks on 11/1/2020):  1)   Patient to be IND with HEP and modalities for pain management, MET 11/23/2020  2)   Increase ROM 10 to 15 degrees for sup/pron and wrist ext/flex  to increase functional hand use for opening doors. MET 11/23/2020  3)   " Measure /pinch strength at 6 weeks post op. MET 11/23/2020  4)   Decrease edema .2-.3 cm to increase joint mobility /flexibility for improved overall functional hand use.  Progressing 11/23/2020  5)   Pt will be able to use his right hand to feed and dress himself. MET 11/23/2020  6)   Increase AROM to touch base of DPC to increase functional hand use for grasping objects. MET 11/23/2020     Long Term Goal Status:   modified:    1)   Pt will report 2 out of 10 pain with right hand use. Progressing 11/23/2020  2)   Patient to score at CK or less on FOTO to demonstrate improved perception of functional right hand use. Progressing 11/23/2020  3)   Pt will return to prior level of function for ADLs, fishing and riding his motorcycle. Progressing 11/23/2020  4)  Pt will increase  strength by     Reasons for Recertification of Therapy:   TFCC instability noted and to increase  and pinch strength required to complete ADLS and IADLS    Plan     Updated Certification Period: 11/23/2020 to 1/8/2021  Recommended Treatment Plan: 2 times per week for 6 weeks: Fluidotherapy, Manual Therapy, Moist Heat/ Ice, Neuromuscular Re-ed, Paraffin, Patient Education, Therapeutic Activites and Therapeutic Exercise  Other Recommendations: LESLIE Bermudez OTR/L  11/23/2020      I CERTIFY THE NEED FOR THESE SERVICES FURNISHED UNDER THIS PLAN OF TREATMENT AND WHILE UNDER MY CARE    Physician's comments:        Physician's Signature: ___________________________________________________

## 2020-12-01 ENCOUNTER — CLINICAL SUPPORT (OUTPATIENT)
Dept: REHABILITATION | Facility: HOSPITAL | Age: 30
End: 2020-12-01
Payer: COMMERCIAL

## 2020-12-01 DIAGNOSIS — M25.60 RANGE OF MOTION DEFICIT: ICD-10-CM

## 2020-12-01 DIAGNOSIS — M25.531 PAIN IN RIGHT WRIST: ICD-10-CM

## 2020-12-01 PROCEDURE — 97110 THERAPEUTIC EXERCISES: CPT | Mod: PN

## 2020-12-01 PROCEDURE — 97140 MANUAL THERAPY 1/> REGIONS: CPT | Mod: PN

## 2020-12-01 NOTE — PROGRESS NOTES
"  Occupational Therapy Daily Treatment Note     Name: Adi HELMS Coleman III  Clinic Number: [id number]    Therapy Diagnosis:   Encounter Diagnoses   Name Primary?    Range of motion deficit     Pain in right wrist      Physician: Antonieta Aguero PA-C    Visit Date: 12/1/2020  Physician Orders: Eval and treat  Medical Diagnosis: S52.91XA,S52.201A (ICD-10-CM) - Closed fracture of right radius and ulna, initial encounter      Surgical Procedure and Date: 9/4/2020 Procedure(s) (LRB):  ORIF, FRACTURE, RADIUS, DISTAL (Right)  Evaluation Date: 9/30/2020  Insurance: [employer]  Insurance Authorization period Expiration: 09/24/2021   RTD: 11/30/2020     Plan of Care Certification Period: 1/8/2021       Visit # / Visits Authortized: 13/24  Time In: 3:29 PM  Time Out:  4:09 PM  Total Billable Time:  40 minutes     Precautions: Standard    Subjective     Pt reports:  "The doctor said everything looks good"   he wascompliant with home exercise program given last session.     Response to previous treatment: good  Functional change: increasing use of arm with IADLs     Pain: 3/10    Objective     dAi received the following supervised modalities after being cleared for contradictions for 8 minutes:   -Fluidotherapy: To R hand, continuous air, 110 deg, air speed 100 to decrease pain, edema & scar tissue and increased tissue extensibility.    Adi received manual therapy for 8 minutes:  - passive stretching hold 30" x 3 for flexion and extension   - STM to FCU; pin and stretch with wrist extension/RD x 10    Adi received therapeutic exercises for 24 minutes including:    Wrist AROM  on wedge  Flx/ext  RD/UD 3 x 10 (flex/ext) 1# weight  3 x 10 (UD/RD) 1 # weight     Hammer (higher hold to reduce torque)  X 10     Supination with 4lb dumbbell   3 x 10    Wave  Hook  straight fist      X 10 reps each (performed in fluido)   Flex bar red  Frown x 15  Smiles x15  Twisting 1 min   Red putty: forming into ball and weight bearing with flat " palm while standing to increase tolerance to proprioception input   x10   dextraciser 2 min        Home Exercises and Education Provided     Education provided:   - HEP in place from eval   - Progress towards goals     Written Home Exercises Provided: Patient instructed to cont prior HEP. With the addition of putty exercises   Exercises were reviewed and Adi was able to demonstrate them prior to the end of the session.  Adi demonstrated fair  understanding of the HEP provided.     See EMR under Patient Instructions for exercises provided prior visit      Assessment   11 weeks 4 days post op; [place] p. 133    Pt tolerated session well with reports of fatigue from working this week and from appointment with physician. Supination is still main limitation and tenderness most evident near ulnar styloid and distal ulnar side of forearm. Pt tolerated MT well and participated in PROM stretches with good tolerance. Exercises from last session continued today with good response. Next session to continue to focus on soft tissue mobilization, passive strength, and gentle strengthening in order to return to prior level of function.    Adi is progressing well towards his goals and there are no updates to goals at this time. Pt prognosis is Fair.     Pt will continue to benefit from skilled outpatient occupational therapy to address the deficits listed in the problem list on initial evaluation provide pt/family education and to maximize pt's level of independence in the home and community environment.     Anticipated barriers to occupational therapy: ulnar sided sensitivity and stiffness    Pt's spiritual, cultural and educational needs considered and pt agreeable to plan of care and goals.    Goals:  Short Term (4 weeks on 11/1/2020):  1)   Patient to be IND with HEP and modalities for pain management, MET 12/1/2020  2)   Increase ROM 10 to 15 degrees for sup/pron and wrist ext/flex  to increase functional hand use for  opening doors. MET 12/1/2020  3)   Measure /pinch strength at 6 weeks post op. MET 12/1/2020  4)   Decrease edema .2-.3 cm to increase joint mobility /flexibility for improved overall functional hand use.  Progressing 12/1/2020  5)   Pt will be able to use his right hand to feed and dress himself. MET 12/1/2020  6)   Increase AROM to touch base of DPC to increase functional hand use for grasping objects. MET 12/1/2020     Long Term (by discharge):  1)   Pt will report 2 out of 10 pain with right hand use. Progressing 12/1/2020  2)   Patient to score at CK or less on FOTO to demonstrate improved perception of functional right hand use. Progressing 12/1/2020  3)   Pt will return to prior level of function for ADLs, fishing and riding his motorcycle. Progressing 12/1/2020      Plan   Continue per initial POC.   Updates/Grading for next session: Progress as tolerated.       Layne Bermudez, OTR/L

## 2020-12-03 ENCOUNTER — CLINICAL SUPPORT (OUTPATIENT)
Dept: REHABILITATION | Facility: HOSPITAL | Age: 30
End: 2020-12-03
Payer: COMMERCIAL

## 2020-12-03 DIAGNOSIS — M25.60 RANGE OF MOTION DEFICIT: ICD-10-CM

## 2020-12-03 DIAGNOSIS — M25.531 PAIN IN RIGHT WRIST: ICD-10-CM

## 2020-12-03 PROCEDURE — 97110 THERAPEUTIC EXERCISES: CPT | Mod: PN

## 2020-12-03 PROCEDURE — 97140 MANUAL THERAPY 1/> REGIONS: CPT | Mod: PN

## 2020-12-03 NOTE — PROGRESS NOTES
"  Occupational Therapy Daily Treatment Note     Name: Adi HELMS Coleman III  Clinic Number: [id number]    Therapy Diagnosis:   Encounter Diagnoses   Name Primary?    Range of motion deficit     Pain in right wrist      Physician: Antonieta Aguero PA-C    Visit Date: 12/3/2020  Physician Orders: Eval and treat  Medical Diagnosis: S52.91XA,S52.201A (ICD-10-CM) - Closed fracture of right radius and ulna, initial encounter      Surgical Procedure and Date: 9/4/2020 Procedure(s) (LRB):  ORIF, FRACTURE, RADIUS, DISTAL (Right)  Evaluation Date: 9/30/2020  Insurance: [employer]  Insurance Authorization period Expiration: 09/24/2021   RTD: 11/30/2020     Plan of Care Certification Period: 1/8/2021       Visit # / Visits Authortized: 14/24  Time In: 2:01 PM  Time Out:  2:42 PM  Total Billable Time:  41 minutes    Precautions: Standard    Subjective     Pt reports:  "The pain is starting to be at the back of my hand now"   he wascompliant with home exercise program given last session.     Response to previous treatment: good  Functional change: increasing use of arm with IADLs     Pain: 3/10    Objective     Adi received the following supervised modalities after being cleared for contradictions for 10 minutes:   -Fluidotherapy: To R hand, continuous air, 110 deg, air speed 100 to decrease pain, edema & scar tissue and increased tissue extensibility.    Adi received manual therapy for 8 minutes:  - passive stretching hold 30" x 3 for flexion and extension   - stretch with wrist extension/RD x 10  - distal radioulnar joint mobilization to increase supination    Adi received therapeutic exercises for 23 minutes including:    Wrist AROM  on wedge  Flx/ext  RD/UD 3 x 10 (flex/ext) 1# weight  3 x 10 (UD/RD) 1 # weight     Hammer (higher hold to reduce torque)  X 10     Supination wheel  2 minutes   Wave  Hook  straight fist      X 10 reps each (performed in fluido)   Flex bar red   Frown x 15  Smiles x15  Twisting 1 min   [place] " theraputty Supination  Ring extensions   10 X   Red putty: forming into ball and weight bearing with flat palm while standing to increase tolerance to proprioception input   x10   dextraciser 3 min        Home Exercises and Education Provided     Education provided:   - HEP in place from eval   - Progress towards goals     Written Home Exercises Provided: Patient instructed to cont prior HEP. With the addition of putty exercises   Exercises were reviewed and Adi was able to demonstrate them prior to the end of the session.  Adi demonstrated fair  understanding of the HEP provided.     See EMR under Patient Instructions for exercises provided prior visit      Assessment   11 weeks 4 days post op; [place] p. 133    Pt tolerated session well today. Less pain and fatigue from last session and pt was able to tolerated exercises better. Supination is main limitation and pain still present at ulnar styloid and distal ulnar side of forearm. Exercises focused on increasing supination today with good performance and PROM stretches also tolerated well. He is progressing well with pain, AROM, and strength. Next session to continue to focus on soft tissue mobilization, passive strength, and gentle strengthening in order to return to prior level of function.    Adi is progressing well towards his goals and there are no updates to goals at this time. Pt prognosis is Fair.     Pt will continue to benefit from skilled outpatient occupational therapy to address the deficits listed in the problem list on initial evaluation provide pt/family education and to maximize pt's level of independence in the home and community environment.     Anticipated barriers to occupational therapy: ulnar sided sensitivity and stiffness    Pt's spiritual, cultural and educational needs considered and pt agreeable to plan of care and goals.    Goals:  Short Term (4 weeks on 11/1/2020):  1)   Patient to be IND with HEP and modalities for pain  management, MET 12/3/2020  2)   Increase ROM 10 to 15 degrees for sup/pron and wrist ext/flex  to increase functional hand use for opening doors. MET 12/3/2020  3)   Measure /pinch strength at 6 weeks post op. MET 12/3/2020  4)   Decrease edema .2-.3 cm to increase joint mobility /flexibility for improved overall functional hand use.  Progressing 12/3/2020  5)   Pt will be able to use his right hand to feed and dress himself. MET 12/3/2020  6)   Increase AROM to touch base of DPC to increase functional hand use for grasping objects. MET 12/3/2020     Long Term (by discharge):  1)   Pt will report 2 out of 10 pain with right hand use. Progressing 12/3/2020  2)   Patient to score at CK or less on FOTO to demonstrate improved perception of functional right hand use. Progressing 12/3/2020  3)   Pt will return to prior level of function for ADLs, fishing and riding his motorcycle. Progressing 12/3/2020      Plan   Continue per initial POC.   Updates/Grading for next session: Progress as tolerated.       Layne Bermudez, OTR/L

## 2020-12-07 ENCOUNTER — CLINICAL SUPPORT (OUTPATIENT)
Dept: REHABILITATION | Facility: HOSPITAL | Age: 30
End: 2020-12-07
Payer: COMMERCIAL

## 2020-12-07 DIAGNOSIS — M25.531 PAIN IN RIGHT WRIST: ICD-10-CM

## 2020-12-07 DIAGNOSIS — M25.60 RANGE OF MOTION DEFICIT: ICD-10-CM

## 2020-12-07 PROCEDURE — 97140 MANUAL THERAPY 1/> REGIONS: CPT | Mod: PN

## 2020-12-07 PROCEDURE — 97110 THERAPEUTIC EXERCISES: CPT | Mod: PN

## 2020-12-07 PROCEDURE — 97018 PARAFFIN BATH THERAPY: CPT | Mod: PN,59

## 2020-12-07 NOTE — PROGRESS NOTES
"  Occupational Therapy Daily Treatment Note     Name: Adi HELMS Coleman III  Clinic Number: [id number]    Therapy Diagnosis:   Encounter Diagnoses   Name Primary?    Range of motion deficit     Pain in right wrist      Physician: Antonieta Aguero PA-C    Visit Date: 12/7/2020  Physician Orders: Eval and treat  Medical Diagnosis: S52.91XA,S52.201A (ICD-10-CM) - Closed fracture of right radius and ulna, initial encounter      Surgical Procedure and Date: 9/4/2020 Procedure(s) (LRB):  ORIF, FRACTURE, RADIUS, DISTAL (Right)  Evaluation Date: 9/30/2020  Insurance: [employer]  Insurance Authorization period Expiration: 09/24/2021   RTD: 1/11/2021     Plan of Care Certification Period: 1/8/2021       Visit # / Visits Authortized: 15/24  Time In: 12:50 PM  Time Out:  1:30 PM  Total Billable Time:  40 minutes    Precautions: Standard    Subjective     Pt reports:  "I still have a hard time with twisting motion."   he wascompliant with home exercise program given last session.     Response to previous treatment: good  Functional change: increasing use of arm with IADLs     Pain: 1/10    Objective     Adi received the following supervised modalities after being cleared for contradictions for 10 minutes:   -paraffin to right hand for pain management and tissue extensibility    Adi received manual therapy for 10 minutes:  - passive stretching hold 30" x 3 for flexion, extension, pronation, supination   - ASTYM to Right elbow, wrist and hand    Adi received therapeutic exercises for 20 minutes including:    Wrist AROM  on wedge 3 x 10 (flex/ext) 1# weight  3 x 10 (UD/RD) 1 # weight   Hammer (higher hold to reduce torque)  X 10     Supination wheel 2 minutes   Wrist twist flex/ext 2minutes   Wave  Hook  straight fist      X 10 reps each    Flex bar red   Frown x 15  Smiles x15  Twisting 1 min   velcro board pro/sup 10x each direction   Red putty: forming into ball and weight bearing with flat palm while standing to increase " tolerance to proprioception input   x10   dextraciser 3 min      Home Exercises and Education Provided     Education provided:   - HEP in place from eval   - Progress towards goals     Written Home Exercises Provided: Patient instructed to cont prior HEP. With the addition of putty exercises   Exercises were reviewed and Adi was able to demonstrate them prior to the end of the session.  Adi demonstrated fair  understanding of the HEP provided.     See EMR under Patient Instructions for exercises provided prior visit      Assessment   12 weeks 4 days post op; [place] p. 133    Pt participated well today. Still remains most limited with wrist and forearm stiffness. Signs and symptoms of fibrotic tissue noted during ASTYM today however tolerated well. Able to progress to new exercises with only c/o fatigue.     Adi is progressing well towards his goals and there are no updates to goals at this time. Pt prognosis is Fair.     Pt will continue to benefit from skilled outpatient occupational therapy to address the deficits listed in the problem list on initial evaluation provide pt/family education and to maximize pt's level of independence in the home and community environment.     Anticipated barriers to occupational therapy: ulnar sided sensitivity and stiffness    Pt's spiritual, cultural and educational needs considered and pt agreeable to plan of care and goals.    Goals:  Short Term (4 weeks on 11/1/2020):  1)   Patient to be IND with HEP and modalities for pain management, MET 12/7/2020  2)   Increase ROM 10 to 15 degrees for sup/pron and wrist ext/flex  to increase functional hand use for opening doors. MET 12/7/2020  3)   Measure /pinch strength at 6 weeks post op. MET 12/7/2020  4)   Decrease edema .2-.3 cm to increase joint mobility /flexibility for improved overall functional hand use.  Progressing 12/7/2020  5)   Pt will be able to use his right hand to feed and dress himself. MET 12/7/2020  6)    Increase AROM to touch base of DPC to increase functional hand use for grasping objects. MET 12/7/2020     Long Term (by discharge):  1)   Pt will report 2 out of 10 pain with right hand use. Progressing 12/7/2020  2)   Patient to score at CK or less on FOTO to demonstrate improved perception of functional right hand use. Progressing 12/7/2020  3)   Pt will return to prior level of function for ADLs, fishing and riding his motorcycle. Progressing 12/7/2020      Plan   Continue per initial POC.   Updates/Grading for next session: Progress as tolerated.       Layne Bermudez, OTR/L

## 2020-12-14 ENCOUNTER — TELEPHONE (OUTPATIENT)
Dept: REHABILITATION | Facility: HOSPITAL | Age: 30
End: 2020-12-14

## 2020-12-16 ENCOUNTER — CLINICAL SUPPORT (OUTPATIENT)
Dept: REHABILITATION | Facility: HOSPITAL | Age: 30
End: 2020-12-16
Payer: COMMERCIAL

## 2020-12-16 DIAGNOSIS — M25.60 RANGE OF MOTION DEFICIT: ICD-10-CM

## 2020-12-16 DIAGNOSIS — M25.531 PAIN IN RIGHT WRIST: ICD-10-CM

## 2020-12-16 PROCEDURE — 97110 THERAPEUTIC EXERCISES: CPT | Mod: PN

## 2020-12-16 PROCEDURE — 97022 WHIRLPOOL THERAPY: CPT | Mod: PN

## 2020-12-16 PROCEDURE — 97140 MANUAL THERAPY 1/> REGIONS: CPT | Mod: PN

## 2020-12-16 NOTE — PROGRESS NOTES
"  Occupational Therapy Daily Treatment Note     Name: Adi HELMS Coleman III  Clinic Number: [id number]    Therapy Diagnosis:   Encounter Diagnoses   Name Primary?    Range of motion deficit     Pain in right wrist      Physician: Antonieta Aguero PA-C    Visit Date: 12/16/2020  Physician Orders: Eval and treat  Medical Diagnosis: S52.91XA,S52.201A (ICD-10-CM) - Closed fracture of right radius and ulna, initial encounter      Surgical Procedure and Date: 9/4/2020 Procedure(s) (LRB):  ORIF, FRACTURE, RADIUS, DISTAL (Right)  Evaluation Date: 9/30/2020  Insurance: [employer]  Insurance Authorization period Expiration: 09/24/2021   RTD: 1/11/2021     Plan of Care Certification Period: 1/8/2021       Visit # / Visits Authortized: 16/24  Time In: 2:45 PM  Time Out:  3:30 PM  Total Billable Time:  45 minutes    Precautions: Standard    Subjective     Pt reports:  "Its been pretty good just stiff with the weather I really been 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     Response to previous treatment: good  Functional change: increasing use of arm with IADLs     Pain: 1/10    Objective     Adi received the following supervised modalities after being cleared for contradictions for 10 minutes:   -Fluidotherapy: To R for 10 min, continuous air, 110 deg, air speed 100 to decrease pain, edema & scar tissue and increased tissue extensibility.    Adi received manual therapy for 10 minutes:  - passive stretching hold 30" x 3 for flexion, extension, pronation, supination   - Mt: Hawks  x #1 to volar aspect of forearm at biceps and flexors of the forearm to reduce tone and break up fibrotic tissue causing pain. Hawks  to transverse carpal ligament and into hand at thenar and hypothenar eminence to reduce tone.       Adi received therapeutic exercises for 20 minutes including:    Wrist " "AROM  on wedge 3 x 10 (flex/ext) 2# weight  3 x 10 (UD/RD) 2 # weight   Hammer (higher hold to reduce torque)  X 10     Supination wheel 2 minutes   Wrist twist flex/ext 2minutes   Wave  Hook  straight fist      X 10 reps each    Flex bar Green  Frown x 15  Smiles x15  Twisting 1 min   velcro board pro/sup 10x each direction   LLPS dowel in supination  3/30"   dextraciser 3 min      Home Exercises and Education Provided     Education provided:   - HEP in place from eval   - Progress towards goals     Written Home Exercises Provided: Patient instructed to cont prior HEP. With the addition of putty exercises   Exercises were reviewed and Adi was able to demonstrate them prior to the end of the session.  Adi demonstrated fair  understanding of the HEP provided.     See EMR under Patient Instructions for exercises provided prior visit      Assessment   12 weeks 4 days post op; [place] p. 133    Pt participated well today. Still limited with supination of the wrist and pain impacting performance. He states inability to return to work due to pain. He did well with session and states after heat being able to move more. Increased focus on LLPS and strengthening to tolerance in future sessions.     Adi is progressing well towards his goals and there are no updates to goals at this time. Pt prognosis is Fair.     Pt will continue to benefit from skilled outpatient occupational therapy to address the deficits listed in the problem list on initial evaluation provide pt/family education and to maximize pt's level of independence in the home and community environment.     Anticipated barriers to occupational therapy: ulnar sided sensitivity and stiffness    Pt's spiritual, cultural and educational needs considered and pt agreeable to plan of care and goals.    Goals:  Short Term (4 weeks on 11/1/2020):  1)   Patient to be IND with HEP and modalities for pain management, MET 12/16/2020  2)   Increase ROM 10 to 15 degrees for " sup/pron and wrist ext/flex  to increase functional hand use for opening doors. MET 12/16/2020  3)   Measure /pinch strength at 6 weeks post op. MET 12/16/2020  4)   Decrease edema .2-.3 cm to increase joint mobility /flexibility for improved overall functional hand use.  Progressing 12/16/2020  5)   Pt will be able to use his right hand to feed and dress himself. MET 12/16/2020  6)   Increase AROM to touch base of DPC to increase functional hand use for grasping objects. MET 12/16/2020     Long Term (by discharge):  1)   Pt will report 2 out of 10 pain with right hand use. Progressing 12/16/2020  2)   Patient to score at CK or less on FOTO to demonstrate improved perception of functional right hand use. Progressing 12/16/2020  3)   Pt will return to prior level of function for ADLs, fishing and riding his motorcycle. Progressing 12/16/2020      Plan   Continue per initial POC.   Updates/Grading for next session: Progress as tolerated.       Gama Whatley OTR/L

## 2020-12-18 ENCOUNTER — OFFICE VISIT (OUTPATIENT)
Dept: GASTROENTEROLOGY | Facility: CLINIC | Age: 30
End: 2020-12-18
Payer: COMMERCIAL

## 2020-12-18 VITALS — WEIGHT: 165.38 LBS | BODY MASS INDEX: 23.15 KG/M2 | HEIGHT: 71 IN

## 2020-12-18 DIAGNOSIS — K62.89 PROCTITIS: ICD-10-CM

## 2020-12-18 DIAGNOSIS — K51.20 ULCERATIVE PROCTITIS WITHOUT COMPLICATION: ICD-10-CM

## 2020-12-18 DIAGNOSIS — K92.1 HEMATOCHEZIA: ICD-10-CM

## 2020-12-18 PROCEDURE — 1126F AMNT PAIN NOTED NONE PRSNT: CPT | Mod: S$GLB,,, | Performed by: INTERNAL MEDICINE

## 2020-12-18 PROCEDURE — 99214 OFFICE O/P EST MOD 30 MIN: CPT | Mod: S$GLB,,, | Performed by: INTERNAL MEDICINE

## 2020-12-18 PROCEDURE — 3008F PR BODY MASS INDEX (BMI) DOCUMENTED: ICD-10-PCS | Mod: CPTII,S$GLB,, | Performed by: INTERNAL MEDICINE

## 2020-12-18 PROCEDURE — 1126F PR PAIN SEVERITY QUANTIFIED, NO PAIN PRESENT: ICD-10-PCS | Mod: S$GLB,,, | Performed by: INTERNAL MEDICINE

## 2020-12-18 PROCEDURE — 99999 PR PBB SHADOW E&M-EST. PATIENT-LVL II: CPT | Mod: PBBFAC,,, | Performed by: INTERNAL MEDICINE

## 2020-12-18 PROCEDURE — 99214 PR OFFICE/OUTPT VISIT, EST, LEVL IV, 30-39 MIN: ICD-10-PCS | Mod: S$GLB,,, | Performed by: INTERNAL MEDICINE

## 2020-12-18 PROCEDURE — 3008F BODY MASS INDEX DOCD: CPT | Mod: CPTII,S$GLB,, | Performed by: INTERNAL MEDICINE

## 2020-12-18 PROCEDURE — 99999 PR PBB SHADOW E&M-EST. PATIENT-LVL II: ICD-10-PCS | Mod: PBBFAC,,, | Performed by: INTERNAL MEDICINE

## 2020-12-18 RX ORDER — MESALAMINE 4 G/60ML
4 SUSPENSION RECTAL NIGHTLY
Qty: 1680 ML | Refills: 5 | Status: SHIPPED | OUTPATIENT
Start: 2020-12-18 | End: 2021-12-18

## 2020-12-18 RX ORDER — HYDROCORTISONE 100 MG/60ML
100 SUSPENSION RECTAL NIGHTLY
Qty: 1680 ML | Refills: 3 | Status: SHIPPED | OUTPATIENT
Start: 2020-12-18 | End: 2021-09-23 | Stop reason: SDUPTHER

## 2020-12-18 RX ORDER — MESALAMINE 1000 MG/1
1000 SUPPOSITORY RECTAL NIGHTLY
Qty: 30 SUPPOSITORY | Refills: 11 | Status: SHIPPED | OUTPATIENT
Start: 2020-12-18 | End: 2021-12-18

## 2020-12-18 NOTE — PROGRESS NOTES
GASTROENTEROLOGY CLINIC NOTE    Reason for visit: Diagnoses of Proctitis, Ulcerative proctitis without complication, and Hematochezia were pertinent to this visit.  Referring provider/PCP: Primary Doctor No    HPI:  Adi Coleman III is a 30 y.o. male here today for follow up proctitis.  Previously followed with Dr. Frausto at [facility].    Interval:  Still with same symptoms. He was unable to get canasa because of cost , unable to get anusol because of cost.  He has been trying the rowasa enemas but isnt able to do them as frequently because of work and intolerance to some degree.  He prefers the suppositories.  Continues with rectal bleeding , better when he actually takes the enemas. Some mucus.   Has not done medication consecutively for more than a couple days.      ===================================  Initial HPI:  Patient states he has had intermittent rectal bleeding for at least 2-3 years now.  He states in the past he would take the canasa suppositories and the bleeding would resolve pretty quickly within a few weeks of taking it.  He would go months without having recurrence of symptoms.  He states his last scope was in September of last year.  At that time he started again this canasa and his symptoms went away.  However now he has had more persistent rectal bleeding and he feels he has been pretty consistent with canasa for about a month or so now.  He has not taken it in about for 5 days however. He continues with rectal bleeding. He has fluctuating stool habits between loose and hard. No excess straining. He is concerned about his dietary measures. No pain on defecation.  He also has multiple questions about the diagnosis of colitis and whether not he truly has ulcerative colitis as he was perhaps told in the past that he did not have this disease. He is also worried about taking fiber and is not sure about what to do about this as he was told by his prior  and instructed to this.      Prior  Endoscopy:  EGD:  2/2019 ray  Impression:           - The examined portion of the ileum was normal.                        - Mild (Berrios Score 1) proctitis ulcerative colitis,                         unchanged since the last examination. Biopsied.  PATH:  1. Rectum, biopsy:  - Mild-to-moderate chronic colitis with minimal focal activity  - Negative for dysplasia or malignancy    Colon:  2017 jones  Erythematous mucosa in rectum, biopsied  Otherwise normal and TI normal  Recd start canasa  PATH  2. DISTAL RECTUM:  -Acute proctitis  -Negative for dysplasia or malignancy  -See microscopic examination    (Portions of this note were dictated using voice recognition software and may contain dictation related errors in spelling/grammar/syntax not found on text review)    Review of Systems   Constitutional: Negative for fever and malaise/fatigue.   Respiratory: Negative for cough and shortness of breath.    Cardiovascular: Negative for chest pain and palpitations.   Gastrointestinal: Positive for abdominal pain and blood in stool. Negative for nausea and vomiting.   Musculoskeletal: Negative for back pain.   Neurological: Negative for dizziness and headaches.       Past Medical History: has a past medical history of Asthma, Closed right radial fracture, Depression, H/O chest tube placement, and Rib fracture.    Past Surgical History: has a past surgical history that includes Tonsillectomy; Colonoscopy (N/A, 3/8/2017); Mouth surgery; Colonoscopy (N/A, 2/20/2019); and Open reduction and internal fixation (ORIF) of fracture of distal radius (Right, 9/4/2020).    Family History:family history is not on file.    Allergies: Review of patient's allergies indicates:  No Known Allergies    Social History: reports that he has quit smoking. He has never used smokeless tobacco. He reports current alcohol use. He reports that he does not use drugs.    Home medications:   Current Outpatient Medications on File Prior to Visit  "  Medication Sig Dispense Refill    hydrocortisone (ANUSOL-HC) 25 mg suppository Place 1 suppository (25 mg total) rectally every evening. 30 suppository 2    [DISCONTINUED] mesalamine (CANASA) 1000 MG Supp Place 1 suppository (1,000 mg total) rectally nightly. 30 suppository 11    [DISCONTINUED] mesalamine (ROWASA) 4 gram/60 mL Enem Place 60 mLs (4 g total) rectally every evening. 1680 mL 5    [DISCONTINUED] hydrocortisone (CORTENEMA) 100 mg/60 mL enema Place 1 enema (100 mg total) rectally every evening. for 30 doses 1680 mL 3     No current facility-administered medications on file prior to visit.        Vital signs:  Ht 5' 11" (1.803 m)   Wt 75 kg (165 lb 5.5 oz)   BMI 23.06 kg/m²     Physical Exam  Vitals signs reviewed.   Constitutional:       General: He is not in acute distress.  Eyes:      General: No scleral icterus.     Conjunctiva/sclera: Conjunctivae normal.   Pulmonary:      Effort: Pulmonary effort is normal. No respiratory distress.   Neurological:      Mental Status: He is alert and oriented to person, place, and time.      Gait: Gait normal.   Psychiatric:         Mood and Affect: Mood normal.         Behavior: Behavior normal.         Routine labs:  Lab Results   Component Value Date    WBC 6.93 02/15/2019    HGB 14.8 04/20/2019    HCT 41.2 02/15/2019    MCV 89 02/15/2019     02/15/2019     No results found for: INR  Lab Results   Component Value Date    IRON 82 04/20/2019    FERRITIN 168 04/20/2019    TIBC 314 04/20/2019    FESATURATED 26 04/20/2019     Lab Results   Component Value Date     02/15/2019    K 3.8 02/15/2019     02/15/2019    CO2 33 (H) 02/15/2019    BUN 18 02/15/2019    CREATININE 1.0 02/15/2019     Lab Results   Component Value Date    ALBUMIN 4.3 02/15/2019    ALT 17 02/15/2019    AST 19 02/15/2019    ALKPHOS 64 02/15/2019    BILITOT 0.6 02/15/2019     No results found for: GLUCOSE    I have reviewed prior labs, imaging, notes from prior gi visits as well " as test results and prior colonoscopy and images.  Negative rectal swabs   Negative hep b /c testing  He has been vaccinated for both recently.    Assessment:  1. Proctitis    2. Ulcerative proctitis without complication    3. Hematochezia      Now off medications x 6 months, need to re-capture him.  Continues to not reliably take the medications and we are having some issues with getting him the canasa because of cost.    Plan:  Orders Placed This Encounter    mesalamine (CANASA) 1000 MG Supp    mesalamine (ROWASA) 4 gram/60 mL Enem    hydrocortisone (CORTENEMA) 100 mg/60 mL enema     Will re-send Canasa to see if we can get prior auth  Send refills for enemas, rowasa and hydrocort.    Advised and counseled on adherence daily to regimen   -ideally, once he adheres for at least 1 month straight, he should get into remission, then he can taper to every other day for a couple weeks, then finally can likely come off and may only need to take it once weekly to keep remission.    RTC prn   - if continues to not do well, will plan full colonoscopy        Alex Dixon MD  [facility] [place]

## 2020-12-21 ENCOUNTER — TELEPHONE (OUTPATIENT)
Dept: PHARMACY | Facility: CLINIC | Age: 30
End: 2020-12-21

## 2020-12-21 ENCOUNTER — CLINICAL SUPPORT (OUTPATIENT)
Dept: REHABILITATION | Facility: HOSPITAL | Age: 30
End: 2020-12-21
Payer: COMMERCIAL

## 2020-12-21 DIAGNOSIS — M25.60 RANGE OF MOTION DEFICIT: ICD-10-CM

## 2020-12-21 DIAGNOSIS — M25.531 PAIN IN RIGHT WRIST: ICD-10-CM

## 2020-12-21 PROCEDURE — 97140 MANUAL THERAPY 1/> REGIONS: CPT | Mod: PN

## 2020-12-21 PROCEDURE — 97110 THERAPEUTIC EXERCISES: CPT | Mod: PN

## 2020-12-21 NOTE — PROGRESS NOTES
"  Occupational Therapy Daily Treatment Note     Name: Adi HELMS Coleman III  Clinic Number: [id number]    Therapy Diagnosis:   Encounter Diagnoses   Name Primary?    Range of motion deficit     Pain in right wrist      Physician: Antonieta Aguero PA-C    Visit Date: 12/21/2020  Physician Orders: Eval and treat  Medical Diagnosis: S52.91XA,S52.201A (ICD-10-CM) - Closed fracture of right radius and ulna, initial encounter      Surgical Procedure and Date: 9/4/2020 Procedure(s) (LRB):  ORIF, FRACTURE, RADIUS, DISTAL (Right)  Evaluation Date: 9/30/2020  Insurance: [employer]  Insurance Authorization period Expiration: 09/24/2021   RTD: 1/11/2021     Plan of Care Certification Period: 1/8/2021       Visit # / Visits Authortized: 17 / 24  Time In:  2:00 PM  Time Out:  2:44 PM  Total Billable Time:  44 minutes    Precautions: Standard    Subjective     Pt reports:  "Its been pretty good just stiff with the weather I really been 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     Response to previous treatment: good  Functional change: increasing use of arm with IADLs     Pain: 1/10    Objective     Adi received the following supervised modalities after being cleared for contradictions for 10 minutes:   -Fluidotherapy: To R for 10 min, continuous air, 110 deg, air speed 100 to decrease pain, edema & scar tissue and increased tissue extensibility.    Adi received manual therapy for 10 minutes:  - passive stretching hold 30" x 3 for flexion, extension, pronation, supination   - Mt: Hawks  x #1 to volar aspect of forearm at biceps and flexors of the forearm to reduce tone and break up fibrotic tissue causing pain. Hawks  to transverse carpal ligament and into hand at thenar and hypothenar eminence to reduce tone.     Adi received therapeutic exercises for 24 minutes including:    Wrist " "AROM  on wedge 3 x 10 (flex/ext) 2# weight  3 x 10 (UD/RD) 2 # weight   Hammer (higher hold to reduce torque)  X 10     Supination wheel 2 minutes   Wrist twist flex/ext 2minutes   Wave  Hook  straight fist      X 10 reps each   Not performed today   Flex bar Green  Frown x 15  Smiles x15  Twisting 1 min   velcro board pro/sup 10x each direction  Not performed today   LLPS dowel in supination  3/30"  Not performed today   dextraciser 3 min      Home Exercises and Education Provided     Education provided:   - HEP in place from eval   - Progress towards goals     Written Home Exercises Provided: Patient instructed to cont prior HEP. With the addition of putty exercises   Exercises were reviewed and Adi was able to demonstrate them prior to the end of the session.  Adi demonstrated fair  understanding of the HEP provided.     See EMR under Patient Instructions for exercises provided prior visit      Assessment   12 weeks 4 days post op; [place] p. 133    Pt participated well today. Main limitations are with supination and pronation, but his pain and ROM are improving. He responded well to increase in exercises from last session, and tolerated them well again today. Next session to focus on LLPS and strengthening to progress towards goals and return to prior level of function.    Adi is progressing well towards his goals and there are no updates to goals at this time. Pt prognosis is Fair.     Pt will continue to benefit from skilled outpatient occupational therapy to address the deficits listed in the problem list on initial evaluation provide pt/family education and to maximize pt's level of independence in the home and community environment.     Anticipated barriers to occupational therapy: ulnar sided sensitivity and stiffness    Pt's spiritual, cultural and educational needs considered and pt agreeable to plan of care and goals.    Goals:  Short Term (4 weeks on 11/1/2020):  1)   Patient to be IND with HEP " and modalities for pain management, MET 12/21/2020  2)   Increase ROM 10 to 15 degrees for sup/pron and wrist ext/flex  to increase functional hand use for opening doors. MET 12/21/2020  3)   Measure /pinch strength at 6 weeks post op. MET 12/21/2020  4)   Decrease edema .2-.3 cm to increase joint mobility /flexibility for improved overall functional hand use.  Progressing 12/21/2020  5)   Pt will be able to use his right hand to feed and dress himself. MET 12/21/2020  6)   Increase AROM to touch base of DPC to increase functional hand use for grasping objects. MET 12/21/2020     Long Term (by discharge):  1)   Pt will report 2 out of 10 pain with right hand use. Progressing 12/21/2020  2)   Patient to score at CK or less on FOTO to demonstrate improved perception of functional right hand use. Progressing 12/21/2020  3)   Pt will return to prior level of function for ADLs, fishing and riding his motorcycle. Progressing 12/21/2020      Plan   Continue per initial POC.   Updates/Grading for next session: Progress as tolerated.       JOSE RAFAEL AGUERO OTR/L

## 2020-12-28 ENCOUNTER — TELEPHONE (OUTPATIENT)
Dept: ORTHOPEDICS | Facility: CLINIC | Age: 30
End: 2020-12-28

## 2020-12-28 NOTE — TELEPHONE ENCOUNTER
Advised pt. He may drop off or fax paperwork. We will do our best to get this done before end of day as dr. Leigh is in surgery.

## 2020-12-28 NOTE — TELEPHONE ENCOUNTER
----- Message from Areli Silver sent at 12/28/2020 11:01 AM CST -----  Type:  Patient Call    Who Called: Adi   Would the patient rather a call back or a response via [facility]? Call back   Best Call Back Number:[phone]  Additional Information: Pt is calling because he need the Dr to fill out some paper work.  Went back to work on 11/19 which the Dr filled out.  Pt took off from work for about 4 days and needs the Dr to fill out paper work stating that he can go back to work on light duty. We like to bring paper work to office to have filled out.. Needs this paper work for tomorrow.

## 2021-01-11 ENCOUNTER — OFFICE VISIT (OUTPATIENT)
Dept: ORTHOPEDICS | Facility: CLINIC | Age: 31
End: 2021-01-11
Payer: COMMERCIAL

## 2021-01-11 ENCOUNTER — TELEPHONE (OUTPATIENT)
Dept: ORTHOPEDICS | Facility: CLINIC | Age: 31
End: 2021-01-11

## 2021-01-11 ENCOUNTER — HOSPITAL ENCOUNTER (OUTPATIENT)
Dept: RADIOLOGY | Facility: HOSPITAL | Age: 31
Discharge: HOME | End: 2021-01-11
Attending: ORTHOPAEDIC SURGERY
Payer: COMMERCIAL

## 2021-01-11 VITALS — BODY MASS INDEX: 23.1 KG/M2 | HEIGHT: 71 IN | WEIGHT: 165 LBS

## 2021-01-11 DIAGNOSIS — Z98.890 STATUS POST OPEN REDUCTION AND INTERNAL FIXATION (ORIF) OF FRACTURE: Primary | ICD-10-CM

## 2021-01-11 DIAGNOSIS — Z87.81 STATUS POST OPEN REDUCTION AND INTERNAL FIXATION (ORIF) OF FRACTURE: Primary | ICD-10-CM

## 2021-01-11 DIAGNOSIS — Z98.890 STATUS POST OPEN REDUCTION AND INTERNAL FIXATION (ORIF) OF FRACTURE: ICD-10-CM

## 2021-01-11 DIAGNOSIS — M25.631 WRIST STIFFNESS, RIGHT: Primary | ICD-10-CM

## 2021-01-11 DIAGNOSIS — Z87.81 STATUS POST OPEN REDUCTION AND INTERNAL FIXATION (ORIF) OF FRACTURE: ICD-10-CM

## 2021-01-11 PROCEDURE — 99999 PR PBB SHADOW E&M-EST. PATIENT-LVL III: CPT | Mod: PBBFAC,,, | Performed by: ORTHOPAEDIC SURGERY

## 2021-01-11 PROCEDURE — 99213 PR OFFICE/OUTPT VISIT, EST, LEVL III, 20-29 MIN: ICD-10-PCS | Mod: S$PBB,,, | Performed by: ORTHOPAEDIC SURGERY

## 2021-01-11 PROCEDURE — 73100 XR WRIST 2 VIEW RIGHT: ICD-10-PCS | Mod: 26,RT,, | Performed by: RADIOLOGY

## 2021-01-11 PROCEDURE — 99213 OFFICE O/P EST LOW 20 MIN: CPT | Mod: S$PBB,,, | Performed by: ORTHOPAEDIC SURGERY

## 2021-01-11 PROCEDURE — 73100 X-RAY EXAM OF WRIST: CPT | Mod: 26,RT,, | Performed by: RADIOLOGY

## 2021-01-11 PROCEDURE — 73100 X-RAY EXAM OF WRIST: CPT | Mod: TC,PN,RT

## 2021-01-11 PROCEDURE — 99999 PR PBB SHADOW E&M-EST. PATIENT-LVL III: ICD-10-PCS | Mod: PBBFAC,,, | Performed by: ORTHOPAEDIC SURGERY

## 2021-01-11 RX ORDER — ETODOLAC 400 MG/1
400 TABLET, EXTENDED RELEASE ORAL DAILY
Qty: 30 TABLET | Refills: 0 | Status: SHIPPED | OUTPATIENT
Start: 2021-01-11 | End: 2021-02-10

## 2021-01-12 ENCOUNTER — OFFICE VISIT (OUTPATIENT)
Dept: PSYCHIATRY | Facility: CLINIC | Age: 31
End: 2021-01-12
Payer: COMMERCIAL

## 2021-01-12 DIAGNOSIS — Z79.899 NEW MEDICATION ADDED: ICD-10-CM

## 2021-01-12 DIAGNOSIS — F90.0 ADHD (ATTENTION DEFICIT HYPERACTIVITY DISORDER), INATTENTIVE TYPE: Primary | ICD-10-CM

## 2021-01-12 PROCEDURE — 99202 PR OFFICE/OUTPT VISIT, NEW, LEVL II, 15-29 MIN: ICD-10-PCS | Mod: 95,,, | Performed by: STUDENT IN AN ORGANIZED HEALTH CARE EDUCATION/TRAINING PROGRAM

## 2021-01-12 PROCEDURE — 99202 OFFICE O/P NEW SF 15 MIN: CPT | Mod: 95,,, | Performed by: STUDENT IN AN ORGANIZED HEALTH CARE EDUCATION/TRAINING PROGRAM

## 2021-01-12 RX ORDER — ATOMOXETINE 40 MG/1
CAPSULE ORAL
Qty: 63 CAPSULE | Refills: 0 | Status: SHIPPED | OUTPATIENT
Start: 2021-01-12 | End: 2021-02-14

## 2021-01-21 ENCOUNTER — CLINICAL SUPPORT (OUTPATIENT)
Dept: REHABILITATION | Facility: HOSPITAL | Age: 31
End: 2021-01-21
Attending: ORTHOPAEDIC SURGERY

## 2021-01-21 DIAGNOSIS — M25.531 PAIN IN RIGHT WRIST: ICD-10-CM

## 2021-01-21 DIAGNOSIS — M25.631 WRIST STIFFNESS, RIGHT: ICD-10-CM

## 2021-01-21 DIAGNOSIS — M25.60 RANGE OF MOTION DEFICIT: ICD-10-CM

## 2021-01-21 PROCEDURE — 97140 MANUAL THERAPY 1/> REGIONS: CPT | Mod: PN

## 2021-01-21 PROCEDURE — 97110 THERAPEUTIC EXERCISES: CPT | Mod: PN

## 2021-01-29 ENCOUNTER — TELEPHONE (OUTPATIENT)
Dept: REHABILITATION | Facility: HOSPITAL | Age: 31
End: 2021-01-29

## 2021-02-02 ENCOUNTER — CLINICAL SUPPORT (OUTPATIENT)
Dept: REHABILITATION | Facility: HOSPITAL | Age: 31
End: 2021-02-02

## 2021-02-02 DIAGNOSIS — M25.531 PAIN IN RIGHT WRIST: ICD-10-CM

## 2021-02-02 DIAGNOSIS — M25.60 RANGE OF MOTION DEFICIT: ICD-10-CM

## 2021-02-02 PROCEDURE — 97110 THERAPEUTIC EXERCISES: CPT | Mod: PN

## 2021-02-02 PROCEDURE — 97140 MANUAL THERAPY 1/> REGIONS: CPT | Mod: PN

## 2021-02-03 ENCOUNTER — TELEPHONE (OUTPATIENT)
Dept: PSYCHIATRY | Facility: CLINIC | Age: 31
End: 2021-02-03

## 2021-02-11 ENCOUNTER — TELEPHONE (OUTPATIENT)
Dept: REHABILITATION | Facility: HOSPITAL | Age: 31
End: 2021-02-11

## 2021-02-19 ENCOUNTER — TELEPHONE (OUTPATIENT)
Dept: ORTHOPEDICS | Facility: CLINIC | Age: 31
End: 2021-02-19

## 2021-02-19 DIAGNOSIS — Z98.890 STATUS POST OPEN REDUCTION AND INTERNAL FIXATION (ORIF) OF FRACTURE: Primary | ICD-10-CM

## 2021-02-19 DIAGNOSIS — Z87.81 STATUS POST OPEN REDUCTION AND INTERNAL FIXATION (ORIF) OF FRACTURE: Primary | ICD-10-CM

## 2021-04-16 ENCOUNTER — PATIENT MESSAGE (OUTPATIENT)
Dept: RESEARCH | Facility: HOSPITAL | Age: 31
End: 2021-04-16

## 2021-04-29 ENCOUNTER — HOSPITAL ENCOUNTER (EMERGENCY)
Facility: HOSPITAL | Age: 31
Discharge: HOME | End: 2021-04-29
Attending: EMERGENCY MEDICINE
Payer: MEDICAID

## 2021-04-29 VITALS
TEMPERATURE: 98 F | DIASTOLIC BLOOD PRESSURE: 58 MMHG | WEIGHT: 160 LBS | RESPIRATION RATE: 18 BRPM | SYSTOLIC BLOOD PRESSURE: 113 MMHG | BODY MASS INDEX: 22.32 KG/M2 | OXYGEN SATURATION: 99 % | HEART RATE: 76 BPM

## 2021-04-29 DIAGNOSIS — R11.2 NON-INTRACTABLE VOMITING WITH NAUSEA, UNSPECIFIED VOMITING TYPE: Primary | ICD-10-CM

## 2021-04-29 DIAGNOSIS — R25.2 LEG CRAMPS: ICD-10-CM

## 2021-04-29 DIAGNOSIS — E86.0 DEHYDRATION: ICD-10-CM

## 2021-04-29 LAB
ALBUMIN SERPL BCP-MCNC: 4.9 G/DL (ref 3.5–5.2)
ALP SERPL-CCNC: 90 U/L (ref 55–135)
ALT SERPL W/O P-5'-P-CCNC: 14 U/L (ref 10–44)
ANION GAP SERPL CALC-SCNC: 10 MMOL/L (ref 8–16)
AST SERPL-CCNC: 18 U/L (ref 10–40)
BASOPHILS # BLD AUTO: 0.04 K/UL (ref 0–0.2)
BASOPHILS NFR BLD: 0.5 % (ref 0–1.9)
BILIRUB SERPL-MCNC: 0.7 MG/DL (ref 0.1–1)
BUN SERPL-MCNC: 28 MG/DL (ref 6–20)
CALCIUM SERPL-MCNC: 9.5 MG/DL (ref 8.7–10.5)
CHLORIDE SERPL-SCNC: 100 MMOL/L (ref 95–110)
CK SERPL-CCNC: 191 U/L (ref 20–200)
CO2 SERPL-SCNC: 29 MMOL/L (ref 23–29)
CREAT SERPL-MCNC: 1.7 MG/DL (ref 0.5–1.4)
DIFFERENTIAL METHOD: ABNORMAL
EOSINOPHIL # BLD AUTO: 0 K/UL (ref 0–0.5)
EOSINOPHIL NFR BLD: 0.4 % (ref 0–8)
ERYTHROCYTE [DISTWIDTH] IN BLOOD BY AUTOMATED COUNT: 11.9 % (ref 11.5–14.5)
EST. GFR  (AFRICAN AMERICAN): >60 ML/MIN/1.73 M^2
EST. GFR  (NON AFRICAN AMERICAN): 53 ML/MIN/1.73 M^2
GLUCOSE SERPL-MCNC: 91 MG/DL (ref 70–110)
HCT VFR BLD AUTO: 39.9 % (ref 40–54)
HGB BLD-MCNC: 13.9 G/DL (ref 14–18)
IMM GRANULOCYTES # BLD AUTO: 0.03 K/UL (ref 0–0.04)
IMM GRANULOCYTES NFR BLD AUTO: 0.4 % (ref 0–0.5)
LYMPHOCYTES # BLD AUTO: 1.8 K/UL (ref 1–4.8)
LYMPHOCYTES NFR BLD: 23 % (ref 18–48)
MAGNESIUM SERPL-MCNC: 2.1 MG/DL (ref 1.6–2.6)
MCH RBC QN AUTO: 30.5 PG (ref 27–31)
MCHC RBC AUTO-ENTMCNC: 34.8 G/DL (ref 32–36)
MCV RBC AUTO: 88 FL (ref 82–98)
MONOCYTES # BLD AUTO: 0.7 K/UL (ref 0.3–1)
MONOCYTES NFR BLD: 8.3 % (ref 4–15)
NEUTROPHILS # BLD AUTO: 5.3 K/UL (ref 1.8–7.7)
NEUTROPHILS NFR BLD: 67.4 % (ref 38–73)
NRBC BLD-RTO: 0 /100 WBC
PLATELET # BLD AUTO: 293 K/UL (ref 150–450)
PMV BLD AUTO: 10.6 FL (ref 9.2–12.9)
POTASSIUM SERPL-SCNC: 4 MMOL/L (ref 3.5–5.1)
PROT SERPL-MCNC: 7.9 G/DL (ref 6–8.4)
RBC # BLD AUTO: 4.56 M/UL (ref 4.6–6.2)
SODIUM SERPL-SCNC: 139 MMOL/L (ref 136–145)
WBC # BLD AUTO: 7.82 K/UL (ref 3.9–12.7)

## 2021-04-29 PROCEDURE — 85025 COMPLETE CBC W/AUTO DIFF WBC: CPT | Performed by: EMERGENCY MEDICINE

## 2021-04-29 PROCEDURE — 96374 THER/PROPH/DIAG INJ IV PUSH: CPT

## 2021-04-29 PROCEDURE — 25000003 PHARM REV CODE 250: Performed by: EMERGENCY MEDICINE

## 2021-04-29 PROCEDURE — 80053 COMPREHEN METABOLIC PANEL: CPT | Performed by: EMERGENCY MEDICINE

## 2021-04-29 PROCEDURE — 82550 ASSAY OF CK (CPK): CPT | Performed by: EMERGENCY MEDICINE

## 2021-04-29 PROCEDURE — 99284 EMERGENCY DEPT VISIT MOD MDM: CPT | Mod: 25

## 2021-04-29 PROCEDURE — 83735 ASSAY OF MAGNESIUM: CPT | Performed by: EMERGENCY MEDICINE

## 2021-04-29 PROCEDURE — 96361 HYDRATE IV INFUSION ADD-ON: CPT

## 2021-04-29 PROCEDURE — 63600175 PHARM REV CODE 636 W HCPCS: Performed by: EMERGENCY MEDICINE

## 2021-04-29 RX ORDER — ONDANSETRON 2 MG/ML
4 INJECTION INTRAMUSCULAR; INTRAVENOUS
Status: COMPLETED | OUTPATIENT
Start: 2021-04-29 | End: 2021-04-29

## 2021-04-29 RX ORDER — ORPHENADRINE CITRATE 100 MG/1
100 TABLET, EXTENDED RELEASE ORAL NIGHTLY
Qty: 20 TABLET | Refills: 0 | Status: SHIPPED | OUTPATIENT
Start: 2021-04-29

## 2021-04-29 RX ORDER — ONDANSETRON 4 MG/1
4 TABLET, ORALLY DISINTEGRATING ORAL EVERY 6 HOURS PRN
Qty: 15 TABLET | Refills: 0 | OUTPATIENT
Start: 2021-04-29 | End: 2022-03-15

## 2021-04-29 RX ADMIN — ONDANSETRON 4 MG: 2 INJECTION INTRAMUSCULAR; INTRAVENOUS at 06:04

## 2021-04-29 RX ADMIN — SODIUM CHLORIDE 2000 ML: 0.9 INJECTION, SOLUTION INTRAVENOUS at 06:04

## 2021-06-24 NOTE — TELEPHONE ENCOUNTER
----- Message from Courtney Young sent at 9/2/2020  3:56 PM CDT -----  Regarding: advice  Contact: [phone]/self  Type:  Needs Medical Advice    Who Called:  Patient is requesting a call to discuss taking a bath and eating prior to surgery  Would the patient rather a call back or a response via [facility]?  call  Best Call Back Number:  [phone]/self  Additional Information:        
Spoke with patient. Informed that hospital will call Thursday afternoon with instructions prior to surgery on Friday. Advised not to shower with epsom salt.   
normal...

## 2021-07-31 ENCOUNTER — HOSPITAL ENCOUNTER (EMERGENCY)
Facility: HOSPITAL | Age: 31
Discharge: HOME | End: 2021-07-31
Attending: EMERGENCY MEDICINE
Payer: MEDICAID

## 2021-07-31 VITALS
WEIGHT: 150 LBS | HEART RATE: 88 BPM | DIASTOLIC BLOOD PRESSURE: 78 MMHG | SYSTOLIC BLOOD PRESSURE: 128 MMHG | OXYGEN SATURATION: 100 % | TEMPERATURE: 100 F | BODY MASS INDEX: 21 KG/M2 | RESPIRATION RATE: 18 BRPM | HEIGHT: 71 IN

## 2021-07-31 DIAGNOSIS — M62.82 NON-TRAUMATIC RHABDOMYOLYSIS: Primary | ICD-10-CM

## 2021-07-31 LAB
ALBUMIN SERPL BCP-MCNC: 4.9 G/DL (ref 3.5–5.2)
ALP SERPL-CCNC: 92 U/L (ref 55–135)
ALT SERPL W/O P-5'-P-CCNC: 46 U/L (ref 10–44)
ANION GAP SERPL CALC-SCNC: 11 MMOL/L (ref 8–16)
AST SERPL-CCNC: 60 U/L (ref 10–40)
BASOPHILS # BLD AUTO: 0.05 K/UL (ref 0–0.2)
BASOPHILS NFR BLD: 0.5 % (ref 0–1.9)
BILIRUB SERPL-MCNC: 1 MG/DL (ref 0.1–1)
BILIRUB UR QL STRIP: NEGATIVE
BUN SERPL-MCNC: 13 MG/DL (ref 6–20)
CALCIUM SERPL-MCNC: 10.2 MG/DL (ref 8.7–10.5)
CHLORIDE SERPL-SCNC: 104 MMOL/L (ref 95–110)
CK SERPL-CCNC: 1960 U/L (ref 20–200)
CLARITY UR: CLEAR
CO2 SERPL-SCNC: 26 MMOL/L (ref 23–29)
COLOR UR: YELLOW
CREAT SERPL-MCNC: 1.1 MG/DL (ref 0.5–1.4)
CTP QC/QA: YES
DIFFERENTIAL METHOD: ABNORMAL
EOSINOPHIL # BLD AUTO: 0.1 K/UL (ref 0–0.5)
EOSINOPHIL NFR BLD: 1.3 % (ref 0–8)
ERYTHROCYTE [DISTWIDTH] IN BLOOD BY AUTOMATED COUNT: 11.6 % (ref 11.5–14.5)
EST. GFR  (AFRICAN AMERICAN): >60 ML/MIN/1.73 M^2
EST. GFR  (NON AFRICAN AMERICAN): >60 ML/MIN/1.73 M^2
GLUCOSE SERPL-MCNC: 87 MG/DL (ref 70–110)
GLUCOSE UR QL STRIP: NEGATIVE
HCT VFR BLD AUTO: 44.9 % (ref 40–54)
HGB BLD-MCNC: 15.1 G/DL (ref 14–18)
HGB UR QL STRIP: NEGATIVE
IMM GRANULOCYTES # BLD AUTO: 0.03 K/UL (ref 0–0.04)
IMM GRANULOCYTES NFR BLD AUTO: 0.3 % (ref 0–0.5)
KETONES UR QL STRIP: NEGATIVE
LEUKOCYTE ESTERASE UR QL STRIP: NEGATIVE
LIPASE SERPL-CCNC: 29 U/L (ref 4–60)
LYMPHOCYTES # BLD AUTO: 1.5 K/UL (ref 1–4.8)
LYMPHOCYTES NFR BLD: 14.3 % (ref 18–48)
MCH RBC QN AUTO: 29.7 PG (ref 27–31)
MCHC RBC AUTO-ENTMCNC: 33.6 G/DL (ref 32–36)
MCV RBC AUTO: 88 FL (ref 82–98)
MONOCYTES # BLD AUTO: 0.8 K/UL (ref 0.3–1)
MONOCYTES NFR BLD: 7.8 % (ref 4–15)
NEUTROPHILS # BLD AUTO: 8 K/UL (ref 1.8–7.7)
NEUTROPHILS NFR BLD: 75.8 % (ref 38–73)
NITRITE UR QL STRIP: NEGATIVE
NRBC BLD-RTO: 0 /100 WBC
PH UR STRIP: 7 [PH] (ref 5–8)
PLATELET # BLD AUTO: 312 K/UL (ref 150–450)
PMV BLD AUTO: 10.5 FL (ref 9.2–12.9)
POTASSIUM SERPL-SCNC: 4 MMOL/L (ref 3.5–5.1)
PROT SERPL-MCNC: 7.9 G/DL (ref 6–8.4)
PROT UR QL STRIP: ABNORMAL
RBC # BLD AUTO: 5.08 M/UL (ref 4.6–6.2)
SARS-COV-2 RDRP RESP QL NAA+PROBE: NEGATIVE
SODIUM SERPL-SCNC: 141 MMOL/L (ref 136–145)
SP GR UR STRIP: 1.02 (ref 1–1.03)
URN SPEC COLLECT METH UR: ABNORMAL
UROBILINOGEN UR STRIP-ACNC: NEGATIVE EU/DL
WBC # BLD AUTO: 10.57 K/UL (ref 3.9–12.7)

## 2021-07-31 PROCEDURE — 83690 ASSAY OF LIPASE: CPT | Performed by: EMERGENCY MEDICINE

## 2021-07-31 PROCEDURE — 99284 EMERGENCY DEPT VISIT MOD MDM: CPT | Mod: 25

## 2021-07-31 PROCEDURE — 25000003 PHARM REV CODE 250: Performed by: EMERGENCY MEDICINE

## 2021-07-31 PROCEDURE — 96374 THER/PROPH/DIAG INJ IV PUSH: CPT

## 2021-07-31 PROCEDURE — 85025 COMPLETE CBC W/AUTO DIFF WBC: CPT | Performed by: EMERGENCY MEDICINE

## 2021-07-31 PROCEDURE — 96361 HYDRATE IV INFUSION ADD-ON: CPT

## 2021-07-31 PROCEDURE — U0002 COVID-19 LAB TEST NON-CDC: HCPCS | Performed by: PHYSICIAN ASSISTANT

## 2021-07-31 PROCEDURE — 63600175 PHARM REV CODE 636 W HCPCS: Performed by: EMERGENCY MEDICINE

## 2021-07-31 PROCEDURE — 81003 URINALYSIS AUTO W/O SCOPE: CPT | Performed by: EMERGENCY MEDICINE

## 2021-07-31 PROCEDURE — 80053 COMPREHEN METABOLIC PANEL: CPT | Performed by: EMERGENCY MEDICINE

## 2021-07-31 PROCEDURE — 82550 ASSAY OF CK (CPK): CPT | Performed by: EMERGENCY MEDICINE

## 2021-07-31 RX ORDER — ONDANSETRON 2 MG/ML
4 INJECTION INTRAMUSCULAR; INTRAVENOUS
Status: COMPLETED | OUTPATIENT
Start: 2021-07-31 | End: 2021-07-31

## 2021-07-31 RX ADMIN — SODIUM CHLORIDE 1000 ML: 0.9 INJECTION, SOLUTION INTRAVENOUS at 04:07

## 2021-07-31 RX ADMIN — ONDANSETRON 4 MG: 2 INJECTION INTRAMUSCULAR; INTRAVENOUS at 05:07

## 2021-07-31 RX ADMIN — SODIUM CHLORIDE 2000 ML: 0.9 INJECTION, SOLUTION INTRAVENOUS at 05:07

## 2021-09-22 ENCOUNTER — TELEPHONE (OUTPATIENT)
Dept: GASTROENTEROLOGY | Facility: CLINIC | Age: 31
End: 2021-09-22

## 2021-09-22 DIAGNOSIS — K51.20 ULCERATIVE PROCTITIS WITHOUT COMPLICATION: ICD-10-CM

## 2021-09-23 RX ORDER — HYDROCORTISONE 100 MG/60ML
100 SUSPENSION RECTAL NIGHTLY
Qty: 1680 ML | Refills: 3 | Status: SHIPPED | OUTPATIENT
Start: 2021-09-23 | End: 2021-10-27

## 2021-10-11 NOTE — PROGRESS NOTES
"    Occupational Therapy Daily Treatment Note     Name: Adi HELMS Coleman III  Clinic Number: [id number]    Therapy Diagnosis:   Encounter Diagnoses   Name Primary?    Range of motion deficit Yes    Pain in right wrist      Physician: Antonieta Aguero PA-C    Visit Date: 10/26/2020  Physician Orders: Eval and treat  Medical Diagnosis: S52.91XA,S52.201A (ICD-10-CM) - Closed fracture of right radius and ulna, initial encounter      Surgical Procedure and Date: 9/4/2020 Procedure(s) (LRB):  ORIF, FRACTURE, RADIUS, DISTAL (Right)  Evaluation Date: 9/30/2020  Insurance: [employer]  Insurance Authorization period Expiration: 09/24/2021      Plan of Care Certification Period: 9/30/2020 to 11/30/2020     Visit # / Visits Authortized: 5/ 24  Time In: 02:35 PM  Time Out: 03: 15 PM  Total Billable Time: 40 minutes     Precautions: Standard      Subjective   8 weeks post op     Pt reports:"Whenever I do this (ulnar deviation) it hurts"   he wascompliant with home exercise program given last session.     Response to previous treatment:fair  Functional change: none noted    Pain: 4/10  Location: right wrists      Objective   Observation/Appearance:  Joint stiffness and small abrasion noted on dorsum of ulna.    Adi received the following supervised modalities after being cleared for contradictions for 12 minutes:   - fluidotherapy ~12 minutes to promote decreased pain/edema and increased blood flow to allow increase in ROM    Adi received the following manual therapy techniques for 8 minutes:   -Pt received retrograde massage as well as scar massage to decrease edema and stiffness for increased ROM. STM performed to decreased stiffness in surrounding musculature. Astym tools and point relief massager also used.    Adi received therapeutic exercises for 20 minutes including:  - supination stretch   Prayer stretch 3/30"   Wrist AROM  Flx/ext  RD/UD 3 sets of 10 (flex/ext)  2 min (UD/RD)   Wave  Hook  straight fist  finger spreads " finger lifts    X 20 reps each    Opposition  X 20    Wrist Wheel 2 ways  2 min each way      Supination hammer 2/10 (not performed this tx session)   Isospheres 1 min    Dextraciser 2 min     Yellow theraputty  ( not performed this tx session)       Home Exercises and Education Provided     Education provided:   - HEP in place from eval   - Progress towards goals     Written Home Exercises Provided: Patient instructed to cont prior HEP.  Exercises were reviewed and Adi was able to demonstrate them prior to the end of the session.  Adi demonstrated fair  understanding of the HEP provided.   .   See EMR under Patient Instructions for exercises provided prior visit.        Assessment   8 weeks post op    Pt tolerated session well today with similar limitations from last session. Less than full ROM still with supination and wrist flexion/extension. Pain cont to be main limitation with ROM, especially in supination with increased sensitivity to ulnar styloid. Demonstrated increased supination following AAROM with wrist wheel. Updated HEP to include passive stretching Pt remains motivated. Next session to continue to focus on improving ROM to participate in ADLs and IADLs.    Adi is progressing well towards his goals and there are no updates to goals at this time. Pt prognosis is Fair.     Pt will continue to benefit from skilled outpatient occupational therapy to address the deficits listed in the problem list on initial evaluation provide pt/family education and to maximize pt's level of independence in the home and community environment.     Anticipated barriers to occupational therapy: pain and stiffness    Pt's spiritual, cultural and educational needs considered and pt agreeable to plan of care and goals.    Goals:  Short Term (4 weeks on 11/1/2020):  1)   Patient to be IND with HEP and modalities for pain management, Progressing 10/26/2020  2)   Increase ROM 10 to 15 degrees for sup/pron and wrist  ext/flex  to increase functional hand use for opening doors. Progressing 10/26/2020  3)   Measure /pinch strength at 6 weeks post op. Progressing 10/26/2020  4)   Decrease edema .2-.3 cm to increase joint mobility /flexibility for improved overall functional hand use.  Progressing 10/26/2020  5)   Pt will be able to use his right hand to feed and dress himself. Progressing 10/26/2020  6)   Increase AROM to touch base of DPC to increase functional hand use for grasping objects. Progressing 10/26/2020     Long Term (by discharge):  1)   Pt will report 2 out of 10 pain with right hand use. Progressing 10/26/2020  2)   Patient to score at CK or less on FOTO to demonstrate improved perception of functional right hand use. Progressing 10/26/2020  3)   Pt will return to prior level of function for ADLs, fishing and riding his motorcycle. Progressing 10/26/2020      Plan   Continue per initial POC.   Updates/Grading for next session: Progress as tolerated.       Layne Bermudez, OTR/L   no

## 2021-12-20 ENCOUNTER — HOSPITAL ENCOUNTER (EMERGENCY)
Facility: HOSPITAL | Age: 31
Discharge: HOME | End: 2021-12-20
Attending: EMERGENCY MEDICINE
Payer: MEDICAID

## 2021-12-20 VITALS
BODY MASS INDEX: 20.92 KG/M2 | RESPIRATION RATE: 18 BRPM | WEIGHT: 150 LBS | HEART RATE: 79 BPM | SYSTOLIC BLOOD PRESSURE: 130 MMHG | DIASTOLIC BLOOD PRESSURE: 69 MMHG | OXYGEN SATURATION: 100 % | TEMPERATURE: 98 F

## 2021-12-20 DIAGNOSIS — S30.1XXA ABDOMINAL WALL HEMATOMA, INITIAL ENCOUNTER: Primary | ICD-10-CM

## 2021-12-20 PROCEDURE — 99282 EMERGENCY DEPT VISIT SF MDM: CPT

## 2022-03-15 ENCOUNTER — HOSPITAL ENCOUNTER (EMERGENCY)
Facility: HOSPITAL | Age: 32
Discharge: HOME | End: 2022-03-15
Attending: EMERGENCY MEDICINE
Payer: MEDICAID

## 2022-03-15 VITALS
DIASTOLIC BLOOD PRESSURE: 62 MMHG | OXYGEN SATURATION: 99 % | SYSTOLIC BLOOD PRESSURE: 125 MMHG | RESPIRATION RATE: 19 BRPM | WEIGHT: 155 LBS | HEART RATE: 69 BPM | TEMPERATURE: 98 F | BODY MASS INDEX: 21.62 KG/M2

## 2022-03-15 DIAGNOSIS — R11.2 NAUSEA AND VOMITING: ICD-10-CM

## 2022-03-15 DIAGNOSIS — E86.0 DEHYDRATION: Primary | ICD-10-CM

## 2022-03-15 LAB
ALBUMIN SERPL BCP-MCNC: 5.3 G/DL (ref 3.5–5.2)
ALP SERPL-CCNC: 77 U/L (ref 55–135)
ALT SERPL W/O P-5'-P-CCNC: 20 U/L (ref 10–44)
ANION GAP SERPL CALC-SCNC: 11 MMOL/L (ref 8–16)
AST SERPL-CCNC: 23 U/L (ref 10–40)
BACTERIA #/AREA URNS HPF: ABNORMAL /HPF
BASOPHILS # BLD AUTO: 0.03 K/UL (ref 0–0.2)
BASOPHILS NFR BLD: 0.3 % (ref 0–1.9)
BILIRUB SERPL-MCNC: 0.6 MG/DL (ref 0.1–1)
BILIRUB UR QL STRIP: NEGATIVE
BUN SERPL-MCNC: 14 MG/DL (ref 6–20)
CALCIUM SERPL-MCNC: 10.9 MG/DL (ref 8.7–10.5)
CHLORIDE SERPL-SCNC: 98 MMOL/L (ref 95–110)
CK SERPL-CCNC: 84 U/L (ref 20–200)
CLARITY UR: CLEAR
CO2 SERPL-SCNC: 33 MMOL/L (ref 23–29)
COLOR UR: YELLOW
CREAT SERPL-MCNC: 1.1 MG/DL (ref 0.5–1.4)
DIFFERENTIAL METHOD: ABNORMAL
EOSINOPHIL # BLD AUTO: 0 K/UL (ref 0–0.5)
EOSINOPHIL NFR BLD: 0 % (ref 0–8)
ERYTHROCYTE [DISTWIDTH] IN BLOOD BY AUTOMATED COUNT: 11.8 % (ref 11.5–14.5)
EST. GFR  (AFRICAN AMERICAN): >60 ML/MIN/1.73 M^2
EST. GFR  (NON AFRICAN AMERICAN): >60 ML/MIN/1.73 M^2
GLUCOSE SERPL-MCNC: 128 MG/DL (ref 70–110)
GLUCOSE UR QL STRIP: NEGATIVE
HCT VFR BLD AUTO: 46.9 % (ref 40–54)
HGB BLD-MCNC: 16.1 G/DL (ref 14–18)
HGB UR QL STRIP: NEGATIVE
HYALINE CASTS #/AREA URNS LPF: 15 /LPF
IMM GRANULOCYTES # BLD AUTO: 0.04 K/UL (ref 0–0.04)
IMM GRANULOCYTES NFR BLD AUTO: 0.4 % (ref 0–0.5)
KETONES UR QL STRIP: ABNORMAL
LEUKOCYTE ESTERASE UR QL STRIP: NEGATIVE
LIPASE SERPL-CCNC: 27 U/L (ref 4–60)
LYMPHOCYTES # BLD AUTO: 1 K/UL (ref 1–4.8)
LYMPHOCYTES NFR BLD: 10.1 % (ref 18–48)
MCH RBC QN AUTO: 30.3 PG (ref 27–31)
MCHC RBC AUTO-ENTMCNC: 34.3 G/DL (ref 32–36)
MCV RBC AUTO: 88 FL (ref 82–98)
MICROSCOPIC COMMENT: ABNORMAL
MONOCYTES # BLD AUTO: 0.4 K/UL (ref 0.3–1)
MONOCYTES NFR BLD: 4.1 % (ref 4–15)
NEUTROPHILS # BLD AUTO: 8.1 K/UL (ref 1.8–7.7)
NEUTROPHILS NFR BLD: 85.1 % (ref 38–73)
NITRITE UR QL STRIP: NEGATIVE
NRBC BLD-RTO: 0 /100 WBC
PH UR STRIP: 6 [PH] (ref 5–8)
PLATELET # BLD AUTO: 368 K/UL (ref 150–450)
PMV BLD AUTO: 10.5 FL (ref 9.2–12.9)
POTASSIUM SERPL-SCNC: 4.5 MMOL/L (ref 3.5–5.1)
PROT SERPL-MCNC: 8.7 G/DL (ref 6–8.4)
PROT UR QL STRIP: ABNORMAL
RBC # BLD AUTO: 5.31 M/UL (ref 4.6–6.2)
RBC #/AREA URNS HPF: 1 /HPF (ref 0–4)
SODIUM SERPL-SCNC: 142 MMOL/L (ref 136–145)
SP GR UR STRIP: >1.03 (ref 1–1.03)
SQUAMOUS #/AREA URNS HPF: 0 /HPF
URN SPEC COLLECT METH UR: ABNORMAL
UROBILINOGEN UR STRIP-ACNC: NEGATIVE EU/DL
WBC # BLD AUTO: 9.51 K/UL (ref 3.9–12.7)
WBC #/AREA URNS HPF: 4 /HPF (ref 0–5)

## 2022-03-15 PROCEDURE — 96361 HYDRATE IV INFUSION ADD-ON: CPT

## 2022-03-15 PROCEDURE — 25000003 PHARM REV CODE 250: Performed by: PHYSICIAN ASSISTANT

## 2022-03-15 PROCEDURE — 93010 EKG 12-LEAD: ICD-10-PCS | Mod: ,,, | Performed by: INTERNAL MEDICINE

## 2022-03-15 PROCEDURE — 93010 ELECTROCARDIOGRAM REPORT: CPT | Mod: ,,, | Performed by: INTERNAL MEDICINE

## 2022-03-15 PROCEDURE — 85025 COMPLETE CBC W/AUTO DIFF WBC: CPT | Performed by: NURSE PRACTITIONER

## 2022-03-15 PROCEDURE — 96360 HYDRATION IV INFUSION INIT: CPT

## 2022-03-15 PROCEDURE — 83690 ASSAY OF LIPASE: CPT | Performed by: NURSE PRACTITIONER

## 2022-03-15 PROCEDURE — 99284 EMERGENCY DEPT VISIT MOD MDM: CPT | Mod: 25

## 2022-03-15 PROCEDURE — 81000 URINALYSIS NONAUTO W/SCOPE: CPT | Performed by: NURSE PRACTITIONER

## 2022-03-15 PROCEDURE — 93005 ELECTROCARDIOGRAM TRACING: CPT

## 2022-03-15 PROCEDURE — 25000003 PHARM REV CODE 250: Performed by: NURSE PRACTITIONER

## 2022-03-15 PROCEDURE — 82550 ASSAY OF CK (CPK): CPT | Performed by: NURSE PRACTITIONER

## 2022-03-15 PROCEDURE — 80053 COMPREHEN METABOLIC PANEL: CPT | Performed by: NURSE PRACTITIONER

## 2022-03-15 RX ORDER — ONDANSETRON 4 MG/1
8 TABLET, ORALLY DISINTEGRATING ORAL EVERY 6 HOURS PRN
Qty: 20 TABLET | Refills: 0 | Status: SHIPPED | OUTPATIENT
Start: 2022-03-15

## 2022-03-15 RX ORDER — ONDANSETRON 4 MG/1
4 TABLET, ORALLY DISINTEGRATING ORAL
Status: COMPLETED | OUTPATIENT
Start: 2022-03-15 | End: 2022-03-15

## 2022-03-15 RX ADMIN — SODIUM CHLORIDE 1000 ML: 0.9 INJECTION, SOLUTION INTRAVENOUS at 05:03

## 2022-03-15 RX ADMIN — SODIUM CHLORIDE 1000 ML: 0.9 INJECTION, SOLUTION INTRAVENOUS at 04:03

## 2022-03-15 RX ADMIN — ONDANSETRON 4 MG: 4 TABLET, ORALLY DISINTEGRATING ORAL at 04:03

## 2022-03-15 NOTE — FIRST PROVIDER EVALUATION
Emergency Department TeleTriage Encounter Note      CHIEF COMPLAINT    Chief Complaint   Patient presents with    Abdominal Pain     Pt states 2 days ago he drank a [employer] detox, then yesterday having n/v/d, states unable to hold fluids down, + body aches + HA + generalized abd pain        VITAL SIGNS   Initial Vitals [03/15/22 1549]   BP Pulse Resp Temp SpO2   126/85 90 17 98.2 °F (36.8 °C) 100 %      MAP       --            ALLERGIES    Review of patient's allergies indicates:  No Known Allergies    PROVIDER TRIAGE NOTE  This is a teletriage evaluation of a 31 y.o. male presenting to the ED with c/o abdominal pain after drinking a detox drink.  Pt states since yesterday he has been having vomiting and diarrhea for the past 2 days.  Pt states decreased urination.  Increased body aches.  Pt drank a [employer]ife detox on Sunday.     PE: VSS.  Speaking in full sentences.  Appears sick    Plan: labs, urine, hydrate, medicate    All ED beds are full at present; patient notified of this status.  Patient seen and medically screened by Nurse Practitioner via teletriage. Orders initiated at triage to expedite care.  Patient is stable and will be placed in an ED bed when available.  Care will be transferred to an alternate provider when patient has been placed in an Exam Room further exam, additional orders, and disposition.          ORDERS  Labs Reviewed   CBC W/ AUTO DIFFERENTIAL   COMPREHENSIVE METABOLIC PANEL   LIPASE   URINALYSIS, REFLEX TO URINE CULTURE       ED Orders (720h ago, onward)    Start Ordered     Status Ordering Provider    03/15/22 1600 03/15/22 1555  sodium chloride 0.9% bolus 1,000 mL  Once         Ordered CARLYN PARRA    03/15/22 1600 03/15/22 1555  ondansetron disintegrating tablet 4 mg  ED 1 Time         Ordered CARLYN PARRA    03/15/22 1556 03/15/22 1555  Vital signs  Every 2 hours         Ordered CARLYN PARRA    03/15/22 1556 03/15/22 1555  Diet NPO  Diet effective now         Ordered FIDEL  CARLYN L.    03/15/22 1556 03/15/22 1555  Insert peripheral IV  Once         Ordered ZEEVI, CARLYN ANALIA.    03/15/22 1556 03/15/22 1555  CBC W/ AUTO DIFFERENTIAL  STAT         Ordered ZEEVI, CARLYN ANALIA.    03/15/22 1556 03/15/22 1555  Comp. Metabolic Panel  STAT         Ordered ZEEVI CARLYN ANALIA.    03/15/22 1556 03/15/22 1555  Lipase  STAT         Ordered ZEEVI, CARLYN ANALIA.    03/15/22 1556 03/15/22 1555  Urinalysis, Reflex to Urine Culture Urine, Clean Catch  STAT         Ordered CARLYN PARRA            Virtual Visit Note: The provider triage portion of this emergency department evaluation and documentation was performed via [employer], a HIPAA-compliant telemedicine application, in concert with a tele-presenter in the room. A face to face patient evaluation with one of my colleagues will occur once the patient is placed in an emergency department room.      DISCLAIMER: This note was prepared with [employer] voice recognition transcription software. Garbled syntax, mangled pronouns, and other bizarre constructions may be attributed to that software system.   abdominal pain

## 2022-03-15 NOTE — ED NOTES
Assumed care of patient - patient states he drank an OTC detoxifier from [facility] 2 days ago to help with his opiod addiction and he has been feeling body aches and fatigue since - last opiod use was 2 days ago

## 2022-03-16 NOTE — DISCHARGE INSTRUCTIONS

## 2022-03-16 NOTE — ED PROVIDER NOTES
Encounter Date: 3/15/2022       History     Chief Complaint   Patient presents with    Abdominal Pain     Pt states 2 days ago he drank a [facility] detox, then yesterday having n/v/d, states unable to hold fluids down, + body aches + HA + generalized abd pain      31-year-old male presents to ED with concern of nausea, multiple episodes of vomiting and diarrhea, difficulty tolerating p.o. and generalized body aches that began early yesterday morning, shortly after drinking a detox supplement from [facility].  Patient states he admits he may have drank a little more than what was recommended for his body weight.  Denies abdominal pain, chest pain, cough, shortness of breath, visual changes, numbness, focal weakness, dysuria, changes in urine color.  No blood in vomit or stool.  He does report he feels like he is not urinating as much today.  He also reports feeling generalized weakness with when he described as near syncopal episode this afternoon.  No loss of conscious.  Denies lightheadedness, dizziness, headache.  No other acute complaints at this time.    The history is provided by the patient.     Review of patient's allergies indicates:  No Known Allergies  Past Medical History:   Diagnosis Date    Asthma     Closed right radial fracture     Depression     H/O chest tube placement     Rib fracture     Ulcerative colitis      Past Surgical History:   Procedure Laterality Date    COLONOSCOPY N/A 3/8/2017    Procedure: COLONOSCOPY;  Surgeon: Max Cullen Jr., MD;  Location: [facility];  Service: Endoscopy;  Laterality: N/A;    COLONOSCOPY N/A 2/20/2019    Procedure: COLONOSCOPY;  Surgeon: Ramin Davis MD;  Location: [address]);  Service: Endoscopy;  Laterality: N/A;  order seperated per Dr. Frausto    MOUTH SURGERY      OPEN REDUCTION AND INTERNAL FIXATION (ORIF) OF FRACTURE OF DISTAL RADIUS Right 9/4/2020    Procedure: ORIF, FRACTURE, RADIUS, DISTAL;  Surgeon: Misbah Leigh Jr., MD;  Location:  [facility] OR;  Service: Orthopedics;  Laterality: Right;  ACUMED; mini c-arm  Confirmed 9/3 - [facility]    TONSILLECTOMY       Family History   Problem Relation Age of Onset    Celiac disease Neg Hx     Cirrhosis Neg Hx     Colon cancer Neg Hx     Colon polyps Neg Hx     Crohn's disease Neg Hx     Esophageal cancer Neg Hx     Inflammatory bowel disease Neg Hx     Liver cancer Neg Hx     Liver disease Neg Hx     Rectal cancer Neg Hx     Stomach cancer Neg Hx     Ulcerative colitis Neg Hx      Social History     Tobacco Use    Smoking status: Former Smoker    Smokeless tobacco: Never Used    Tobacco comment: quit 7 yrs ago   Substance Use Topics    Alcohol use: Yes     Comment: rarely    Drug use: No     Review of Systems   Constitutional: Negative for chills and fever.   Eyes: Negative for visual disturbance.   Respiratory: Negative for cough and shortness of breath.    Cardiovascular: Negative for chest pain.   Gastrointestinal: Positive for abdominal pain, diarrhea, nausea and vomiting. Negative for blood in stool.   Musculoskeletal: Negative for back pain, neck pain and neck stiffness.   Neurological: Negative for headaches.       Physical Exam     Initial Vitals [03/15/22 1549]   BP Pulse Resp Temp SpO2   126/85 90 17 98.2 °F (36.8 °C) 100 %      MAP       --         Physical Exam    Nursing note and vitals reviewed.  Constitutional: He appears well-developed and well-nourished. He is cooperative. He does not have a sickly appearance. He does not appear ill. No distress.   Resting comfortably in chair, no apparent distress   HENT:   Head: Normocephalic and atraumatic.   Mildly dry mucous membranes.   Eyes: EOM are normal.   Neck: Neck supple.   Normal range of motion.  Cardiovascular: Normal rate, regular rhythm and normal heart sounds.   Pulmonary/Chest: Effort normal and breath sounds normal.   Abdominal: Abdomen is soft. There is no abdominal tenderness.   Musculoskeletal:         General: No tenderness.       Cervical back: Normal range of motion and neck supple.     Neurological: He is alert and oriented to person, place, and time. GCS score is 15. GCS eye subscore is 4. GCS verbal subscore is 5. GCS motor subscore is 6.   Skin: Skin is warm and dry.   Psychiatric: He has a normal mood and affect. His speech is normal and behavior is normal. Thought content normal.         ED Course   Procedures  Labs Reviewed   CBC W/ AUTO DIFFERENTIAL - Abnormal; Notable for the following components:       Result Value    Gran # (ANC) 8.1 (*)     Gran % 85.1 (*)     Lymph % 10.1 (*)     All other components within normal limits   COMPREHENSIVE METABOLIC PANEL - Abnormal; Notable for the following components:    CO2 33 (*)     Glucose 128 (*)     Calcium 10.9 (*)     Total Protein 8.7 (*)     Albumin 5.3 (*)     All other components within normal limits   URINALYSIS, REFLEX TO URINE CULTURE - Abnormal; Notable for the following components:    Specific Gravity, UA >1.030 (*)     Protein, UA 2+ (*)     Ketones, UA 1+ (*)     All other components within normal limits    Narrative:     Specimen Source->Urine   URINALYSIS MICROSCOPIC - Abnormal; Notable for the following components:    Hyaline Casts, UA 15 (*)     All other components within normal limits    Narrative:     Specimen Source->Urine   LIPASE   CK   CK    Narrative:     CPK add-on requested by Ricardo Saha PA-C          Imaging Results    None          Medications   sodium chloride 0.9% bolus 1,000 mL (0 mLs Intravenous Stopped 3/15/22 1729)   ondansetron disintegrating tablet 4 mg (4 mg Oral Given 3/15/22 1639)   sodium chloride 0.9% bolus 1,000 mL (0 mLs Intravenous Stopped 3/15/22 1841)     Medical Decision Making:   Initial Assessment:   Patient presents with concern of nausea, vomiting, diarrhea, difficulty tolerating p.o. and generalized body aches that began yesterday, shortly after taking supplement for detox.  Denies abdominal pain.  Afebrile on arrival with  vitals WNL.  On exam, patient resting comfortably in chair, no apparent distress.  No abdominal tenderness.  Differential Diagnosis:   Viral, gastroenteritis, food poisoning, electrolyte abnormality, dehydration, BREANNA, rhabdomyolysis  ED Management:  CBC and CMP grossly unremarkable.  Lipase WNL.  CK WNL.  UA noting 2+ protein and 1+ ketones but no other significant findings for acute infectious process.  EKG no acute ST changes or T-wave abnormalities.    Patient's symptoms were treated in ED with Zofran and total of 2 L IV fluids.  Following rehydration, patient stating his symptoms have significantly improved.  No further episodes of vomiting or diarrhea in ED.  Able tolerate p.o..  Stable for discharge home at this time.  Prescription for Zofran.  Encouraged oral hydration, rest, monitor symptoms closely, PCP follow-up.  ED return precautions discussed.  Patient states his understanding and agrees with plan.    Patient discussed with attending, Dr. Best, who agrees with ED course and dispo.                      Clinical Impression:   Final diagnoses:  [R11.2] Nausea and vomiting  [E86.0] Dehydration (Primary)          ED Disposition Condition    Discharge Stable        ED Prescriptions     Medication Sig Dispense Start Date End Date Auth. Provider    ondansetron (ZOFRAN-ODT) 4 MG TbDL Take 2 tablets (8 mg total) by mouth every 6 (six) hours as needed (nausea). 20 tablet 3/15/2022  Ricardo Saha PA-C        Follow-up Information     Follow up With Specialties Details Why Contact Info    Your Doctor  Schedule an appointment as soon as possible for a visit              Ricardo Saha PA-C  03/15/22 1936

## 2022-03-21 ENCOUNTER — HOSPITAL ENCOUNTER (EMERGENCY)
Facility: HOSPITAL | Age: 32
Discharge: HOME | End: 2022-03-21
Attending: EMERGENCY MEDICINE
Payer: MEDICAID

## 2022-03-21 VITALS
TEMPERATURE: 99 F | RESPIRATION RATE: 18 BRPM | SYSTOLIC BLOOD PRESSURE: 123 MMHG | HEART RATE: 80 BPM | OXYGEN SATURATION: 100 % | DIASTOLIC BLOOD PRESSURE: 80 MMHG | BODY MASS INDEX: 25.1 KG/M2 | WEIGHT: 180 LBS

## 2022-03-21 DIAGNOSIS — S61.213A LACERATION OF LEFT MIDDLE FINGER WITHOUT FOREIGN BODY WITHOUT DAMAGE TO NAIL, INITIAL ENCOUNTER: Primary | ICD-10-CM

## 2022-03-21 PROCEDURE — 90715 TDAP VACCINE 7 YRS/> IM: CPT | Performed by: NURSE PRACTITIONER

## 2022-03-21 PROCEDURE — 63600175 PHARM REV CODE 636 W HCPCS: Performed by: NURSE PRACTITIONER

## 2022-03-21 PROCEDURE — 90471 IMMUNIZATION ADMIN: CPT | Performed by: NURSE PRACTITIONER

## 2022-03-21 PROCEDURE — 12001 RPR S/N/AX/GEN/TRNK 2.5CM/<: CPT | Mod: F2

## 2022-03-21 PROCEDURE — 99284 EMERGENCY DEPT VISIT MOD MDM: CPT | Mod: 25

## 2022-03-21 PROCEDURE — 25000003 PHARM REV CODE 250: Performed by: EMERGENCY MEDICINE

## 2022-03-21 RX ORDER — KETOROLAC TROMETHAMINE 10 MG/1
10 TABLET, FILM COATED ORAL
Status: COMPLETED | OUTPATIENT
Start: 2022-03-21 | End: 2022-03-21

## 2022-03-21 RX ORDER — OXYCODONE AND ACETAMINOPHEN 5; 325 MG/1; MG/1
1 TABLET ORAL
Status: COMPLETED | OUTPATIENT
Start: 2022-03-21 | End: 2022-03-21

## 2022-03-21 RX ORDER — ACETAMINOPHEN 500 MG
500 TABLET ORAL
Status: COMPLETED | OUTPATIENT
Start: 2022-03-21 | End: 2022-03-21

## 2022-03-21 RX ADMIN — KETOROLAC TROMETHAMINE 10 MG: 10 TABLET, FILM COATED ORAL at 03:03

## 2022-03-21 RX ADMIN — OXYCODONE HYDROCHLORIDE AND ACETAMINOPHEN 1 TABLET: 5; 325 TABLET ORAL at 03:03

## 2022-03-21 RX ADMIN — ACETAMINOPHEN 500 MG: 500 TABLET ORAL at 03:03

## 2022-03-21 RX ADMIN — TETANUS TOXOID, REDUCED DIPHTHERIA TOXOID AND ACELLULAR PERTUSSIS VACCINE, ADSORBED 0.5 ML: 5; 2.5; 8; 8; 2.5 SUSPENSION INTRAMUSCULAR at 02:03

## 2022-03-21 NOTE — ED PROVIDER NOTES
Encounter Date: 3/21/2022       History     Chief Complaint   Patient presents with    Laceration     Pt presents to the ER with CC of a finger laceration. Pt reports he was cleaning out his care when he cought his finger on a fresh reazor. Pt presents with a laceration to the (L) middle finger. Bleeding controlled.     Adi Coleman III is a 31 y.o. male who  has a past medical history of Asthma, Closed right radial fracture, Depression, H/O chest tube placement, Rib fracture, and Ulcerative colitis.    The patient presents to the ED due to left long finger laceration which occurred approximately 30 minutes prior to arrival.  Patient cut his finger on a razor.  No therapies have been tried prior to arrival.  Bleeding was controlled with pressure.  He denies any numbness or weakness reports a shooting pain his arm.  His last tetanus is unknown.  No other complaints or concerns noted today.        Review of patient's allergies indicates:  No Known Allergies  Past Medical History:   Diagnosis Date    Asthma     Closed right radial fracture     Depression     H/O chest tube placement     Rib fracture     Ulcerative colitis      Past Surgical History:   Procedure Laterality Date    COLONOSCOPY N/A 3/8/2017    Procedure: COLONOSCOPY;  Surgeon: Max Cullen Jr., MD;  Location: [facility] ENDO;  Service: Endoscopy;  Laterality: N/A;    COLONOSCOPY N/A 2/20/2019    Procedure: COLONOSCOPY;  Surgeon: Ramin Davis MD;  Location: [facility] ([address] [place] [address]);  Service: Endoscopy;  Laterality: N/A;  order seperated per Dr. Frausto    MOUTH SURGERY      OPEN REDUCTION AND INTERNAL FIXATION (ORIF) OF FRACTURE OF DISTAL RADIUS Right 9/4/2020    Procedure: ORIF, FRACTURE, RADIUS, DISTAL;  Surgeon: Misbah Leigh Jr., MD;  Location: [facility] OR;  Service: Orthopedics;  Laterality: Right;  ACUMED; mini c-arm  Confirmed 9/3 - [facility]    TONSILLECTOMY       Family History   Problem Relation Age of Onset    Celiac disease Neg Hx      Cirrhosis Neg Hx     Colon cancer Neg Hx     Colon polyps Neg Hx     Crohn's disease Neg Hx     Esophageal cancer Neg Hx     Inflammatory bowel disease Neg Hx     Liver cancer Neg Hx     Liver disease Neg Hx     Rectal cancer Neg Hx     Stomach cancer Neg Hx     Ulcerative colitis Neg Hx      Social History     Tobacco Use    Smoking status: Former Smoker    Smokeless tobacco: Never Used    Tobacco comment: quit 7 yrs ago   Substance Use Topics    Alcohol use: Yes     Comment: rarely    Drug use: No     Review of Systems   Musculoskeletal: Negative for arthralgias.   Skin: Positive for wound.   Allergic/Immunologic: Negative for immunocompromised state.   Neurological: Negative for weakness and numbness.   Hematological: Does not bruise/bleed easily.       Physical Exam     Initial Vitals [03/21/22 1440]   BP Pulse Resp Temp SpO2   130/67 88 14 98.1 °F (36.7 °C) 99 %      MAP       --         Physical Exam    Nursing note and vitals reviewed.  Constitutional: He appears well-developed and well-nourished. He is not diaphoretic. No distress.   HENT:   Head: Normocephalic and atraumatic.   Eyes: Conjunctivae are normal.   Cardiovascular: Regular rhythm and intact distal pulses.   Musculoskeletal:      Comments: Left upper extremity:  Sensation intact to light touch.  0.5 cm laceration to the finger tip of the long finger on the volar side, well approximated bleeding controlled.  No apparent deficits of flexion or extension of the DIP     Neurological: He is alert.   Skin: Skin is warm and dry. Capillary refill takes less than 2 seconds. No rash noted.   Psychiatric: He has a normal mood and affect.         ED Course   Lac Repair    Date/Time: 3/21/2022 10:45 PM  Performed by: Misbah Owens Jr., MD  Authorized by: Misbah Owens Jr., MD     Consent:     Consent obtained:  Verbal  Laceration details:     Location:  Finger    Finger location:  L long finger    Length (cm):  0.5  Pre-procedure details:      Preparation:  Patient was prepped and draped in usual sterile fashion  Exploration:     Hemostasis achieved with:  Direct pressure    Wound extent: no fascia violation noted, no foreign bodies/material noted, no muscle damage noted, no nerve damage noted, no tendon damage noted, no underlying fracture noted and no vascular damage noted    Treatment:     Area cleansed with:  Povidone-iodine    Amount of cleaning:  Standard    Irrigation solution:  Sterile saline    Irrigation method:  Pressure wash  Skin repair:     Repair method:  Tissue adhesive  Approximation:     Approximation:  Close  Repair type:     Repair type:  Simple  Post-procedure details:     Dressing:  Non-adherent dressing and adhesive bandage      Labs Reviewed - No data to display       Imaging Results    None          Medications   Tdap (BOOSTRIX) vaccine injection 0.5 mL (0.5 mLs Intramuscular Given 3/21/22 4766)   ketorolac tablet 10 mg (10 mg Oral Given 3/21/22 1512)   acetaminophen tablet 500 mg (500 mg Oral Given 3/21/22 1512)   oxyCODONE-acetaminophen 5-325 mg per tablet 1 tablet (1 tablet Oral Given 3/21/22 1541)     Medical Decision Making:   Initial Assessment:   31-year-old right hand dominant male presenting with laceration to distal finger tip.  Bleeding controlled.  Wound is amenable to repair with Dermabond.  Differential Diagnosis:   Differential Diagnosis includes, but is not limited to:  Open fracture, vascular injury, tendon/ligament injury, nerve injury, retained foreign body, superficial laceration, abrasion.    ED Management:  Wound was repaired with Dermabond patient tolerated well.  Discussed return precautions for increased pain swelling redness fever drainage or any other concerns.  Patient verbalized understanding and is uncomfortable this plan.    After taking into careful account the historical factors and physical exam findings of the patient's presentation today, in conjunction with the empirical and objective data  obtained on ED workup, no acute emergent medical condition has been identified. The patient appears to be low risk for an emergent medical condition and I feel it is safe and appropriate at this time for the patient to be discharged to follow-up as detailed in their discharge instructions for reevaluation and possible continued outpatient workup and management. I have discussed the specifics of the workup with the patient and the patient has verbalized understanding of the details of the workup, the diagnosis, the treatment plan, and the need for outpatient follow-up.  Although the patient has no emergent etiology today this does not preclude the development of an emergent condition so in addition, I have advised the patient that they can return to the ED and/or activate EMS at any time with worsening of their symptoms, change of their symptoms, or with any other medical complaint.  The patient remained comfortable and stable during their visit in the ED.  Discharge and follow-up instructions discussed with the patient who expressed understanding and willingness to comply with my recommendations.                        Clinical Impression:   Final diagnoses:  [S61.213A] Laceration of left middle finger without foreign body without damage to nail, initial encounter (Primary)          ED Disposition Condition    Discharge Stable        ED Prescriptions     None        Follow-up Information     Follow up With Specialties Details Why Contact Info    [facility] Emergency Dept Emergency Medicine  If symptoms worsen, As needed [address] [address] [phone]          Portions of this note were dictated using voice recognition software and may contain dictation related errors in spelling/grammar/syntax not found on text review       Misbah Owens Jr., MD  03/21/22 0684

## 2022-03-21 NOTE — FIRST PROVIDER EVALUATION
Emergency Department TeleTriage Encounter Note      CHIEF COMPLAINT    Chief Complaint   Patient presents with    Laceration     Pt presents to the ER with CC of a finger laceration. Pt reports he was cleaning out his care when he cought his finger on a fresh reazor. Pt presents with a laceration to the (L) middle finger. Bleeding controlled.       VITAL SIGNS   Initial Vitals [03/21/22 1440]   BP Pulse Resp Temp SpO2   130/67 88 14 98.1 °F (36.7 °C) 99 %      MAP       --            ALLERGIES    Review of patient's allergies indicates:  No Known Allergies    PROVIDER TRIAGE NOTE  This is a teletriage evaluation of a 31 y.o. male presenting to the ED complaining of finger laceration sustained today on a razor.  Bleeding controlled. Td not UTD.     Initial orders will be placed and care will be transferred to an alternate provider when patient is roomed for a full evaluation. Any additional orders and the final disposition will be determined by that provider.           ORDERS  Labs Reviewed - No data to display    ED Orders (720h ago, onward)    Start Ordered     Status Ordering Provider    03/21/22 1547 03/21/22 1447  Tdap (BOOSTRIX) vaccine injection 0.5 mL  vaccine x 1 dose         Ordered CLARI JASMINE    03/21/22 1448 03/21/22 1447  Suture Tray to bedside  Once         Ordered CLARI JASMINE            Virtual Visit Note: The provider triage portion of this emergency department evaluation and documentation was performed via [employer], a HIPAA-compliant telemedicine application, in concert with a tele-presenter in the room. A face to face patient evaluation with one of my colleagues will occur once the patient is placed in an emergency department room.      DISCLAIMER: This note was prepared with [employer] voice recognition transcription software. Garbled syntax, mangled pronouns, and other bizarre constructions may be attributed to that software system.

## 2022-05-22 ENCOUNTER — HOSPITAL ENCOUNTER (EMERGENCY)
Facility: HOSPITAL | Age: 32
Discharge: HOME | End: 2022-05-22
Attending: EMERGENCY MEDICINE
Payer: MEDICAID

## 2022-05-22 VITALS
HEART RATE: 70 BPM | OXYGEN SATURATION: 98 % | RESPIRATION RATE: 18 BRPM | DIASTOLIC BLOOD PRESSURE: 75 MMHG | WEIGHT: 175 LBS | TEMPERATURE: 98 F | SYSTOLIC BLOOD PRESSURE: 125 MMHG | BODY MASS INDEX: 24.41 KG/M2

## 2022-05-22 DIAGNOSIS — F11.93 OPIATE WITHDRAWAL: ICD-10-CM

## 2022-05-22 DIAGNOSIS — R11.2 NAUSEA VOMITING AND DIARRHEA: ICD-10-CM

## 2022-05-22 DIAGNOSIS — R10.9 ABDOMINAL PAIN: ICD-10-CM

## 2022-05-22 DIAGNOSIS — R10.84 GENERALIZED ABDOMINAL PAIN: Primary | ICD-10-CM

## 2022-05-22 DIAGNOSIS — R19.7 NAUSEA VOMITING AND DIARRHEA: ICD-10-CM

## 2022-05-22 LAB
ALBUMIN SERPL BCP-MCNC: 4.7 G/DL (ref 3.5–5.2)
ALP SERPL-CCNC: 72 U/L (ref 55–135)
ALT SERPL W/O P-5'-P-CCNC: 13 U/L (ref 10–44)
ANION GAP SERPL CALC-SCNC: 15 MMOL/L (ref 8–16)
AST SERPL-CCNC: 18 U/L (ref 10–40)
BASOPHILS # BLD AUTO: 0.01 K/UL (ref 0–0.2)
BASOPHILS NFR BLD: 0.2 % (ref 0–1.9)
BILIRUB SERPL-MCNC: 0.6 MG/DL (ref 0.1–1)
BUN SERPL-MCNC: 14 MG/DL (ref 6–20)
CALCIUM SERPL-MCNC: 10.6 MG/DL (ref 8.7–10.5)
CHLORIDE SERPL-SCNC: 100 MMOL/L (ref 95–110)
CK SERPL-CCNC: 66 U/L (ref 20–200)
CO2 SERPL-SCNC: 27 MMOL/L (ref 23–29)
CREAT SERPL-MCNC: 1.1 MG/DL (ref 0.5–1.4)
DIFFERENTIAL METHOD: ABNORMAL
EOSINOPHIL # BLD AUTO: 0 K/UL (ref 0–0.5)
EOSINOPHIL NFR BLD: 0 % (ref 0–8)
ERYTHROCYTE [DISTWIDTH] IN BLOOD BY AUTOMATED COUNT: 10.9 % (ref 11.5–14.5)
EST. GFR  (AFRICAN AMERICAN): >60 ML/MIN/1.73 M^2
EST. GFR  (NON AFRICAN AMERICAN): >60 ML/MIN/1.73 M^2
GLUCOSE SERPL-MCNC: 120 MG/DL (ref 70–110)
HCT VFR BLD AUTO: 43.9 % (ref 40–54)
HGB BLD-MCNC: 15.3 G/DL (ref 14–18)
IMM GRANULOCYTES # BLD AUTO: 0.02 K/UL (ref 0–0.04)
IMM GRANULOCYTES NFR BLD AUTO: 0.3 % (ref 0–0.5)
LYMPHOCYTES # BLD AUTO: 1 K/UL (ref 1–4.8)
LYMPHOCYTES NFR BLD: 16 % (ref 18–48)
MAGNESIUM SERPL-MCNC: 1.8 MG/DL (ref 1.6–2.6)
MCH RBC QN AUTO: 30.1 PG (ref 27–31)
MCHC RBC AUTO-ENTMCNC: 34.9 G/DL (ref 32–36)
MCV RBC AUTO: 86 FL (ref 82–98)
MONOCYTES # BLD AUTO: 0.2 K/UL (ref 0.3–1)
MONOCYTES NFR BLD: 4 % (ref 4–15)
NEUTROPHILS # BLD AUTO: 4.7 K/UL (ref 1.8–7.7)
NEUTROPHILS NFR BLD: 79.5 % (ref 38–73)
NRBC BLD-RTO: 0 /100 WBC
PLATELET # BLD AUTO: 332 K/UL (ref 150–450)
PLATELET BLD QL SMEAR: ABNORMAL
PMV BLD AUTO: 9.9 FL (ref 9.2–12.9)
POTASSIUM SERPL-SCNC: 4.5 MMOL/L (ref 3.5–5.1)
PROT SERPL-MCNC: 8.5 G/DL (ref 6–8.4)
RBC # BLD AUTO: 5.08 M/UL (ref 4.6–6.2)
SODIUM SERPL-SCNC: 142 MMOL/L (ref 136–145)
WBC # BLD AUTO: 5.94 K/UL (ref 3.9–12.7)

## 2022-05-22 PROCEDURE — 99284 EMERGENCY DEPT VISIT MOD MDM: CPT | Mod: 25

## 2022-05-22 PROCEDURE — 96374 THER/PROPH/DIAG INJ IV PUSH: CPT

## 2022-05-22 PROCEDURE — 63600175 PHARM REV CODE 636 W HCPCS: Performed by: EMERGENCY MEDICINE

## 2022-05-22 PROCEDURE — 96361 HYDRATE IV INFUSION ADD-ON: CPT

## 2022-05-22 PROCEDURE — 85025 COMPLETE CBC W/AUTO DIFF WBC: CPT | Performed by: EMERGENCY MEDICINE

## 2022-05-22 PROCEDURE — 96375 TX/PRO/DX INJ NEW DRUG ADDON: CPT

## 2022-05-22 PROCEDURE — 25000003 PHARM REV CODE 250: Performed by: EMERGENCY MEDICINE

## 2022-05-22 PROCEDURE — 82550 ASSAY OF CK (CPK): CPT | Performed by: EMERGENCY MEDICINE

## 2022-05-22 PROCEDURE — 80053 COMPREHEN METABOLIC PANEL: CPT | Performed by: EMERGENCY MEDICINE

## 2022-05-22 PROCEDURE — 83735 ASSAY OF MAGNESIUM: CPT | Performed by: EMERGENCY MEDICINE

## 2022-05-22 RX ORDER — MAG HYDROX/ALUMINUM HYD/SIMETH 200-200-20
30 SUSPENSION, ORAL (FINAL DOSE FORM) ORAL ONCE
Status: COMPLETED | OUTPATIENT
Start: 2022-05-22 | End: 2022-05-22

## 2022-05-22 RX ORDER — DICYCLOMINE HYDROCHLORIDE 20 MG/1
20 TABLET ORAL 3 TIMES DAILY PRN
Qty: 14 TABLET | Refills: 0 | Status: SHIPPED | OUTPATIENT
Start: 2022-05-22 | End: 2022-06-21

## 2022-05-22 RX ORDER — METOCLOPRAMIDE 10 MG/1
10 TABLET ORAL EVERY 6 HOURS PRN
Qty: 14 TABLET | Refills: 0 | Status: SHIPPED | OUTPATIENT
Start: 2022-05-22

## 2022-05-22 RX ORDER — KETOROLAC TROMETHAMINE 30 MG/ML
15 INJECTION, SOLUTION INTRAMUSCULAR; INTRAVENOUS
Status: COMPLETED | OUTPATIENT
Start: 2022-05-22 | End: 2022-05-22

## 2022-05-22 RX ORDER — CLONIDINE HYDROCHLORIDE 0.1 MG/1
0.1 TABLET ORAL EVERY 8 HOURS PRN
Qty: 14 TABLET | Refills: 0 | Status: SHIPPED | OUTPATIENT
Start: 2022-05-22 | End: 2023-05-22

## 2022-05-22 RX ORDER — CLONIDINE HYDROCHLORIDE 0.1 MG/1
0.1 TABLET ORAL
Status: COMPLETED | OUTPATIENT
Start: 2022-05-22 | End: 2022-05-22

## 2022-05-22 RX ORDER — PROCHLORPERAZINE EDISYLATE 5 MG/ML
10 INJECTION INTRAMUSCULAR; INTRAVENOUS
Status: COMPLETED | OUTPATIENT
Start: 2022-05-22 | End: 2022-05-22

## 2022-05-22 RX ORDER — LIDOCAINE HYDROCHLORIDE 20 MG/ML
10 SOLUTION OROPHARYNGEAL ONCE
Status: COMPLETED | OUTPATIENT
Start: 2022-05-22 | End: 2022-05-22

## 2022-05-22 RX ADMIN — SODIUM CHLORIDE 1000 ML: 0.9 INJECTION, SOLUTION INTRAVENOUS at 04:05

## 2022-05-22 RX ADMIN — ALUMINUM HYDROXIDE, MAGNESIUM HYDROXIDE, AND SIMETHICONE 30 ML: 200; 200; 20 SUSPENSION ORAL at 06:05

## 2022-05-22 RX ADMIN — KETOROLAC TROMETHAMINE 15 MG: 30 INJECTION, SOLUTION INTRAMUSCULAR at 06:05

## 2022-05-22 RX ADMIN — SODIUM CHLORIDE, SODIUM LACTATE, POTASSIUM CHLORIDE, AND CALCIUM CHLORIDE 1000 ML: .6; .31; .03; .02 INJECTION, SOLUTION INTRAVENOUS at 06:05

## 2022-05-22 RX ADMIN — CLONIDINE HYDROCHLORIDE 0.1 MG: 0.1 TABLET ORAL at 06:05

## 2022-05-22 RX ADMIN — PROCHLORPERAZINE EDISYLATE 10 MG: 5 INJECTION INTRAMUSCULAR; INTRAVENOUS at 04:05

## 2022-05-22 RX ADMIN — LIDOCAINE HYDROCHLORIDE 10 ML: 20 SOLUTION ORAL; TOPICAL at 06:05

## 2022-05-22 NOTE — DISCHARGE INSTRUCTIONS
Please make sure you are drinking plenty of fluids, such as gatorade or powerade. I recommend taking ibuprofen 600mg every 8 hours with food and/or tylenol 650mg every 8 hours as needed for pain in addition to the prescribed medication. Please follow up with a primary care physician for further evaluation and management. Please return with any new or worsening symptoms.

## 2022-05-22 NOTE — ED PROVIDER NOTES
Encounter Date: 5/22/2022       History     Chief Complaint   Patient presents with    Withdrawal     Pt reports withdrawals from drug kratom, last used 2 days ago, pt complain of abd pain with n/v/d, and decreased urine      31-year-old male presents emergency department complaining of nausea, vomiting, diarrhea, leg cramping, abdominal pain.  Onset a couple of days ago.  States he believes he is withdrawing from opiates, which she had been previously abusing and stopped a few days ago.  Notes nausea with several episodes of nonbloody, nonbilious emesis over the last few days, tolerating very a little by mouth.  Notes several nonbloody bowel movements today and yesterday, watery in nature.  Notes generalized, cramping abdominal pain that comes and goes, lasting minutes at a time, but happening multiple times per hour.  Denies any dysuria, frequency, urgency.  Notes some cramping pains intermittently to his legs that come and go without eliciting, exacerbating, or alleviating factors.  Also notes feeling generalized fatigue.  No other symptoms reported at this time.         Review of patient's allergies indicates:  No Known Allergies  Past Medical History:   Diagnosis Date    Asthma     Closed right radial fracture     Depression     H/O chest tube placement     Rib fracture     Ulcerative colitis      Past Surgical History:   Procedure Laterality Date    COLONOSCOPY N/A 3/8/2017    Procedure: COLONOSCOPY;  Surgeon: Max Cullen Jr., MD;  Location: [facility];  Service: Endoscopy;  Laterality: N/A;    COLONOSCOPY N/A 2/20/2019    Procedure: COLONOSCOPY;  Surgeon: Ramin Davis MD;  Location: [facility] ([address] [place] [address]);  Service: Endoscopy;  Laterality: N/A;  order seperated per Dr. Frausto    MOUTH SURGERY      OPEN REDUCTION AND INTERNAL FIXATION (ORIF) OF FRACTURE OF DISTAL RADIUS Right 9/4/2020    Procedure: ORIF, FRACTURE, RADIUS, DISTAL;  Surgeon: Misbah Leigh Jr., MD;  Location: [facility] OR;  Service:  Orthopedics;  Laterality: Right;  ACUMED; mini c-arm  Confirmed 9/3 - [facility]    TONSILLECTOMY       Family History   Problem Relation Age of Onset    Celiac disease Neg Hx     Cirrhosis Neg Hx     Colon cancer Neg Hx     Colon polyps Neg Hx     Crohn's disease Neg Hx     Esophageal cancer Neg Hx     Inflammatory bowel disease Neg Hx     Liver cancer Neg Hx     Liver disease Neg Hx     Rectal cancer Neg Hx     Stomach cancer Neg Hx     Ulcerative colitis Neg Hx      Social History     Tobacco Use    Smoking status: Former Smoker    Smokeless tobacco: Never Used    Tobacco comment: quit 7 yrs ago   Substance Use Topics    Alcohol use: Yes     Comment: rarely    Drug use: No     Review of Systems   Constitutional: Positive for fatigue. Negative for chills and fever.   HENT: Negative for congestion and sore throat.    Eyes: Negative for photophobia and visual disturbance.   Respiratory: Negative for cough and shortness of breath.    Cardiovascular: Negative for chest pain and palpitations.   Gastrointestinal: Positive for abdominal pain, diarrhea and vomiting.   Musculoskeletal: Negative for back pain, neck pain and neck stiffness.   Neurological: Negative for light-headedness, numbness and headaches.       Physical Exam     Initial Vitals [05/22/22 1631]   BP Pulse Resp Temp SpO2   127/69 79 18 98.8 °F (37.1 °C) 100 %      MAP       --         Physical Exam    Nursing note and vitals reviewed.  Constitutional: He appears well-developed and well-nourished. No distress.   HENT:   Head: Normocephalic and atraumatic.   Eyes: Conjunctivae and EOM are normal. Pupils are equal, round, and reactive to light.   Neck: Neck supple. No tracheal deviation present.   Normal range of motion.  Cardiovascular: Normal rate and intact distal pulses.   Pulmonary/Chest: No respiratory distress.   Abdominal: Abdomen is soft. He exhibits no distension. There is abdominal tenderness (Mild, diffuse). There is no rebound and no  guarding.   Musculoskeletal:         General: No tenderness or edema. Normal range of motion.      Cervical back: Normal range of motion and neck supple.     Neurological: He is alert and oriented to person, place, and time. He has normal strength. No cranial nerve deficit. GCS score is 15. GCS eye subscore is 4. GCS verbal subscore is 5. GCS motor subscore is 6.   Skin: Skin is warm and dry.         ED Course   Procedures  Labs Reviewed   CBC W/ AUTO DIFFERENTIAL - Abnormal; Notable for the following components:       Result Value    RDW 10.9 (*)     Mono # 0.2 (*)     Gran % 79.5 (*)     Lymph % 16.0 (*)     All other components within normal limits   COMPREHENSIVE METABOLIC PANEL - Abnormal; Notable for the following components:    Glucose 120 (*)     Calcium 10.6 (*)     Total Protein 8.5 (*)     All other components within normal limits   MAGNESIUM   CK            X-Rays:   Independently Interpreted Readings:   Other Readings:  Imaging interpreted by radiologist and visualized by me:     Imaging Results          X-Ray Abdomen Flat And Erect (Final result)  Result time 05/22/22 18:27:15    Final result by Elisabet Anna MD (05/22/22 18:27:15)                 Impression:      Nonspecific bowel gas pattern.  If persistent abdominal pain, recommend CT.      Electronically signed by: Elisabet Anna  Date:    05/22/2022  Time:    18:27             Narrative:    EXAMINATION:  ABDOMEN FLAT AND ERECT    CLINICAL HISTORY:  Unspecified abdominal pain    TECHNIQUE:  Abdomen flat and erect radiographs were submitted.    COMPARISON:  None.    FINDINGS:  Abdomen flat and erect radiographs demonstrate a nonspecific bowel gas pattern.  There are no dilated loops of small bowel detected.  There is relative paucity of bowel gas.  Single air-fluid level is seen in the left mid abdomen.  There is no free air detected under the diaphragm. }                                Medications   lactated ringers bolus 1,000 mL (1,000 mLs  Intravenous New Bag 5/22/22 1845)   sodium chloride 0.9% bolus 1,000 mL (0 mLs Intravenous Stopped 5/22/22 1744)   prochlorperazine injection Soln 10 mg (10 mg Intravenous Given 5/22/22 1644)   ketorolac injection 15 mg (15 mg Intravenous Given 5/22/22 1830)   aluminum-magnesium hydroxide-simethicone 200-200-20 mg/5 mL suspension 30 mL (30 mLs Oral Given 5/22/22 1830)     And   LIDOcaine HCl 2% oral solution 10 mL (10 mLs Oral Given 5/22/22 1830)   cloNIDine tablet 0.1 mg (0.1 mg Oral Given 5/22/22 1830)     Medical Decision Making:   Initial Assessment:   31-year-old male presents emergency department complaining nausea, vomiting, diarrhea, abdominal pain  Differential Diagnosis:   Toxidrome, overdose, withdrawal, dehydration, electrolyte dyscrasias, BREANNA, rhabdo, diverticulitis, cholecystitis, pancreatitis, appendicitis, obstruction, constipation UTI, pyelonephritis, kidney stone, gastroenteritis  Independently Interpreted Test(s):   I have ordered and independently interpreted X-rays - see prior notes.  Clinical Tests:   Lab Tests: Reviewed       <> Summary of Lab: Benign  ED Management:  Patient given IV fluid, antiemetic, Toradol, clonidine, GI cocktail.  He is tolerating p.o. with stable vital signs.  Informed him of results as well as plan to discharge with prescription for Reglan, Bentyl, clonidine, instructed on home management, follow up with primary care physician, strict return precautions given.  Vital signs stable, patient comfortable with discharge at this time.                       Clinical Impression:   Final diagnoses:  [R10.9] Abdominal pain  [R10.84] Generalized abdominal pain (Primary)  [R11.2, R19.7] Nausea vomiting and diarrhea  [F11.23] Opiate withdrawal          ED Disposition Condition    Discharge Stable        ED Prescriptions     Medication Sig Dispense Start Date End Date Auth. Provider    metoclopramide HCl (REGLAN) 10 MG tablet Take 1 tablet (10 mg total) by mouth every 6 (six) hours  as needed (Nausea). 14 tablet 5/22/2022  Jonnathan Best MD    dicyclomine (BENTYL) 20 mg tablet Take 1 tablet (20 mg total) by mouth 3 (three) times daily as needed (for abdominal cramping). 14 tablet 5/22/2022 6/21/2022 oJnnathan Best MD    cloNIDine (CATAPRES) 0.1 MG tablet Take 1 tablet (0.1 mg total) by mouth every 8 (eight) hours as needed (Withdrawal Symptoms). 14 tablet 5/22/2022 5/22/2023 Jonnathan Best MD        Follow-up Information     Follow up With Specialties Details Why Contact Info Additional Information    [facility] Family Medicine Schedule an appointment as soon as possible for a visit   [address], [address]  [facility] [address] [phone] Please park in [address] or [address] and use [address]osta. Take Medical Office Bldg. elevators.           Jonnathan Best MD  05/22/22 3491

## 2023-11-09 ENCOUNTER — HOSPITAL ENCOUNTER (EMERGENCY)
Facility: HOSPITAL | Age: 33
Discharge: HOME | End: 2023-11-10
Attending: EMERGENCY MEDICINE
Payer: COMMERCIAL

## 2023-11-09 DIAGNOSIS — K62.89 PROCTITIS: ICD-10-CM

## 2023-11-09 DIAGNOSIS — Z87.19 HISTORY OF ULCERATIVE COLITIS: Primary | ICD-10-CM

## 2023-11-09 DIAGNOSIS — K92.1 HEMATOCHEZIA: ICD-10-CM

## 2023-11-09 DIAGNOSIS — K51.20 ULCERATIVE PROCTITIS WITHOUT COMPLICATION: ICD-10-CM

## 2023-11-09 DIAGNOSIS — K62.5 RECTAL BLEEDING: ICD-10-CM

## 2023-11-09 LAB
ALBUMIN SERPL BCP-MCNC: 3.9 G/DL (ref 3.5–5.2)
ALP SERPL-CCNC: 66 U/L (ref 55–135)
ALT SERPL W/O P-5'-P-CCNC: 9 U/L (ref 10–44)
ANION GAP SERPL CALC-SCNC: 14 MMOL/L (ref 8–16)
AST SERPL-CCNC: 13 U/L (ref 10–40)
BASOPHILS # BLD AUTO: 0.04 K/UL (ref 0–0.2)
BASOPHILS NFR BLD: 0.7 % (ref 0–1.9)
BILIRUB SERPL-MCNC: 0.2 MG/DL (ref 0.1–1)
BUN SERPL-MCNC: 25 MG/DL (ref 6–20)
CALCIUM SERPL-MCNC: 8.7 MG/DL (ref 8.7–10.5)
CHLORIDE SERPL-SCNC: 104 MMOL/L (ref 95–110)
CO2 SERPL-SCNC: 23 MMOL/L (ref 23–29)
CREAT SERPL-MCNC: 1.2 MG/DL (ref 0.5–1.4)
DIFFERENTIAL METHOD: ABNORMAL
EOSINOPHIL # BLD AUTO: 0.2 K/UL (ref 0–0.5)
EOSINOPHIL NFR BLD: 3.2 % (ref 0–8)
ERYTHROCYTE [DISTWIDTH] IN BLOOD BY AUTOMATED COUNT: 11.6 % (ref 11.5–14.5)
EST. GFR  (NO RACE VARIABLE): >60 ML/MIN/1.73 M^2
GLUCOSE SERPL-MCNC: 81 MG/DL (ref 70–110)
HCT VFR BLD AUTO: 35.3 % (ref 40–54)
HGB BLD-MCNC: 12.5 G/DL (ref 14–18)
IMM GRANULOCYTES # BLD AUTO: 0.04 K/UL (ref 0–0.04)
IMM GRANULOCYTES NFR BLD AUTO: 0.7 % (ref 0–0.5)
LYMPHOCYTES # BLD AUTO: 2.4 K/UL (ref 1–4.8)
LYMPHOCYTES NFR BLD: 41.2 % (ref 18–48)
MCH RBC QN AUTO: 30.8 PG (ref 27–31)
MCHC RBC AUTO-ENTMCNC: 35.4 G/DL (ref 32–36)
MCV RBC AUTO: 87 FL (ref 82–98)
MONOCYTES # BLD AUTO: 0.5 K/UL (ref 0.3–1)
MONOCYTES NFR BLD: 8.4 % (ref 4–15)
NEUTROPHILS # BLD AUTO: 2.7 K/UL (ref 1.8–7.7)
NEUTROPHILS NFR BLD: 46.5 % (ref 38–73)
NRBC BLD-RTO: 0 /100 WBC
PLATELET # BLD AUTO: 264 K/UL (ref 150–450)
PMV BLD AUTO: 10.2 FL (ref 9.2–12.9)
POTASSIUM SERPL-SCNC: 4.2 MMOL/L (ref 3.5–5.1)
PROT SERPL-MCNC: 6.7 G/DL (ref 6–8.4)
RBC # BLD AUTO: 4.06 M/UL (ref 4.6–6.2)
SODIUM SERPL-SCNC: 141 MMOL/L (ref 136–145)
WBC # BLD AUTO: 5.7 K/UL (ref 3.9–12.7)

## 2023-11-09 PROCEDURE — 80053 COMPREHEN METABOLIC PANEL: CPT | Performed by: EMERGENCY MEDICINE

## 2023-11-09 PROCEDURE — 85025 COMPLETE CBC W/AUTO DIFF WBC: CPT | Performed by: EMERGENCY MEDICINE

## 2023-11-09 PROCEDURE — 99285 EMERGENCY DEPT VISIT HI MDM: CPT

## 2023-11-10 VITALS
DIASTOLIC BLOOD PRESSURE: 63 MMHG | TEMPERATURE: 98 F | HEART RATE: 61 BPM | OXYGEN SATURATION: 100 % | BODY MASS INDEX: 21 KG/M2 | SYSTOLIC BLOOD PRESSURE: 108 MMHG | RESPIRATION RATE: 18 BRPM | HEIGHT: 71 IN | WEIGHT: 150 LBS

## 2023-11-10 LAB — OB PNL STL: NEGATIVE

## 2023-11-10 PROCEDURE — 25500020 PHARM REV CODE 255: Performed by: EMERGENCY MEDICINE

## 2023-11-10 PROCEDURE — 82272 OCCULT BLD FECES 1-3 TESTS: CPT | Performed by: EMERGENCY MEDICINE

## 2023-11-10 RX ORDER — HYDROCORTISONE ACETATE 25 MG/1
25 SUPPOSITORY RECTAL 2 TIMES DAILY
Qty: 20 SUPPOSITORY | Refills: 0 | Status: SHIPPED | OUTPATIENT
Start: 2023-11-10 | End: 2023-11-20

## 2023-11-10 RX ADMIN — IOHEXOL 100 ML: 350 INJECTION, SOLUTION INTRAVENOUS at 12:11

## 2023-11-10 RX ADMIN — IOHEXOL 30 ML: 350 INJECTION, SOLUTION INTRAVENOUS at 12:11

## 2023-11-10 NOTE — ED PROVIDER NOTES
Encounter Date: 11/9/2023       History     Chief Complaint   Patient presents with    Rectal Bleeding     Complaining of blood in stools for 3 months.  Bleeding had gotten worse.  Has an appt with GI in December.     Patient is a 33-year-old male with a past history of ulcerative colitis (UC)  who presents to the ED with complaint of rectal bleeding.  Patient reports intermittent bloody stools for the past 3 months.  He does have a history of ulcerative colitis in his scheduled to follow-up with GI in December.  He denies any overt abdominal pain,  nausea, vomiting, fevers unintentional weight loss,  joint pain, myalgia or skin changes but is concerned that his UC, which had  initially been remission for while, has returned.  He states that he has GI follow-up scheduled for December later this year.      Review of patient's allergies indicates:  No Known Allergies  Past Medical History:   Diagnosis Date    Asthma     Closed right radial fracture     Depression     H/O chest tube placement     Rib fracture     Ulcerative colitis      Past Surgical History:   Procedure Laterality Date    COLONOSCOPY N/A 3/8/2017    Procedure: COLONOSCOPY;  Surgeon: Max Cullen Jr., MD;  Location: [facility];  Service: Endoscopy;  Laterality: N/A;    COLONOSCOPY N/A 2/20/2019    Procedure: COLONOSCOPY;  Surgeon: Ramin Davis MD;  Location: [facility] ([address] [place] [address]);  Service: Endoscopy;  Laterality: N/A;  order seperated per Dr. Frausto    MOUTH SURGERY      OPEN REDUCTION AND INTERNAL FIXATION (ORIF) OF FRACTURE OF DISTAL RADIUS Right 9/4/2020    Procedure: ORIF, FRACTURE, RADIUS, DISTAL;  Surgeon: Misbah Leigh Jr., MD;  Location: [facility] OR;  Service: Orthopedics;  Laterality: Right;  ACUMED; mini c-arm  Confirmed 9/3 - [facility]    TONSILLECTOMY       Family History   Problem Relation Age of Onset    Celiac disease Neg Hx     Cirrhosis Neg Hx     Colon cancer Neg Hx     Colon polyps Neg Hx     Crohn's disease Neg Hx     Esophageal cancer  Neg Hx     Inflammatory bowel disease Neg Hx     Liver cancer Neg Hx     Liver disease Neg Hx     Rectal cancer Neg Hx     Stomach cancer Neg Hx     Ulcerative colitis Neg Hx      Social History     Tobacco Use    Smoking status: Former    Smokeless tobacco: Never    Tobacco comments:     quit 7 yrs ago   Substance Use Topics    Alcohol use: Yes     Comment: rarely    Drug use: No     Review of Systems   Constitutional:  Negative for chills and fever.   Respiratory:  Negative for cough, chest tightness and shortness of breath.    Gastrointestinal:  Positive for blood in stool. Negative for abdominal pain, anal bleeding, nausea and vomiting.        +rectal bleeding    Genitourinary:  Negative for dysuria.   Musculoskeletal:  Negative for back pain, gait problem and joint swelling.   Skin:  Negative for pallor and rash.   Neurological:  Negative for dizziness and headaches.   Psychiatric/Behavioral:  Negative for agitation and confusion.        Physical Exam     Initial Vitals [11/09/23 2210]   BP Pulse Resp Temp SpO2   115/74 69 18 98.1 °F (36.7 °C) 100 %      MAP       --         Physical Exam    Nursing note and vitals reviewed.  Constitutional: He appears well-developed and well-nourished.   Well-appearing   No acute distress  Non toxic in appearance    HENT:   Head: Normocephalic and atraumatic.   Right Ear: External ear normal.   Left Ear: External ear normal.   Eyes: EOM are normal. Pupils are equal, round, and reactive to light.   Neck: Neck supple.   Normal range of motion.  Cardiovascular:  Normal rate, regular rhythm, normal heart sounds and intact distal pulses.           Pulmonary/Chest: Breath sounds normal.   Abdominal: Abdomen is soft. Bowel sounds are normal.   Genitourinary:    Genitourinary Comments:  exam chaperoned with ED RN, Stephanie Astorga.    Rectal exam largely unremarkable for any external abnormalities; no external hemorrhoids or anal fissures; normal rectal tone without gross blood on  digital rectal exam; no masses or prostate enlargement appreciated on limited digital rectal exam; no active bleeding noted     Musculoskeletal:         General: Normal range of motion.      Cervical back: Normal range of motion and neck supple.     Neurological: He is alert and oriented to person, place, and time. He has normal strength. GCS score is 15. GCS eye subscore is 4. GCS verbal subscore is 5. GCS motor subscore is 6.   Skin: Skin is warm. Capillary refill takes less than 2 seconds.   Psychiatric: He has a normal mood and affect.         ED Course   Procedures  Labs Reviewed   CBC W/ AUTO DIFFERENTIAL - Abnormal; Notable for the following components:       Result Value    RBC 4.06 (*)     Hemoglobin 12.5 (*)     Hematocrit 35.3 (*)     Immature Granulocytes 0.7 (*)     All other components within normal limits   COMPREHENSIVE METABOLIC PANEL - Abnormal; Notable for the following components:    BUN 25 (*)     ALT 9 (*)     All other components within normal limits   OCCULT BLOOD X 1, STOOL          Imaging Results              CT Abdomen Pelvis With IV Contrast (Final result)  Result time 11/10/23 00:57:53      Final result by Neymar Varela MD (11/10/23 00:57:53)                   Impression:      Prominent vascularity in the distal rectum mucosa.  Correlate for internal hemorrhoids versus proctitis.    No evidence of active GI bleed.      Electronically signed by: Neymar Varela  Date:    11/10/2023  Time:    00:57               Narrative:    EXAMINATION:  CT ABDOMEN PELVIS WITH IV CONTRAST    CLINICAL HISTORY:  GI bleed;    TECHNIQUE:  Using 130 cc of  Omnipaque 350 IV, and multi-detector helical CT technique, axial CT angiogram images of the abdomen were obtained from the lung bases through the pelvis. Precontrast and portal venous phase images of the abdomen and pelvis also done. 2D post-processing coronal and sagittal reconstructions of the abdominal aorta and visceral arteries  performed.    COMPARISON:  None    FINDINGS:  The lung bases are well aerated.  No consolidation, suspicious nodules, or pleural effusion.  The visualized portions of the heart appear normal.    The esophagus, stomach, spleen, pancreas, and adrenal glands are unremarkable.    The liver is normal in size and attenuation without focal abnormality.  The portal veins appear patent.  The gallbladder shows no evidence of stones or cholecystitis.  No intra-or extrahepatic biliary ductal dilatation.    The kidneys demonstrate normal enhancement.  No renal mass, nephrolithiasis or hydronephrosis.  The ureters are normal in course and caliber. The urinary bladder appears unremarkable    No evidence of gastrointestinal hemorrhage, bowel obstruction, or inflammation.  The arterial phase imaging demonstrates prominent vessel enhancement in the wall of the rectum without extravasation or oozing on delayed imaging.  There is no ascites, free fluid, or intraperitoneal free air. No significant peritoneal or retroperitoneal adenopathy.    The abdominal aorta is normal in course and caliber without significant atherosclerotic calcifications.    The osseous structures and extraperitoneal soft tissues are unremarkable.                                       Medications   iohexoL (OMNIPAQUE 350) injection 100 mL (30 mLs Intravenous Given 11/10/23 0016)   iohexoL (OMNIPAQUE 350) injection 100 mL (100 mLs Intravenous Given 11/10/23 0015)     Medical Decision Making  Amount and/or Complexity of Data Reviewed  Labs: ordered.  Radiology: ordered. Decision-making details documented in ED Course.    Risk  Prescription drug management.               ED Course as of 11/10/23 0133   Fri Nov 10, 2023   0107 CT Abdomen Pelvis With IV Contrast     [LC]   0107 Prominent vascularity in the distal rectum mucosa.  Correlate for internal hemorrhoids versus proctitis.     No evidence of active GI bleed.   [LC]      ED Course User Index  [LC] Nasim  Roberto Carlos HURT MD               Medical Decision Making:   Initial Assessment:   See HPI   Differential Diagnosis:   Crohn's colitis, proctitis, ulcerative colitis, internal hemorrhoid, external hemorrhoid, anal fissure  Clinical Tests:   Lab Tests: Reviewed and Ordered  Radiological Study: Ordered and Reviewed  ED Management:  - CBC notable for stable H&H; no significant leukocytosis; CMP without significant electrolyte abnormalities; renal function within normal limits; LFTs within normal limits; CT abdomen pelvis with IV contrast demonstrates no active GI bleed but findings demonstrate prominent vascularity in the distal rectum mucosa; no active GI bleed noted per final radiology read  - will discharge home with prescription for Proctofoam; patient will follow-up with GI next month; this time he does not merit admission, advanced intervention such as blood products or other acute intervention for active GI bleeding; patient stable for discharge at this time.  He is in no acute distress and resting comfortably in the ED. patient is comfortable plan for discharge as indicated above and follow-up with gastroenterology as an outpatient.  Patient given very strict ED return precautions for any new or worsening symptoms he verbalized understanding of this; he expresses willingness to comply my recommendations  - No further intervention is indicated at this time after having taken into account the patient's history, physical exam findings, and empirical and objective data obtained during the patient's emergency department workup.   - The patient is at low risk for an emergent medical condition at this time, and I am of the belief that that it is safe to discharge the patient from the emergency department.   - The patient is instructed to follow up as outpatient as indicated on the discharge paperwork.    - I have discussed the specifics of the workup with the patient and the patient has verbalized understanding of the details  of the workup, the diagnosis, the treatment plan, and the need for outpatient follow-up.    - Although the patient has no emergent etiology today this does not preclude the development of an emergent condition so, in addition, I have advised the patient that they can return to the ED and/or activate EMS at any time with worsening of their symptoms, change of their symptoms, or with any other medical complaint.    - The patient remained comfortable and stable during their visit in the ED.    - Discharge and follow-up instructions discussed with the patient who expressed understanding and willingness to comply with my recommendations.  - Results of all emergency department tests  discussed thoroughly with patient; all patient questions answered; pt in agreement with plan  - Pt instructed to follow up with PCP in 2-3 days for recheck of today's complaints  - Pt given strict emergency department return precautions for any new or worsening of symptoms  - Pt discharged from the emergency department in stable condition, in no acute distress      Dictation #1  MRN:[id number]  CSN:[id number]     Clinical Impression:   Final diagnoses:  [Z87.19] History of ulcerative colitis (Primary)  [K62.89] Proctitis  [K62.5] Rectal bleeding        ED Disposition Condition    Discharge Stable          ED Prescriptions       Medication Sig Dispense Start Date End Date Auth. Provider    hydrocortisone (ANUSOL-HC) 25 mg suppository Place 1 suppository (25 mg total) rectally 2 (two) times daily. for 10 days 20 suppository 11/10/2023 11/20/2023 Roberto Carlos Rouse MD          Follow-up Information       Follow up With Specialties Details Why Contact Info    Alex Dixon MD Gastroenterology Schedule an appointment as soon as possible for a visit   [address] [place] [address] [address]  [facility] [id number] [phone]               Roberto Carlos Rouse MD  11/10/23 0131       Roberto Carlos Rouse MD  11/10/23 0133

## 2023-12-19 ENCOUNTER — TELEPHONE (OUTPATIENT)
Dept: GASTROENTEROLOGY | Facility: CLINIC | Age: 33
End: 2023-12-19
Payer: COMMERCIAL

## 2023-12-19 DIAGNOSIS — K51.211 ULCERATIVE PROCTITIS WITH RECTAL BLEEDING: Primary | ICD-10-CM

## 2023-12-19 RX ORDER — MESALAMINE 4 G/60ML
4 SUSPENSION RECTAL NIGHTLY
Qty: 60 ENEMA | Refills: 5 | Status: SHIPPED | OUTPATIENT
Start: 2023-12-19 | End: 2024-12-18

## 2023-12-19 NOTE — TELEPHONE ENCOUNTER
Ok to start rowasa enemas.   Sent to [facility] pharmacy.  He needs to see me in follow up , no overbook.  Can add to waitlist.            ----- Message -----  From: Alex Dixon MD  Sent: 12/19/2023  10:55 AM CST  To: Biju Pulliam MA    No patient is linked?    ----- Message -----  From: Biju Pulliam MA  Sent: 12/18/2023   4:20 PM CST  To: Alex Dixon MD    Patient  went to the ER back in Nov. for ulcerative colitis/ bleeding. The medication hydrocortisone is to expensive for the patient and he would like to get the enemas sent in. Please advise. Patient did have an appt. with Stephanie on 12/18 does he need to follow up?

## 2023-12-20 ENCOUNTER — TELEPHONE (OUTPATIENT)
Dept: GASTROENTEROLOGY | Facility: CLINIC | Age: 33
End: 2023-12-20
Payer: COMMERCIAL

## 2024-08-19 ENCOUNTER — HOSPITAL ENCOUNTER (EMERGENCY)
Facility: HOSPITAL | Age: 34
Discharge: HOME | End: 2024-08-19
Attending: EMERGENCY MEDICINE

## 2024-08-19 VITALS
DIASTOLIC BLOOD PRESSURE: 72 MMHG | SYSTOLIC BLOOD PRESSURE: 122 MMHG | HEIGHT: 71 IN | BODY MASS INDEX: 21 KG/M2 | OXYGEN SATURATION: 97 % | HEART RATE: 77 BPM | TEMPERATURE: 99 F | WEIGHT: 150 LBS | RESPIRATION RATE: 18 BRPM

## 2024-08-19 DIAGNOSIS — R53.83 FATIGUE, UNSPECIFIED TYPE: ICD-10-CM

## 2024-08-19 DIAGNOSIS — E86.0 DEHYDRATION: Primary | ICD-10-CM

## 2024-08-19 LAB
ALBUMIN SERPL BCP-MCNC: 4.4 G/DL (ref 3.5–5.2)
ALP SERPL-CCNC: 71 U/L (ref 55–135)
ALT SERPL W/O P-5'-P-CCNC: 9 U/L (ref 10–44)
ANION GAP SERPL CALC-SCNC: 10 MMOL/L (ref 8–16)
AST SERPL-CCNC: 14 U/L (ref 10–40)
BASOPHILS # BLD AUTO: 0.04 K/UL (ref 0–0.2)
BASOPHILS NFR BLD: 0.8 % (ref 0–1.9)
BILIRUB SERPL-MCNC: 0.3 MG/DL (ref 0.1–1)
BILIRUB UR QL STRIP: NEGATIVE
BUN SERPL-MCNC: 22 MG/DL (ref 6–20)
CALCIUM SERPL-MCNC: 9.4 MG/DL (ref 8.7–10.5)
CHLORIDE SERPL-SCNC: 102 MMOL/L (ref 95–110)
CK SERPL-CCNC: 63 U/L (ref 20–200)
CLARITY UR: CLEAR
CO2 SERPL-SCNC: 29 MMOL/L (ref 23–29)
COLOR UR: YELLOW
CREAT SERPL-MCNC: 1.3 MG/DL (ref 0.5–1.4)
DIFFERENTIAL METHOD BLD: ABNORMAL
EOSINOPHIL # BLD AUTO: 0.1 K/UL (ref 0–0.5)
EOSINOPHIL NFR BLD: 2.5 % (ref 0–8)
ERYTHROCYTE [DISTWIDTH] IN BLOOD BY AUTOMATED COUNT: 11.8 % (ref 11.5–14.5)
EST. GFR  (NO RACE VARIABLE): >60 ML/MIN/1.73 M^2
GLUCOSE SERPL-MCNC: 94 MG/DL (ref 70–110)
GLUCOSE UR QL STRIP: NEGATIVE
HCT VFR BLD AUTO: 38.6 % (ref 40–54)
HGB BLD-MCNC: 13.7 G/DL (ref 14–18)
HGB UR QL STRIP: NEGATIVE
IMM GRANULOCYTES # BLD AUTO: 0.02 K/UL (ref 0–0.04)
IMM GRANULOCYTES NFR BLD AUTO: 0.4 % (ref 0–0.5)
KETONES UR QL STRIP: NEGATIVE
LEUKOCYTE ESTERASE UR QL STRIP: NEGATIVE
LIPASE SERPL-CCNC: 18 U/L (ref 4–60)
LYMPHOCYTES # BLD AUTO: 2.4 K/UL (ref 1–4.8)
LYMPHOCYTES NFR BLD: 44.8 % (ref 18–48)
MCH RBC QN AUTO: 30.2 PG (ref 27–31)
MCHC RBC AUTO-ENTMCNC: 35.5 G/DL (ref 32–36)
MCV RBC AUTO: 85 FL (ref 82–98)
MONOCYTES # BLD AUTO: 0.4 K/UL (ref 0.3–1)
MONOCYTES NFR BLD: 8 % (ref 4–15)
NEUTROPHILS # BLD AUTO: 2.3 K/UL (ref 1.8–7.7)
NEUTROPHILS NFR BLD: 43.5 % (ref 38–73)
NITRITE UR QL STRIP: NEGATIVE
NRBC BLD-RTO: 0 /100 WBC
PH UR STRIP: 7 [PH] (ref 5–8)
PLATELET # BLD AUTO: 261 K/UL (ref 150–450)
PMV BLD AUTO: 10.4 FL (ref 9.2–12.9)
POTASSIUM SERPL-SCNC: 4.2 MMOL/L (ref 3.5–5.1)
PROT SERPL-MCNC: 7.1 G/DL (ref 6–8.4)
PROT UR QL STRIP: NEGATIVE
RBC # BLD AUTO: 4.53 M/UL (ref 4.6–6.2)
SARS-COV-2 RDRP RESP QL NAA+PROBE: NEGATIVE
SODIUM SERPL-SCNC: 141 MMOL/L (ref 136–145)
SP GR UR STRIP: 1.03 (ref 1–1.03)
URN SPEC COLLECT METH UR: NORMAL
UROBILINOGEN UR STRIP-ACNC: NEGATIVE EU/DL
WBC # BLD AUTO: 5.25 K/UL (ref 3.9–12.7)

## 2024-08-19 PROCEDURE — 96361 HYDRATE IV INFUSION ADD-ON: CPT

## 2024-08-19 PROCEDURE — 81003 URINALYSIS AUTO W/O SCOPE: CPT | Performed by: NURSE PRACTITIONER

## 2024-08-19 PROCEDURE — 99284 EMERGENCY DEPT VISIT MOD MDM: CPT | Mod: 25

## 2024-08-19 PROCEDURE — 83690 ASSAY OF LIPASE: CPT | Performed by: NURSE PRACTITIONER

## 2024-08-19 PROCEDURE — 96374 THER/PROPH/DIAG INJ IV PUSH: CPT

## 2024-08-19 PROCEDURE — 63600175 PHARM REV CODE 636 W HCPCS: Performed by: EMERGENCY MEDICINE

## 2024-08-19 PROCEDURE — 85025 COMPLETE CBC W/AUTO DIFF WBC: CPT | Performed by: NURSE PRACTITIONER

## 2024-08-19 PROCEDURE — 80053 COMPREHEN METABOLIC PANEL: CPT | Performed by: NURSE PRACTITIONER

## 2024-08-19 PROCEDURE — 82550 ASSAY OF CK (CPK): CPT | Performed by: NURSE PRACTITIONER

## 2024-08-19 PROCEDURE — U0002 COVID-19 LAB TEST NON-CDC: HCPCS | Performed by: NURSE PRACTITIONER

## 2024-08-19 PROCEDURE — 25000003 PHARM REV CODE 250: Performed by: NURSE PRACTITIONER

## 2024-08-19 RX ORDER — PROCHLORPERAZINE EDISYLATE 5 MG/ML
10 INJECTION INTRAMUSCULAR; INTRAVENOUS
Status: COMPLETED | OUTPATIENT
Start: 2024-08-19 | End: 2024-08-19

## 2024-08-19 RX ORDER — ONDANSETRON 4 MG/1
4 TABLET, ORALLY DISINTEGRATING ORAL EVERY 6 HOURS PRN
Qty: 10 TABLET | Refills: 0 | Status: SHIPPED | OUTPATIENT
Start: 2024-08-19

## 2024-08-19 RX ORDER — LACTULOSE 10 G/15ML
10 SOLUTION ORAL 2 TIMES DAILY PRN
Qty: 150 ML | Refills: 0 | Status: SHIPPED | OUTPATIENT
Start: 2024-08-19

## 2024-08-19 RX ORDER — DICYCLOMINE HYDROCHLORIDE 20 MG/1
20 TABLET ORAL 3 TIMES DAILY PRN
Qty: 14 TABLET | Refills: 0 | Status: SHIPPED | OUTPATIENT
Start: 2024-08-19 | End: 2024-09-18

## 2024-08-19 RX ADMIN — PROCHLORPERAZINE EDISYLATE 10 MG: 5 INJECTION INTRAMUSCULAR; INTRAVENOUS at 06:08

## 2024-08-19 RX ADMIN — SODIUM CHLORIDE 1000 ML: 9 INJECTION, SOLUTION INTRAVENOUS at 06:08

## 2024-08-19 NOTE — ED PROVIDER NOTES
Encounter Date: 8/19/2024       History     Chief Complaint   Patient presents with    Fatigue     C/o fatigue w/ ABD cramping, constipation, and Dark urine x3 days. Pt believes he is dehydrated, works outside with decreased fluid intake.       33-year-old male presents emergency department complaining of 2 or 3 days of fatigue, decreased urinary output.  States he has been having more concentrated urine.  Notes constipation.  States he has felt like this before when he was dehydrated.  States he works out worse.  Notes some occasional abdominal cramping but none at this time.  Denies any chest pain or shortness of breath, lightheadedness, dizziness.  Denies any fever.  No other symptoms reported at this time.      Review of patient's allergies indicates:  No Known Allergies  Past Medical History:   Diagnosis Date    Asthma     Closed right radial fracture     Depression     H/O chest tube placement     Rib fracture     Ulcerative colitis      Past Surgical History:   Procedure Laterality Date    COLONOSCOPY N/A 3/8/2017    Procedure: COLONOSCOPY;  Surgeon: Max Cullen Jr., MD;  Location: [facility] ENDO;  Service: Endoscopy;  Laterality: N/A;    COLONOSCOPY N/A 2/20/2019    Procedure: COLONOSCOPY;  Surgeon: Ramin Davis MD;  [facility]  Service: Endoscopy;  Laterality: N/A;  order seperated per Dr. Frausto    MOUTH SURGERY      OPEN REDUCTION AND INTERNAL FIXATION (ORIF) OF FRACTURE OF DISTAL RADIUS Right 9/4/2020    Procedure: ORIF, FRACTURE, RADIUS, DISTAL;  Surgeon: Misbah Leigh Jr., MD;  Location: [facility] OR;  Service: Orthopedics;  Laterality: Right;  ACUMED; mini c-arm  Confirmed 9/3 - [facility]    TONSILLECTOMY       Family History   Problem Relation Name Age of Onset    Celiac disease Neg Hx      Cirrhosis Neg Hx      Colon cancer Neg Hx      Colon polyps Neg Hx      Crohn's disease Neg Hx      Esophageal cancer Neg Hx      Inflammatory bowel disease Neg Hx      Liver cancer Neg Hx      Liver  disease Neg Hx      Rectal cancer Neg Hx      Stomach cancer Neg Hx      Ulcerative colitis Neg Hx       Social History     Tobacco Use    Smoking status: Former    Smokeless tobacco: Never    Tobacco comments:     quit 7 yrs ago   Substance Use Topics    Alcohol use: Yes     Comment: rarely    Drug use: No     Review of Systems   Constitutional:  Positive for fatigue. Negative for chills and fever.   HENT:  Negative for congestion.    Respiratory:  Negative for cough and shortness of breath.    Cardiovascular:  Negative for chest pain.   Gastrointestinal:  Positive for abdominal pain.   Musculoskeletal:  Negative for back pain.   Neurological:  Negative for light-headedness and headaches.       Physical Exam     Initial Vitals [08/19/24 1754]   BP Pulse Resp Temp SpO2   120/68 75 16 98.5 °F (36.9 °C) 97 %      MAP       --         Physical Exam    Nursing note and vitals reviewed.  Constitutional: He appears well-developed and well-nourished. No distress.   HENT:   Head: Normocephalic and atraumatic.   Eyes: Conjunctivae and EOM are normal. Pupils are equal, round, and reactive to light.   Neck: Neck supple. No tracheal deviation present.   Normal range of motion.  Cardiovascular:  Normal rate and intact distal pulses.           Pulmonary/Chest: No respiratory distress.   Abdominal: Abdomen is soft. He exhibits no distension. There is no abdominal tenderness.   Musculoskeletal:         General: No tenderness or edema. Normal range of motion.      Cervical back: Normal range of motion and neck supple.     Neurological: He is alert and oriented to person, place, and time. He has normal strength. No cranial nerve deficit. GCS score is 15. GCS eye subscore is 4. GCS verbal subscore is 5. GCS motor subscore is 6.   Skin: Skin is warm and dry.         ED Course   Procedures  Labs Reviewed   COMPREHENSIVE METABOLIC PANEL - Abnormal       Result Value    Sodium 141      Potassium 4.2      Chloride 102      CO2 29       Glucose 94      BUN 22 (*)     Creatinine 1.3      Calcium 9.4      Total Protein 7.1      Albumin 4.4      Total Bilirubin 0.3      Alkaline Phosphatase 71      AST 14      ALT 9 (*)     eGFR >60      Anion Gap 10     CBC W/ AUTO DIFFERENTIAL - Abnormal    WBC 5.25      RBC 4.53 (*)     Hemoglobin 13.7 (*)     Hematocrit 38.6 (*)     MCV 85      MCH 30.2      MCHC 35.5      RDW 11.8      Platelets 261      MPV 10.4      Immature Granulocytes 0.4      Gran # (ANC) 2.3      Immature Grans (Abs) 0.02      Lymph # 2.4      Mono # 0.4      Eos # 0.1      Baso # 0.04      nRBC 0      Gran % 43.5      Lymph % 44.8      Mono % 8.0      Eosinophil % 2.5      Basophil % 0.8      Differential Method Automated     LIPASE    Lipase 18     URINALYSIS, REFLEX TO URINE CULTURE    Specimen UA Urine, Clean Catch      Color, UA Yellow      Appearance, UA Clear      pH, UA 7.0      Specific Gravity, UA 1.030      Protein, UA Negative      Glucose, UA Negative      Ketones, UA Negative      Bilirubin (UA) Negative      Occult Blood UA Negative      Nitrite, UA Negative      Urobilinogen, UA Negative      Leukocytes, UA Negative      Narrative:     Specimen Source->Urine   SARS-COV-2 RNA AMPLIFICATION, QUAL    SARS-CoV-2 RNA, Amplification, Qual Negative     CK    CPK 63            Imaging Results    None          Medications   sodium chloride 0.9% bolus 1,000 mL 1,000 mL (1,000 mLs Intravenous New Bag 8/19/24 1836)   prochlorperazine injection Soln 10 mg (10 mg Intravenous Given 8/19/24 1840)     Medical Decision Making  33-year-old male presents emergency department complaining of fatigue, constipation, feeling dehydrated      Differential: Dehydration, electrolyte dyscrasias, BREANNA, renal failure      Patient given IV fluid and Compazine.  On re-evaluation patient reports improvement in symptoms.  Vital signs remained stable.  Informed him of results as well as plan to discharge with prescriptions for Zofran, Bentyl, lactulose,  instructed on home management, over-the-counter medications, follow up with primary care physician, strict return precautions given.  Vital signs stable.    Problems Addressed:  Dehydration: acute illness or injury  Fatigue, unspecified type: acute illness or injury    Amount and/or Complexity of Data Reviewed  External Data Reviewed: notes.     Details: Reviewed most recent urgent care note documenting baseline medications and past medical history  Labs: ordered.     Details: CBC without leukocytosis, normal H&H; CMP with normal renal and liver function tests, normal electrolytes; COVID negative    Risk  OTC drugs.  Prescription drug management.  Parenteral controlled substances.                                      Clinical Impression:  Final diagnoses:  [E86.0] Dehydration (Primary)  [R53.83] Fatigue, unspecified type          ED Disposition Condition    Discharge Stable          ED Prescriptions       Medication Sig Dispense Start Date End Date Auth. Provider    ondansetron (ZOFRAN-ODT) 4 MG TbDL Take 1 tablet (4 mg total) by mouth every 6 (six) hours as needed (Nausea). 10 tablet 8/19/2024 -- Jonnathan Best MD    dicyclomine (BENTYL) 20 mg tablet Take 1 tablet (20 mg total) by mouth 3 (three) times daily as needed (for abdominal cramping). 14 tablet 8/19/2024 9/18/2024 Jonnathan Best MD    lactulose (CHRONULAC) 20 gram/30 mL Soln Take 15 mLs (10 g total) by mouth 2 (two) times daily as needed (Constipation). 150 mL 8/19/2024 -- Jonnathan Best MD          Follow-up Information       Follow up With Specialties Details Why Contact Info Additional Information    [facility] Family Medicine Schedule an appointment as soon as possible for a visit  Or with your own primary care physician [address], [address]  [facility] [address] [phone] Please park in [address] or [address] and use [address]. Take Medical Office Bldg. elevators.             Jonnathan Best  MD RUSSELL  08/19/24 4844

## 2024-08-20 NOTE — DISCHARGE INSTRUCTIONS
Please make sure you are drinking plenty of fluids.  I recommend purchasing over-the-counter glycerin suppositories to use in conjunction with the prescribed medication as needed for constipation.  I recommend taking ibuprofen 600 mg every 6 hours with food and/or Tylenol 650 mg every 6 hours in addition to the prescribed medication as needed for pain.  Please arrange for a follow-up appointment with a primary care physician for further evaluation and management.  Please return with any new or worsening symptoms.

## 2025-06-23 DIAGNOSIS — K51.211 ULCERATIVE PROCTITIS WITH RECTAL BLEEDING: ICD-10-CM

## 2025-06-25 RX ORDER — MESALAMINE 4 G/60ML
4 SUSPENSION RECTAL NIGHTLY
Qty: 60 ENEMA | Refills: 5 | Status: SHIPPED | OUTPATIENT
Start: 2025-06-25 | End: 2026-06-25

## (undated) DEVICE — STOCKINET 4INX48

## (undated) DEVICE — CLOSURE SKIN STERI STRIP 1/2X4

## (undated) DEVICE — PAD CAST SPECIALIST STRL 3

## (undated) DEVICE — APPLICATOR CHLORAPREP ORN 26ML

## (undated) DEVICE — BLADE SURG #15 CARBON STEEL

## (undated) DEVICE — PAD CAST SPECIALIST STRL 4

## (undated) DEVICE — PAD PREP 50/CA

## (undated) DEVICE — ADHESIVE MASTISOL VIAL 48/BX

## (undated) DEVICE — PAD CAST 2 IN X 4YDS STERILE

## (undated) DEVICE — BIT DRILL QUICK RELEASE 2.8MM

## (undated) DEVICE — DRAPE STERI INSTRUMENT 1018

## (undated) DEVICE — SPLINT PLASTER F.S. 3INX15IN

## (undated) DEVICE — BLADE SCALP OPHTL BEVEL STR

## (undated) DEVICE — SEE MEDLINE ITEM 152522

## (undated) DEVICE — DRESSING XEROFORM FOIL PK 1X8

## (undated) DEVICE — DRILL QUICK RELEASE 2.8MM 5IN

## (undated) DEVICE — BIT DRILL QUICK RELEASE 2.0MM

## (undated) DEVICE — BIT DRILL SEMI FLUTED 1.7MM

## (undated) DEVICE — GUIDEWIRE ORTHO .054 X 6 IN
Type: IMPLANTABLE DEVICE | Site: HAND | Status: NON-FUNCTIONAL
Removed: 2020-09-04

## (undated) DEVICE — INSTRUMENT SUCTION FRAZIER 12F

## (undated) DEVICE — SUT 0 VICRYL / UR6 (J603)

## (undated) DEVICE — SEE MEDLINE ITEM 157116

## (undated) DEVICE — COVER OVERHEAD SURG LT BLUE

## (undated) DEVICE — ALCOHOL 70% ISOP W/GREEN 16OZ

## (undated) DEVICE — SEE MEDLINE ITEM 157173

## (undated) DEVICE — ELECTRODE REM PLYHSV RETURN 9

## (undated) DEVICE — BANDAGE ELASTIC 3X5 VELCRO ST

## (undated) DEVICE — BANDAGE ELASTIC 2X5 VELCRO ST

## (undated) DEVICE — GLOVE SURG BIOGEL LATEX SZ 7.5

## (undated) DEVICE — SEE L#120831

## (undated) DEVICE — DRAPE MINI C-ARM 54 X 64

## (undated) DEVICE — MANIFOLD 4 PORT

## (undated) DEVICE — SUT FIBERWIRE 2-0 18

## (undated) DEVICE — SEE MEDLINE ITEM 152622

## (undated) DEVICE — GAUZE SPONGE 4X4 12PLY

## (undated) DEVICE — SEE MEDLINE ITEM 157117

## (undated) DEVICE — PACK BASIC

## (undated) DEVICE — SEE MEDLINE ITEM 156955